# Patient Record
Sex: MALE | Race: BLACK OR AFRICAN AMERICAN | NOT HISPANIC OR LATINO
[De-identification: names, ages, dates, MRNs, and addresses within clinical notes are randomized per-mention and may not be internally consistent; named-entity substitution may affect disease eponyms.]

---

## 2018-01-12 ENCOUNTER — APPOINTMENT (OUTPATIENT)
Dept: VASCULAR SURGERY | Facility: CLINIC | Age: 83
End: 2018-01-12

## 2018-06-27 ENCOUNTER — EMERGENCY (EMERGENCY)
Facility: HOSPITAL | Age: 83
LOS: 1 days | Discharge: HOME | End: 2018-06-27
Attending: EMERGENCY MEDICINE | Admitting: EMERGENCY MEDICINE
Payer: MEDICARE

## 2018-06-27 VITALS
RESPIRATION RATE: 16 BRPM | OXYGEN SATURATION: 100 % | DIASTOLIC BLOOD PRESSURE: 94 MMHG | SYSTOLIC BLOOD PRESSURE: 171 MMHG | HEART RATE: 61 BPM | TEMPERATURE: 99 F

## 2018-06-27 VITALS
SYSTOLIC BLOOD PRESSURE: 180 MMHG | DIASTOLIC BLOOD PRESSURE: 96 MMHG | OXYGEN SATURATION: 99 % | HEART RATE: 63 BPM | RESPIRATION RATE: 18 BRPM

## 2018-06-27 DIAGNOSIS — Z95.5 PRESENCE OF CORONARY ANGIOPLASTY IMPLANT AND GRAFT: Chronic | ICD-10-CM

## 2018-06-27 DIAGNOSIS — I65.21 OCCLUSION AND STENOSIS OF RIGHT CAROTID ARTERY: Chronic | ICD-10-CM

## 2018-06-27 DIAGNOSIS — Z95.1 PRESENCE OF AORTOCORONARY BYPASS GRAFT: Chronic | ICD-10-CM

## 2018-06-27 LAB — TROPONIN T, HIGH SENSITIVITY: 14 NG/L — SIGNIFICANT CHANGE UP (ref ?–14)

## 2018-06-27 PROCEDURE — 71046 X-RAY EXAM CHEST 2 VIEWS: CPT | Mod: 26

## 2018-06-27 PROCEDURE — 99285 EMERGENCY DEPT VISIT HI MDM: CPT | Mod: GC

## 2018-06-27 NOTE — ED ADULT NURSE NOTE - OBJECTIVE STATEMENT
Received pt. in room 17 alert and oriented x 4, complaining of chest pain. Patient stated " I have chest pain that started couple days ago". No c/o dizzyness, no nausea or vomiting. Placed on cardiac monitor , sinus rhythn labs sent will continue to monitor.

## 2018-06-27 NOTE — ED PROVIDER NOTE - PMH
BPH (benign prostatic hyperplasia)    CAD (coronary artery disease)    Essential hypertension    HLD (hyperlipidemia)

## 2018-06-27 NOTE — ED PROVIDER NOTE - PROGRESS NOTE DETAILS
Pt refused to stay for further management, has decided to sign out AMA Stanislaw: The pt is clinically sober, A&Ox3, free from distracting injury.  Throughout our interactions in the ED today, the pt has demonstrated concrete thinking/reasoning, has maintained an orderly/reasonable conversation, appears to have intact insight/judgment/reason and therefore in our opinion has capacity to make decisions.  Given the pt’s presentation, we communicated our concern for ischemic heart disease.    The pt verbalized an understanding of our worries. We’ve told the patient that the ED evaluation is incomplete & many troublesome conditions haven’t  been r/o.  We have discussed the need for further ED w/u so we can get more information about the chest pain.  We have discussed the range of possible dx, potential testing & tx options.  We’ve made  numerous efforts to prevent the pt from leaving AMA.  Our discussions included the potential outcomes of leaving AMA, including worsening of their condition, becoming permanently disabled/in pain/critically ill, or death.  Despite these efforts, we were unable to convince the pt to stay.  The pt is refusing any  further care and is leaving against medical advice. We have attempted to offer tx/rx/guidance for any dangerous conditions which are most likely and/or dangerous.  We have answered all questions and have implored the pt to return ASAP to complete the w/u. Pt agreed to follow up with his cardiologist as an outpatient ASAP.

## 2018-06-27 NOTE — ED PROVIDER NOTE - MEDICAL DECISION MAKING DETAILS
83M presenting with chest pain. patient currently asymptomatic. arm tingling occurs intermittently since old neck injury. low suspicion for ACS or pna. plan for cbc, cmp, tropx2, ekg. will reassess.

## 2018-06-27 NOTE — ED PROVIDER NOTE - PHYSICAL EXAMINATION
General: well appearing male, no acute distress   HEENT: normocephalic, atraumatic   Respiratory: normal work of breathing, lungs clear to auscultation bilaterally   Cardiac: regular rate and rhythm   Abdomen: soft, non-tender   MSK: no pain or swelling in lower extremities   Skin: no rash or lacerations   Neuro: A&Ox3

## 2018-06-27 NOTE — ED ADULT TRIAGE NOTE - CHIEF COMPLAINT QUOTE
CP with numbness and tingling to left arm and hand for a few days, saw PMD and scheduled an ECHO     *****

## 2018-06-27 NOTE — ED PROVIDER NOTE - OBJECTIVE STATEMENT
83M, PMH of HTN, CAD presenting with chest pain. Patient reports pain started yesterday, is intermittent, worse with exertion, associated with tingling down left arm. No current chest pain, no shortness of breath, dizziness, abdominal pain, nausea, vomiting. Patient reports increased activity yesterday as he was preparing for his anniversary dinner today. Patient has stress test scheduled for this Friday. 83M, PMH of HTN, CAD presenting with chest pain. Patient reports pain started yesterday, is intermittent, worse with exertion, associated with tingling down left arm. No current chest pain, no shortness of breath, dizziness, abdominal pain, nausea, vomiting. Patient reports increased activity yesterday as he was preparing for his anniversary dinner today. Patient has stress test scheduled for this Friday.    Cardiologist: Sivakumar Brooks ([facility])

## 2018-06-27 NOTE — ED PROVIDER NOTE - ATTENDING CONTRIBUTION TO CARE
Stanislaw: 84 yo male with a h/o HTN, CAD c/o 2 days of chest pain radiating down his left arm that began yesterday. the pain is intermittent and worse with exertion. No associated SOB, abdominal pain, nausea, vomiting or diaphoresis. No associated new/worsening LE pain or edema. Pt has chronic left knee pain when walking. No cough, fevers or chills. Exam: GENERAL: well appearing, NAD, HEENT: MMM,  CARDIO: +S1/S2, no murmurs, rubs or gallops, LUNGS: CTA B/L, no wheezing, rales or rhonchi, ABD: soft, nontender, BSx4 quadrants, no guarding or rigidity. EXT: No LE edema or calf TTP, 2+ distal pulses x 4 extremities. NEURO: AxOx3, SKIN: no rashes or lesions, well perfused A/P- 84 yo male with a h/o CAD and chest pain. Pt's cardiologist recommended an outpatient stress test this Friday but pt had worse symptoms so he came to the ED. will obtain cbc, cmp, troponin, CXR, and discuss with pt cardiologist.

## 2018-09-21 ENCOUNTER — INPATIENT (INPATIENT)
Facility: HOSPITAL | Age: 83
LOS: 2 days | Discharge: HOME | End: 2018-09-24
Attending: INTERNAL MEDICINE | Admitting: INTERNAL MEDICINE
Payer: MEDICARE

## 2018-09-21 VITALS
RESPIRATION RATE: 18 BRPM | HEART RATE: 66 BPM | TEMPERATURE: 98 F | DIASTOLIC BLOOD PRESSURE: 107 MMHG | SYSTOLIC BLOOD PRESSURE: 152 MMHG | OXYGEN SATURATION: 96 %

## 2018-09-21 DIAGNOSIS — T73.3XXA EXHAUSTION DUE TO EXCESSIVE EXERTION, INITIAL ENCOUNTER: ICD-10-CM

## 2018-09-21 DIAGNOSIS — Z95.1 PRESENCE OF AORTOCORONARY BYPASS GRAFT: Chronic | ICD-10-CM

## 2018-09-21 DIAGNOSIS — I65.21 OCCLUSION AND STENOSIS OF RIGHT CAROTID ARTERY: Chronic | ICD-10-CM

## 2018-09-21 DIAGNOSIS — Z95.5 PRESENCE OF CORONARY ANGIOPLASTY IMPLANT AND GRAFT: Chronic | ICD-10-CM

## 2018-09-21 LAB
ALBUMIN SERPL ELPH-MCNC: 3.7 G/DL — SIGNIFICANT CHANGE UP (ref 3.3–5)
ALP SERPL-CCNC: 70 U/L — SIGNIFICANT CHANGE UP (ref 40–120)
ALT FLD-CCNC: 13 U/L — SIGNIFICANT CHANGE UP (ref 4–41)
APPEARANCE UR: CLEAR — SIGNIFICANT CHANGE UP
APTT BLD: 33.2 SEC — SIGNIFICANT CHANGE UP (ref 27.5–37.4)
AST SERPL-CCNC: 19 U/L — SIGNIFICANT CHANGE UP (ref 4–40)
BASOPHILS # BLD AUTO: 0.03 K/UL — SIGNIFICANT CHANGE UP (ref 0–0.2)
BASOPHILS NFR BLD AUTO: 0.4 % — SIGNIFICANT CHANGE UP (ref 0–2)
BILIRUB SERPL-MCNC: 0.3 MG/DL — SIGNIFICANT CHANGE UP (ref 0.2–1.2)
BILIRUB UR-MCNC: NEGATIVE — SIGNIFICANT CHANGE UP
BLOOD UR QL VISUAL: NEGATIVE — SIGNIFICANT CHANGE UP
BUN SERPL-MCNC: 16 MG/DL — SIGNIFICANT CHANGE UP (ref 7–23)
CALCIUM SERPL-MCNC: 9.5 MG/DL — SIGNIFICANT CHANGE UP (ref 8.4–10.5)
CHLORIDE SERPL-SCNC: 102 MMOL/L — SIGNIFICANT CHANGE UP (ref 98–107)
CO2 SERPL-SCNC: 22 MMOL/L — SIGNIFICANT CHANGE UP (ref 22–31)
COLOR SPEC: COLORLESS — SIGNIFICANT CHANGE UP
CREAT SERPL-MCNC: 1.01 MG/DL — SIGNIFICANT CHANGE UP (ref 0.5–1.3)
EOSINOPHIL # BLD AUTO: 0.14 K/UL — SIGNIFICANT CHANGE UP (ref 0–0.5)
EOSINOPHIL NFR BLD AUTO: 1.9 % — SIGNIFICANT CHANGE UP (ref 0–6)
GLUCOSE SERPL-MCNC: 78 MG/DL — SIGNIFICANT CHANGE UP (ref 70–99)
GLUCOSE UR-MCNC: NEGATIVE — SIGNIFICANT CHANGE UP
HCT VFR BLD CALC: 40.2 % — SIGNIFICANT CHANGE UP (ref 39–50)
HGB BLD-MCNC: 12 G/DL — LOW (ref 13–17)
IMM GRANULOCYTES # BLD AUTO: 0.02 # — SIGNIFICANT CHANGE UP
IMM GRANULOCYTES NFR BLD AUTO: 0.3 % — SIGNIFICANT CHANGE UP (ref 0–1.5)
INR BLD: 1.04 — SIGNIFICANT CHANGE UP (ref 0.88–1.17)
KETONES UR-MCNC: NEGATIVE — SIGNIFICANT CHANGE UP
LEUKOCYTE ESTERASE UR-ACNC: NEGATIVE — SIGNIFICANT CHANGE UP
LYMPHOCYTES # BLD AUTO: 1.7 K/UL — SIGNIFICANT CHANGE UP (ref 1–3.3)
LYMPHOCYTES # BLD AUTO: 22.5 % — SIGNIFICANT CHANGE UP (ref 13–44)
MCHC RBC-ENTMCNC: 26.8 PG — LOW (ref 27–34)
MCHC RBC-ENTMCNC: 29.9 % — LOW (ref 32–36)
MCV RBC AUTO: 89.7 FL — SIGNIFICANT CHANGE UP (ref 80–100)
MONOCYTES # BLD AUTO: 1.09 K/UL — HIGH (ref 0–0.9)
MONOCYTES NFR BLD AUTO: 14.4 % — HIGH (ref 2–14)
NEUTROPHILS # BLD AUTO: 4.58 K/UL — SIGNIFICANT CHANGE UP (ref 1.8–7.4)
NEUTROPHILS NFR BLD AUTO: 60.5 % — SIGNIFICANT CHANGE UP (ref 43–77)
NITRITE UR-MCNC: NEGATIVE — SIGNIFICANT CHANGE UP
NRBC # FLD: 0 — SIGNIFICANT CHANGE UP
PH UR: 6.5 — SIGNIFICANT CHANGE UP (ref 5–8)
PLATELET # BLD AUTO: 285 K/UL — SIGNIFICANT CHANGE UP (ref 150–400)
PMV BLD: 10 FL — SIGNIFICANT CHANGE UP (ref 7–13)
POTASSIUM SERPL-MCNC: 4.7 MMOL/L — SIGNIFICANT CHANGE UP (ref 3.5–5.3)
POTASSIUM SERPL-SCNC: 4.7 MMOL/L — SIGNIFICANT CHANGE UP (ref 3.5–5.3)
PROT SERPL-MCNC: 8.2 G/DL — SIGNIFICANT CHANGE UP (ref 6–8.3)
PROT UR-MCNC: NEGATIVE — SIGNIFICANT CHANGE UP
PROTHROM AB SERPL-ACNC: 11.6 SEC — SIGNIFICANT CHANGE UP (ref 9.8–13.1)
RBC # BLD: 4.48 M/UL — SIGNIFICANT CHANGE UP (ref 4.2–5.8)
RBC # FLD: 13.1 % — SIGNIFICANT CHANGE UP (ref 10.3–14.5)
SODIUM SERPL-SCNC: 138 MMOL/L — SIGNIFICANT CHANGE UP (ref 135–145)
SP GR SPEC: 1.01 — SIGNIFICANT CHANGE UP (ref 1–1.04)
TROPONIN T, HIGH SENSITIVITY: 15 NG/L — SIGNIFICANT CHANGE UP (ref ?–14)
UROBILINOGEN FLD QL: NORMAL — SIGNIFICANT CHANGE UP
WBC # BLD: 7.56 K/UL — SIGNIFICANT CHANGE UP (ref 3.8–10.5)
WBC # FLD AUTO: 7.56 K/UL — SIGNIFICANT CHANGE UP (ref 3.8–10.5)

## 2018-09-21 PROCEDURE — 99291 CRITICAL CARE FIRST HOUR: CPT

## 2018-09-21 PROCEDURE — 71045 X-RAY EXAM CHEST 1 VIEW: CPT | Mod: 26

## 2018-09-21 RX ORDER — DEXAMETHASONE 0.5 MG/5ML
10 ELIXIR ORAL ONCE
Qty: 0 | Refills: 0 | Status: COMPLETED | OUTPATIENT
Start: 2018-09-21 | End: 2018-09-21

## 2018-09-21 RX ORDER — METOPROLOL TARTRATE 50 MG
1 TABLET ORAL
Qty: 0 | Refills: 0 | COMMUNITY

## 2018-09-21 RX ORDER — DIPHENHYDRAMINE HCL 50 MG
50 CAPSULE ORAL EVERY 6 HOURS
Qty: 0 | Refills: 0 | Status: DISCONTINUED | OUTPATIENT
Start: 2018-09-21 | End: 2018-09-21

## 2018-09-21 RX ORDER — DEXAMETHASONE 0.5 MG/5ML
10 ELIXIR ORAL EVERY 6 HOURS
Qty: 0 | Refills: 0 | Status: DISCONTINUED | OUTPATIENT
Start: 2018-09-22 | End: 2018-09-23

## 2018-09-21 RX ORDER — DIPHENHYDRAMINE HCL 50 MG
50 CAPSULE ORAL EVERY 8 HOURS
Qty: 0 | Refills: 0 | Status: DISCONTINUED | OUTPATIENT
Start: 2018-09-21 | End: 2018-09-23

## 2018-09-21 RX ORDER — FAMOTIDINE 10 MG/ML
20 INJECTION INTRAVENOUS
Qty: 0 | Refills: 0 | Status: DISCONTINUED | OUTPATIENT
Start: 2018-09-21 | End: 2018-09-23

## 2018-09-21 RX ORDER — HEPARIN SODIUM 5000 [USP'U]/ML
5000 INJECTION INTRAVENOUS; SUBCUTANEOUS EVERY 8 HOURS
Qty: 0 | Refills: 0 | Status: DISCONTINUED | OUTPATIENT
Start: 2018-09-21 | End: 2018-09-24

## 2018-09-21 RX ORDER — DIPHENHYDRAMINE HCL 50 MG
50 CAPSULE ORAL ONCE
Qty: 0 | Refills: 0 | Status: COMPLETED | OUTPATIENT
Start: 2018-09-21 | End: 2018-09-21

## 2018-09-21 RX ORDER — FAMOTIDINE 10 MG/ML
20 INJECTION INTRAVENOUS ONCE
Qty: 0 | Refills: 0 | Status: COMPLETED | OUTPATIENT
Start: 2018-09-21 | End: 2018-09-21

## 2018-09-21 RX ORDER — INFLUENZA VIRUS VACCINE 15; 15; 15; 15 UG/.5ML; UG/.5ML; UG/.5ML; UG/.5ML
0.5 SUSPENSION INTRAMUSCULAR ONCE
Qty: 0 | Refills: 0 | Status: COMPLETED | OUTPATIENT
Start: 2018-09-21 | End: 2018-09-21

## 2018-09-21 RX ADMIN — Medication 102 MILLIGRAM(S): at 23:49

## 2018-09-21 RX ADMIN — Medication 125 MILLIGRAM(S): at 19:30

## 2018-09-21 RX ADMIN — Medication 50 MILLIGRAM(S): at 18:05

## 2018-09-21 RX ADMIN — Medication 102 MILLIGRAM(S): at 18:14

## 2018-09-21 RX ADMIN — HEPARIN SODIUM 5000 UNIT(S): 5000 INJECTION INTRAVENOUS; SUBCUTANEOUS at 22:36

## 2018-09-21 RX ADMIN — Medication 10 MILLIGRAM(S): at 18:44

## 2018-09-21 RX ADMIN — FAMOTIDINE 20 MILLIGRAM(S): 10 INJECTION INTRAVENOUS at 18:00

## 2018-09-21 NOTE — PATIENT PROFILE ADULT. - VISION (WITH CORRECTIVE LENSES IF THE PATIENT USUALLY WEARS THEM):
with glassess/Normal vision: sees adequately in most situations; can see medication labels, newsprint

## 2018-09-21 NOTE — ED PROVIDER NOTE - PHYSICAL EXAMINATION
Ivania Machado M.D.:   patient awake alert seen lying on stretcher in no respiratory distress .   LUNGS CTAB no wheeze no crackle.   CARD RRR no m/r/g.    Abdomen soft NT ND suprapubic fullness no rebound no guarding no CVA tenderness.   EXT WWP no edema no calf tenderness CV 2+DP/PT bilaterally.   neuro A&Ox3 gait normal.    skin warm and dry no rash  HEENT: moist mucous membranes, PERRL, EOMI significant upper lip swelling without any oral involvement.

## 2018-09-21 NOTE — ED ADULT NURSE NOTE - OBJECTIVE STATEMENT
Received pt into spot #3 accompanied by son. Pt A/o x3 calm cooperative, denies any pain or SOB at present. Pt c/o intermittent mid sternal stabbing chest pain lasting seconds at a time since this morning along with upper lip swelling and increased urinary frequency. Denies fever chills or dysuria. Denies any chest pain now. Denies any SOB, dizziness or nausea. Placed pt on tele monitor SR HR 60s. Angioedema noted to upper lip. Pt takes Ramipril and have been taking for years. Speech does not sound hoarse. Pt eval by resident and Dr. Holland. Decadron, pepcid and Benadryl given as ordered. ENT came to eval pt. Will continue to closely monitor.

## 2018-09-21 NOTE — ED PROVIDER NOTE - MEDICAL DECISION MAKING DETAILS
Skyler BEACH: 82 yo M with hyperlipidemia, HTN, CAD here for angioedema of upper lip. Seen by ENT and no lower airway involvement, thought to be type 1 angioedema. Patient observed in ED, rapid progression of upper lip swelling to lower lip and right face. No voice change or SOB. Patient evaluated by MICU and accepted for admission.

## 2018-09-21 NOTE — ED PROVIDER NOTE - PSH
S/P CABG (coronary artery bypass graft)    S/P drug eluting coronary stent placement    Stenosis of right carotid artery  s/p endartectomy

## 2018-09-21 NOTE — H&P ADULT - NSHPLABSRESULTS_GEN_ALL_CORE
Labs and imaging personally reviewed                            12.0   7.56  )-----------( 285      ( 21 Sep 2018 17:40 )             40.2     -    138  |  102  |  16  ----------------------------<  78  4.7   |  22  |  1.01    Ca    9.5      21 Sep 2018 17:40    TPro  8.2  /  Alb  3.7  /  TBili  0.3  /  DBili  x   /  AST  19  /  ALT  13  /  AlkPhos  70  -          LIVER FUNCTIONS - ( 21 Sep 2018 17:40 )  Alb: 3.7 g/dL / Pro: 8.2 g/dL / ALK PHOS: 70 u/L / ALT: 13 u/L / AST: 19 u/L / GGT: x           Urinalysis Basic - ( 21 Sep 2018 17:40 )    Color: COLORLESS / Appearance: CLEAR / S.007 / pH: 6.5  Gluc: NEGATIVE / Ketone: NEGATIVE  / Bili: NEGATIVE / Urobili: NORMAL   Blood: NEGATIVE / Protein: NEGATIVE / Nitrite: NEGATIVE   Leuk Esterase: NEGATIVE / RBC: x / WBC x   Sq Epi: x / Non Sq Epi: x / Bacteria: x      PT/INR - ( 21 Sep 2018 17:40 )   PT: 11.6 SEC;   INR: 1.04          PTT - ( 21 Sep 2018 17:40 )  PTT:33.2 SEC    EKG: NSR, bifascicular block, chronic    CXR: Clear lungs

## 2018-09-21 NOTE — H&P ADULT - PMH
BPH (benign prostatic hyperplasia)    CAD (coronary artery disease)    Essential hypertension    HLD (hyperlipidemia)
no

## 2018-09-21 NOTE — PROGRESS NOTE ADULT - SUBJECTIVE AND OBJECTIVE BOX
ENT FOLLOW UP NOTE    Pt seen and examined. Swelling mildly increased in ED but has stabilized, no further progression.   Now in MICU for close monitoring  Denies throat closing sensation, no trouble swallowing/voice changes    AVSS  NAD, asleep   Breathing comfortably on RA, no stridor/stertor  Upper lip swelling slightly worse, extension now to right corner of mouth and also mildly to the right lower lip as well  Patient speaking full sentences  Tongue without swelling, oral cavity and OP clear without swelling     A/P: type I angioedema, mild progression but now stabilized  -no change to previous recommendations  -discussed with MICU and order now changed to IV benadryl q8hr instead   -continue to monitor for improvement

## 2018-09-21 NOTE — ED ADULT NURSE NOTE - HOW OFTEN DO YOU HAVE A DRINK CONTAINING ALCOHOL?
Left message regarding below and to inform patient this form will be in dispensary for  today.   Never

## 2018-09-21 NOTE — ED PROVIDER NOTE - ATTENDING CONTRIBUTION TO CARE
Patient is a 84 yo M with history of CAD, HTN, hyperlipidemia, BPH here for upper lip swelling since 12 pm today. Patient is on Ramipril. No prior episodes. No tongue swelling, stridor. Denies sob, headache. Patient also c/o chest pain, sharp intermittent in his right and left chest. Denies any chest pain now. No abdominal pain. Reports discomfort when urinating. No burning.     VS noted  Gen. no acute distress, Non toxic   HEENT: EOMI, mmm,   Lungs: CTAB/L no C/ W /R   CVS: RRR   Abd; Soft non tender, non distended   Ext: no edema  Skin: no rash  Neuro AAOx3 non focal clear speech  a/p: angioedema of upper lip -  no voice change, sob, stridor. Will treat with steroids. ENT consulted. Will also work up patient's chest pain and urinary complaints.   - Skyler BEACH Patient is a 82 yo M with history of CAD, HTN, hyperlipidemia, BPH here for upper lip swelling since 12 pm today. Patient is on Ramipril. No prior episodes. No tongue swelling, stridor. Denies sob, headache. Patient also c/o chest pain, sharp intermittent in his right and left chest. Denies any chest pain now. No abdominal pain. Reports discomfort when urinating. No burning. Patient took 2 ramiprils today, at 10 AM and 12 PM (Because he thought it would help his swelling).     VS noted  Gen. no acute distress, Non toxic   HEENT: EOMI, mmm,   Lungs: CTAB/L no C/ W /R   CVS: RRR   Abd; Soft non tender, non distended   Ext: no edema  Skin: no rash  Neuro AAOx3 non focal clear speech  a/p: angioedema of upper lip -  no voice change, sob, stridor. Will treat with steroids. ENT consulted. Will also work up patient's chest pain and urinary complaints.   - Skyler BEACH

## 2018-09-21 NOTE — ED ADULT NURSE NOTE - ED STAT RN HANDOFF DETAILS
Report received from RN Juan Pablo Morales. Pt. received with angioedema noted. No respiratory distress at this time. Pt. is admitted to ICU, report given. Pt. will be transported with ED tech and RN at bedside. Report received from RN Juan Pablo Morales. Pt. received with angioedema noted, carter in place. No respiratory distress at this time. Pt. is admitted to ICU, report given. Pt. will be transported with ED tech and RN at bedside.

## 2018-09-21 NOTE — H&P ADULT - HISTORY OF PRESENT ILLNESS
83 M w/ BPH, CAD s/p PCI and CABG, HTN p/w lip swelling since yesterday. Pt reports that it initially started on his right upper lip but progressed to his entire upper lip and now progressing to his lower lip. Denies any new medications, detergents/lotions/cosmetic products, or new food exposure. Endorsed some chest pain earlier but reports now resolved. Denies any SOB, cough, or dysphagia. No fevers/chills, abd pain, nausea, vomiting, constipation, or diarrhea. Endorses urinary frequency but denies any dysuria or hematuria. Pt reports being on ramipril for CAD and took 2 doses today because he "did not feel well". He was seen by ENT in the ED w/ bedside scope revealing no airway compromise. MICU consulted due to progressively worsening angioedema and patient admitted to MICU for airway monitoring.    At baseline, pt functional, independent in ADLs, and a/ox3    Vital Signs Last 24 Hrs  T(C): 36.6 (21 Sep 2018 16:26), Max: 36.6 (21 Sep 2018 16:26)  T(F): 97.8 (21 Sep 2018 16:26), Max: 97.8 (21 Sep 2018 16:26)  HR: 74 (21 Sep 2018 19:36) (63 - 74)  BP: 166/122 (21 Sep 2018 19:36) (152/107 - 186/110)  RR: 16 (21 Sep 2018 19:36) (16 - 18)  SpO2: 100% (21 Sep 2018 19:36) (96% - 100%)

## 2018-09-21 NOTE — CONSULT NOTE ADULT - ASSESSMENT
A/P: type I angioedema 2/2 ACE inhibitor use  -ACE inhibitors now marked as allergy, patient instructed to follow up with PCP to change medications  -chest pain workup per ED  -IV decadron 10mg q6hr   -IV benadryl 50mg q8hr  -IV pepcid 20mg q12hrs  -monitor for symptomatic improvement, keep on continuous pulse ox  -keep NPO until lip swelling improves   -if patient develops respiratory distress or difficulty controlling secretions, page ENT and Anesthesia STAT  -will need to keep vs admit until lip swelling resolves  -no need for repeat FOE unless having acute respiratory symptoms - SOB, stridor, sensation of throat closing  -discussed with ED and d/w attending A/P: 82yo M with type I angioedema aka anterior isolated LIP edema only, most likely culprit is 2/2 ACE inhibitor use. FOE reveals no airway edema.   -ACE inhibitors now marked as allergy, patient instructed to follow up with PCP to change medications  -chest pain workup per ED  -IV decadron 10mg q6hr   -IV benadryl 50mg q8hr  -IV pepcid 20mg q12hrs  -monitor for symptomatic improvement, keep on continuous pulse ox  -keep NPO until lip swelling improves   -if patient develops respiratory distress or difficulty controlling secretions, page ENT and Anesthesia STAT  -will need to keep vs admit to medicine until lip swelling resolves  -strict glucose control   -no need for repeat FOE unless having acute respiratory symptoms - SOB, stridor, sensation of throat closing  -discussed with ED and d/w attending

## 2018-09-21 NOTE — H&P ADULT - NSHPPHYSICALEXAM_GEN_ALL_CORE
Vital Signs Last 24 Hrs  T(C): 36.6 (21 Sep 2018 16:26), Max: 36.6 (21 Sep 2018 16:26)  T(F): 97.8 (21 Sep 2018 16:26), Max: 97.8 (21 Sep 2018 16:26)  HR: 74 (21 Sep 2018 19:36) (63 - 74)  BP: 166/122 (21 Sep 2018 19:36) (152/107 - 186/110)  RR: 16 (21 Sep 2018 19:36) (16 - 18)  SpO2: 100% (21 Sep 2018 19:36) (96% - 100%)    GENERAL APPEARANCE: NAD, drowsy  HEENT:  NC/AT, clear conjunctiva, +angioedema of upper lip w/ involvement of R lower lip.   NECK: Neck supple, non-tender without lymphadenopathy, masses. No stridor  CARDIAC: Normal S1 and S2. No S3, S4 or murmurs. Rhythm is regular.  LUNGS: Clear to auscultation and percussion without rales, rhonchi, wheezing or diminished breath sounds.  ABDOMEN: Soft, nondistended, nontender. No guarding or rebound.   MUSKULOSKELETAL: No joint erythema or tenderness.   EXTREMITIES: No edema.  NEUROLOGICAL: No focal neurologic deficit.   SKIN: Skin clean, dry, intact  PSYCHIATRIC: AOx2.

## 2018-09-21 NOTE — ED ADULT TRIAGE NOTE - CHIEF COMPLAINT QUOTE
states" I have chest pain started since this morning and swelling to my lips since noon today". h/o stents, bypass, CAD, HTN

## 2018-09-21 NOTE — H&P ADULT - NSHPREVIEWOFSYSTEMS_GEN_ALL_CORE
CONSTITUTIONAL:  No fever, chills, weakness or fatigue.  HEENT:  No rhinorrhea, +lip swelling  SKIN:  No rash or itching.  CARDIOVASCULAR:  No chest pain, chest pressure or chest discomfort. No palpitations or edema.  RESPIRATORY:  No shortness of breath, cough or sputum.  GASTROINTESTINAL:  No nausea, vomiting or diarrhea. No abdominal pain.   GENITOURINARY:  Denies hematuria, dysuria.   NEUROLOGICAL:  No headache, dizziness, syncope.   MUSCULOSKELETAL:  No muscle, back pain, joint pain or stiffness.  HEMATOLOGIC:  No bleeding or bruising.

## 2018-09-21 NOTE — H&P ADULT - ATTENDING COMMENTS
84 yo man with angioedema involving face and upper lip.  Sparing the tongue and airway.  Received steroids and H1 H2 blockers, scoped by ENT, some progression of swelling while in ED so admitted to MICU for close monitoring for airway compromise/progression of angioedema which is likely 2/2 ACE inhibitor  - d/c ACE inhibitor  - continue decadron, famotidine and benedryl  - monitor for airway patency, O2 sat, progression of angioedema  - low threshold to intubate if there is any swelling of tongue/airway  - no indication for FFP at this time; progression seems to have slowed/stopped  guarded

## 2018-09-21 NOTE — ED PROVIDER NOTE - OBJECTIVE STATEMENT
Ivania Machado M.D: 83M hx BPH CAD, HTN HLD p/w lip swelling since 12pm. started on right side of lip and has since progressed to involve entire upper lip. nevr happened before. no difficulty speaking, swallowing, breathing. also notes intermittent left and right sided cp without associated symptoms. no known allergies. is on ramipril.

## 2018-09-21 NOTE — ED PROCEDURE NOTE - PROCEDURE ADDITIONAL DETAILS
Focused ED ultrasound of the bladder for volume measurement [id number]    Indication: suspected urinary retention  Findings:  Bladder volume: 397 ml  Impression: Urinary retention    Procedure note and images placed in patient's chart

## 2018-09-21 NOTE — H&P ADULT - ASSESSMENT
83 M w/ BPH, CAD s/p PCI and CABG, HTN a/w angioedema suspected 2/2 to ACEi. Pt admitted to MICU for airway monitoring.    #Neuro  -Mildly confused, possibly 2/2 to benadryl/steroids in elderly pt. Continue to monitor.     #Resp  -No respiratory distress, airway protected at this time, continue to monitor    #CVS  -Hemodynamically stable, continue to monitor  -Chest pain resolved. EKG w/ no changes, troponin negative  -Hold ramipril for now, consider ARB in the future    #GI  -NPO for now in the event patient needs to be intubated    #Renal  -Schmidt placed due to urinary retention in ER  -TOV when patient completed tx for angioedema as benadryl is anticholinergic    #Heme  -No issues, continue to monitor    #ID  -Afebrile, no leukocytosis. Monitor off abx    #Endo  -No issues, continue to monitor    #Ethics  -Full code

## 2018-09-21 NOTE — ED PROVIDER NOTE - PROGRESS NOTE DETAILS
Skyler BEACH: ENT at bedside. Skyler BEACH: Patient scoped by ENT, no airway involvement. This is type 1 angioedema per ENT. Recommendation is for decadron, benadryl, pepcid. No ICU admission indicated for type 1 angioedema. Ivania Machado M.D: angioedema continuing to progress now on lower lip. still no signs of repisratory distress. MICU called given progression. solumedrol ordered per ENT recs. will place emma as pt in retention. Skyler BEACH: Patient evaluated by Dr. Fritz of MICU and accepted for observation. Skyler BEACH: Patient evaluated by Dr. Fritz of MICU and accepted for MICU admssion and observation secondary to rapid progression of symptoms. No respiratory compromise in ED but given rapid progression, close monitoring warranted.

## 2018-09-21 NOTE — ED ADULT NURSE NOTE - NSIMPLEMENTINTERV_GEN_ALL_ED
Implemented All Fall Risk Interventions:  [place] to call system. Call bell, personal items and telephone within reach. Instruct patient to call for assistance. Room bathroom lighting operational. Non-slip footwear when patient is off stretcher. Physically safe environment: no spills, clutter or unnecessary equipment. Stretcher in lowest position, wheels locked, appropriate side rails in place. Provide visual cue, wrist band, yellow gown, etc. Monitor gait and stability. Monitor for mental status changes and reorient to person, place, and time. Review medications for side effects contributing to fall risk. Reinforce activity limits and safety measures with patient and family.

## 2018-09-21 NOTE — CONSULT NOTE ADULT - SUBJECTIVE AND OBJECTIVE BOX
Pt seen and examined in ED earlier at ~5:45PM.     HPI:  84yo M -Amercian with PMHx HTN on ramipril for many years presents with first episode of acute onset upper lip swelling which has progressed since noon today. States never has happened before personally, no family history. No respiratory issues - denies SOB, drooling, difficulty swallowing saliva, no sensation of throat closing. Besides upper lip, denies swelling elsewhere. No tongue, face, neck swelling. Able to take PO fine but now NPO. Denies voice change.   No hypotension, rash, dizziness, GI upset, or anaphylaxis symptoms.   No medications given in ambulance, in ED ordered for angioedema cocktail (decadron, pepcid, benadryl) but not yet given, awaiting arrival of meds.          Birth History:  PAST MEDICAL & SURGICAL HISTORY:  BPH (benign prostatic hyperplasia)  HLD (hyperlipidemia)  Essential hypertension  CAD (coronary artery disease)  S/P drug eluting coronary stent placement  Stenosis of right carotid artery: s/p endartectomy  S/P CABG (coronary artery bypass graft)    FAMILY HISTORY:      MEDICATIONS  (STANDING):  methylPREDNISolone sodium succinate Injectable 125 milliGRAM(s) IV Push Once    MEDICATIONS  (PRN):    Allergies  ramipril (Angioedema)    REVIEW OF SYSTEMS:  neg except per HPI                         12.0   7.56  )-----------( 285      ( 21 Sep 2018 17:40 )             40.2           Vital Signs Last 24 Hrs  T(C): 36.6 (21 Sep 2018 16:26), Max: 36.6 (21 Sep 2018 16:26)  T(F): 97.8 (21 Sep 2018 16:26), Max: 97.8 (21 Sep 2018 16:26)  HR: 63 (21 Sep 2018 18:15) (63 - 66)  BP: 186/110 (21 Sep 2018 18:15) (152/107 - 186/110)  BP(mean): --  RR: 16 (21 Sep 2018 18:15) (16 - 18)  SpO2: 99% (21 Sep 2018 18:15) (96% - 99%)      PHYSICAL EXAM:  Constitutional Normal: well nourished, well developed, no acute distress  Breathing comfortably on RA, no stridor or stertor  EOMI, face symmetric but with significantly swollen upper lip.   External Nose:  Normal, no structural deformities  Anterior Nasal Cavity:	Normal mucosa, no turbinate hypertrophy, straight septum				  Oral Cavity:  lips large, no trismus, no drooling. Good dentition, tongue midline and not enlarged, floor of mouth soft, uvula without edema, soft palate without edema.   Neck: No palpable lymphadenopathy, no neck swelling, trachea flat and neck is skinny   Neurologic: awake and alert      Fiberoptic Laryngoscopy  Adenoids mild but non-hypertrophied  nasopharynx and soft palate clear   epiglottis sharp, vocal cords mobile bilaterally with no lesions  arytenoids and AE folds are sharp   NO EVIDENCE OF AIRWAY EDEMA

## 2018-09-22 LAB
BLD GP AB SCN SERPL QL: NEGATIVE — SIGNIFICANT CHANGE UP
BUN SERPL-MCNC: 18 MG/DL — SIGNIFICANT CHANGE UP (ref 7–23)
C3 SERPL-MCNC: 141.2 MG/DL — SIGNIFICANT CHANGE UP (ref 90–180)
C4 SERPL-MCNC: 45.4 MG/DL — HIGH (ref 10–40)
CALCIUM SERPL-MCNC: 9.8 MG/DL — SIGNIFICANT CHANGE UP (ref 8.4–10.5)
CHLORIDE SERPL-SCNC: 101 MMOL/L — SIGNIFICANT CHANGE UP (ref 98–107)
CO2 SERPL-SCNC: 26 MMOL/L — SIGNIFICANT CHANGE UP (ref 22–31)
CREAT SERPL-MCNC: 1.03 MG/DL — SIGNIFICANT CHANGE UP (ref 0.5–1.3)
GLUCOSE SERPL-MCNC: 115 MG/DL — HIGH (ref 70–99)
HCT VFR BLD CALC: 47.1 % — SIGNIFICANT CHANGE UP (ref 39–50)
HGB BLD-MCNC: 14 G/DL — SIGNIFICANT CHANGE UP (ref 13–17)
MAGNESIUM SERPL-MCNC: 2.1 MG/DL — SIGNIFICANT CHANGE UP (ref 1.6–2.6)
MCHC RBC-ENTMCNC: 26.1 PG — LOW (ref 27–34)
MCHC RBC-ENTMCNC: 29.7 % — LOW (ref 32–36)
MCV RBC AUTO: 87.9 FL — SIGNIFICANT CHANGE UP (ref 80–100)
NRBC # FLD: 0 — SIGNIFICANT CHANGE UP
PHOSPHATE SERPL-MCNC: 3.8 MG/DL — SIGNIFICANT CHANGE UP (ref 2.5–4.5)
PLATELET # BLD AUTO: 346 K/UL — SIGNIFICANT CHANGE UP (ref 150–400)
PMV BLD: 10 FL — SIGNIFICANT CHANGE UP (ref 7–13)
POTASSIUM SERPL-MCNC: 4.2 MMOL/L — SIGNIFICANT CHANGE UP (ref 3.5–5.3)
POTASSIUM SERPL-SCNC: 4.2 MMOL/L — SIGNIFICANT CHANGE UP (ref 3.5–5.3)
RBC # BLD: 5.36 M/UL — SIGNIFICANT CHANGE UP (ref 4.2–5.8)
RBC # FLD: 13.2 % — SIGNIFICANT CHANGE UP (ref 10.3–14.5)
RH IG SCN BLD-IMP: POSITIVE — SIGNIFICANT CHANGE UP
SODIUM SERPL-SCNC: 140 MMOL/L — SIGNIFICANT CHANGE UP (ref 135–145)
WBC # BLD: 7.33 K/UL — SIGNIFICANT CHANGE UP (ref 3.8–10.5)
WBC # FLD AUTO: 7.33 K/UL — SIGNIFICANT CHANGE UP (ref 3.8–10.5)

## 2018-09-22 PROCEDURE — 99291 CRITICAL CARE FIRST HOUR: CPT

## 2018-09-22 RX ORDER — SODIUM CHLORIDE 9 MG/ML
1000 INJECTION, SOLUTION INTRAVENOUS
Qty: 0 | Refills: 0 | Status: DISCONTINUED | OUTPATIENT
Start: 2018-09-22 | End: 2018-09-23

## 2018-09-22 RX ADMIN — Medication 50 MILLIGRAM(S): at 10:30

## 2018-09-22 RX ADMIN — Medication 50 MILLIGRAM(S): at 01:37

## 2018-09-22 RX ADMIN — HEPARIN SODIUM 5000 UNIT(S): 5000 INJECTION INTRAVENOUS; SUBCUTANEOUS at 13:20

## 2018-09-22 RX ADMIN — SODIUM CHLORIDE 75 MILLILITER(S): 9 INJECTION, SOLUTION INTRAVENOUS at 20:25

## 2018-09-22 RX ADMIN — FAMOTIDINE 20 MILLIGRAM(S): 10 INJECTION INTRAVENOUS at 17:45

## 2018-09-22 RX ADMIN — HEPARIN SODIUM 5000 UNIT(S): 5000 INJECTION INTRAVENOUS; SUBCUTANEOUS at 22:40

## 2018-09-22 RX ADMIN — HEPARIN SODIUM 5000 UNIT(S): 5000 INJECTION INTRAVENOUS; SUBCUTANEOUS at 06:17

## 2018-09-22 RX ADMIN — Medication 102 MILLIGRAM(S): at 17:45

## 2018-09-22 RX ADMIN — Medication 102 MILLIGRAM(S): at 13:19

## 2018-09-22 RX ADMIN — FAMOTIDINE 20 MILLIGRAM(S): 10 INJECTION INTRAVENOUS at 06:16

## 2018-09-22 RX ADMIN — Medication 102 MILLIGRAM(S): at 06:52

## 2018-09-22 RX ADMIN — Medication 50 MILLIGRAM(S): at 17:45

## 2018-09-22 NOTE — PROGRESS NOTE ADULT - ATTENDING COMMENTS
83 M w/ BPH, CAD s/p PCI and CABG, HTN a/w angioedema suspected 2/2 to ACEi. Pt admitted to MICU for airway monitoring. Upper lip swelling worse this morning compared to last night while on corticosteroids, pepcid, benadryl. FFP ordered for refractory angioedema.

## 2018-09-22 NOTE — PROGRESS NOTE ADULT - ASSESSMENT
83 M w/ BPH, CAD s/p PCI and CABG, HTN a/w angioedema suspected 2/2 to ACEi. Pt admitted to MICU for airway monitoring.    #Neuro  -AxO to self, place and time this AM. moving all extremities. low concern    #Resp  -No respiratory distress, airway protected at this time, continue to monitor  -if patient develops respiratory distress or difficulty controlling secretions, page ENT and Anesthesia STAT, can intubate orally given no tongue, oral cavity, or OP involvement  -ENT found no swelling within oropharynx or trachea, all swelling is anterior  --monitor for symptomatic improvement, keep on continuous pulse ox  --no need for repeat FOE as per ENT    #CVS  -Hemodynamically stable, continue to monitor  -Chest pain resolved. EKG w/ no changes, troponin negative  -Hold ramipril for now, consider ARB in the future    #GI  -NPO until swelling improves as per ENT  -Will give D5 maintenance in context of not eating    #Renal  -Schmidt placed due to urinary retention in ER  -TOV when patient completed tx for angioedema as benadryl is anticholinergic    #Heme  -No issues, continue to monitor  -T&S and then one unit of FFP as this is possibly bradykinin mediated  -will check C1 esterase inhibitor     #ID  -Afebrile, no leukocytosis. Monitor off abx    #Endo  -No issues, continue to monitor    #DVT prophylaxis  -SubQ heparin    #Ethics  -Full code

## 2018-09-22 NOTE — PROGRESS NOTE ADULT - SUBJECTIVE AND OBJECTIVE BOX
Patient seen and examined at bedside.    Feels like the swelling has improved. Continues to deny respiratory symptoms, throat closing sensation, trouble swallowing or voice changes.    AVSS  NAD, awake and alert this AM   Breathing comfortably on RA, no stridor/stertor  Remaining upper and lower lip swelling however slightly improved from prior  OC/OP: tongue, FOM, soft palate, uvula wnl, posterior OP clear    A/P  83M admitted for management of type I angioedema, initially with mild progression but now improved.  -IV decadron 10mg q6hr until symptoms have resolved  -IV benadryl 50mg q8hr until symptoms have resolved  -IV pepcid 20mg q12hrs until 1 week after resolution of symptoms  -may advance diet as tolerated  -monitor for improvement, keep on continuous pulse ox  -if patient develops respiratory distress or difficulty controlling secretions, page ENT and anesthesia STAT, can intubate orally given no tongue, oral cavity, or OP involvement  -MICU care   -no need for repeat FOE unless having acute respiratory symptoms - SOB, stridor, sensation of throat closing

## 2018-09-22 NOTE — PROGRESS NOTE ADULT - SUBJECTIVE AND OBJECTIVE BOX
Pt seen and examined this AM in MICU.   Woke up easily, continues to deny respiratory symptoms, throat closing sensation, trouble swallowing/voice changes  He feels swelling has improved     AVSS  NAD, awake and alert this AM   Breathing comfortably on RA, no stridor/stertor  Upper lip swelling slightly improved, however swelling now involves the lower lip and some over the bilateral lower cheeks/chin area.   Patient speaking full sentences with no drooling, no issues controlling secretion  Tongue without swelling, oral cavity and OP clear without swelling, uvula midline and no involvement.     A/P: type I angioedema, mild progression but now stabilized  Continue angioedema cocktail per protocol:   -IV decadron 10mg q6hr   -IV benadryl 50mg q8hr  -IV pepcid 20mg q12hrs  -consider giving FFP as this is likely bradykinin mediated   -monitor for symptomatic improvement, keep on continuous pulse ox  -keep NPO until swelling improves   -if patient develops respiratory distress or difficulty controlling secretions, page ENT and Anesthesia STAT, can intubate orally given no tongue, oral cavity, or OP involvement  -MICU care   -strict glucose control   -no need for repeat FOE unless having acute respiratory symptoms - SOB, stridor, sensation of throat closing  -discussed with chief resident this AM

## 2018-09-22 NOTE — PROGRESS NOTE ADULT - SUBJECTIVE AND OBJECTIVE BOX
Ethan Riley, PGY1  MICU Team  [phone]    SUBJECTIVE: Patient seen and examined at bedside.     Interval Events:    OBJECTIVE:    VITAL SIGNS:  ICU Vital Signs Last 24 Hrs  T(C): 35.7 (22 Sep 2018 08:00), Max: 36.6 (21 Sep 2018 16:26)  T(F): 96.3 (22 Sep 2018 08:00), Max: 97.8 (21 Sep 2018 16:26)  HR: 73 (22 Sep 2018 08:00) (63 - 74)  BP: 123/91 (22 Sep 2018 08:00) (113/86 - 186/110)  BP(mean): 97 (22 Sep 2018 08:00) (89 - 133)  ABP: --  ABP(mean): --  RR: 14 (22 Sep 2018 08:00) (14 - 20)  SpO2: 96% (22 Sep 2018 08:00) (96% - 100%)         @ 07:  -   @ 07:00  --------------------------------------------------------  IN: 100 mL / OUT: 1500 mL / NET: -1400 mL     @ 07:  -   @ 12:01  --------------------------------------------------------  IN: 0 mL / OUT: 225 mL / NET: -225 mL      CAPILLARY BLOOD GLUCOSE          PHYSICAL EXAM:    General:   HEENT:   Neck:  Respiratory:   Cardiovascular:   Abdomen:   Extremities:   Skin:   Neurological:    MEDICATIONS:  MEDICATIONS  (STANDING):  dexamethasone  IVPB 10 milliGRAM(s) IV Intermittent every 6 hours  dextrose 5% + sodium chloride 0.45%. 1000 milliLiter(s) (75 mL/Hr) IV Continuous <Continuous>  diphenhydrAMINE   Injectable 50 milliGRAM(s) IV Push every 8 hours  famotidine Injectable 20 milliGRAM(s) IV Push two times a day  heparin  Injectable 5000 Unit(s) SubCutaneous every 8 hours  influenza   Vaccine 0.5 milliLiter(s) IntraMuscular once    MEDICATIONS  (PRN):      ALLERGIES:  Allergies    ramipril (Angioedema)    Intolerances        LABS:                        14.0   7.33  )-----------( 346      ( 22 Sep 2018 07:27 )             47.1     CBC Full  -  ( 22 Sep 2018 07:27 )  WBC Count : 7.33 K/uL  Hemoglobin : 14.0 g/dL  Hematocrit : 47.1 %  Platelet Count - Automated : 346 K/uL  Mean Cell Volume : 87.9 fL  Mean Cell Hemoglobin : 26.1 pg  Mean Cell Hemoglobin Concentration : 29.7 %  Auto Neutrophil # : x  Auto Lymphocyte # : x  Auto Monocyte # : x  Auto Eosinophil # : x  Auto Basophil # : x  Auto Neutrophil % : x  Auto Lymphocyte % : x  Auto Monocyte % : x  Auto Eosinophil % : x  Auto Basophil % : x        140  |  101  |  18  ----------------------------<  115<H>  4.2   |  26  |  1.03    Ca    9.8      22 Sep 2018 07:27  Phos  3.8       Mg     2.1         TPro  8.2  /  Alb  3.7  /  TBili  0.3  /  DBili  x   /  AST  19  /  ALT  13  /  AlkPhos  70      Creatinine Trend: 1.03<--, 1.01<--  LIVER FUNCTIONS - ( 21 Sep 2018 17:40 )  Alb: 3.7 g/dL / Pro: 8.2 g/dL / ALK PHOS: 70 u/L / ALT: 13 u/L / AST: 19 u/L / GGT: x           PT/INR - ( 21 Sep 2018 17:40 )   PT: 11.6 SEC;   INR: 1.04          PTT - ( 21 Sep 2018 17:40 )  PTT:33.2 SEC        Urinalysis Basic - ( 21 Sep 2018 17:40 )    Color: COLORLESS / Appearance: CLEAR / S.007 / pH: 6.5  Gluc: NEGATIVE / Ketone: NEGATIVE  / Bili: NEGATIVE / Urobili: NORMAL   Blood: NEGATIVE / Protein: NEGATIVE / Nitrite: NEGATIVE   Leuk Esterase: NEGATIVE / RBC: x / WBC x   Sq Epi: x / Non Sq Epi: x / Bacteria: x        EKG:   MICROBIOLOGY:    IMAGING:    RADIOLOGY & ADDITIONAL TESTS: Reviewed. Ethan Riley, PGY1  MICU Team  [phone]    SUBJECTIVE: Patient seen and examined at bedside. pt states his lip is more swollen, continues to have no SOB/difficulties swallowing.     Interval Events: Seen by ENT and given further recommendations     OBJECTIVE:    VITAL SIGNS:  ICU Vital Signs Last 24 Hrs  T(C): 35.7 (22 Sep 2018 08:00), Max: 36.6 (21 Sep 2018 16:26)  T(F): 96.3 (22 Sep 2018 08:00), Max: 97.8 (21 Sep 2018 16:26)  HR: 73 (22 Sep 2018 08:00) (63 - 74)  BP: 123/91 (22 Sep 2018 08:00) (113/86 - 186/110)  BP(mean): 97 (22 Sep 2018 08:00) (89 - 133)  ABP: --  ABP(mean): --  RR: 14 (22 Sep 2018 08:00) (14 - 20)  SpO2: 96% (22 Sep 2018 08:00) (96% - 100%)         @ 07:  -   @ 07:00  --------------------------------------------------------  IN: 100 mL / OUT: 1500 mL / NET: -1400 mL     @ 07:01  -   @ 12:01  --------------------------------------------------------  IN: 0 mL / OUT: 225 mL / NET: -225 mL      CAPILLARY BLOOD GLUCOSE          Physical Exam: Vital Signs Last 24 Hrs   GENERAL APPEARANCE: NAD, drowsy  HEENT:  NC/AT, clear conjunctiva, angioedema of upper and lower lips, no swelling apprieciated within the oropharnyx  NECK: Neck supple, non-tender without lymphadenopathy, masses. No stridor  CARDIAC: Normal S1 and S2. No S3, S4 or murmurs. Rhythm is regular.  LUNGS: Clear to auscultation and percussion without rales, rhonchi, wheezing or diminished breath sounds. no upper airway sounds.  ABDOMEN: Soft, nondistended, nontender. No guarding or rebound.   MUSKULOSKELETAL: No joint erythema or tenderness.   EXTREMITIES: No edema.  NEUROLOGICAL: No focal neurologic deficit. AO to self, place and time.  SKIN: Skin clean, dry, intact	    MEDICATIONS:  MEDICATIONS  (STANDING):  dexamethasone  IVPB 10 milliGRAM(s) IV Intermittent every 6 hours  dextrose 5% + sodium chloride 0.45%. 1000 milliLiter(s) (75 mL/Hr) IV Continuous <Continuous>  diphenhydrAMINE   Injectable 50 milliGRAM(s) IV Push every 8 hours  famotidine Injectable 20 milliGRAM(s) IV Push two times a day  heparin  Injectable 5000 Unit(s) SubCutaneous every 8 hours  influenza   Vaccine 0.5 milliLiter(s) IntraMuscular once    MEDICATIONS  (PRN):      ALLERGIES:  Allergies    ramipril (Angioedema)    Intolerances        LABS:                        14.0   7.33  )-----------( 346      ( 22 Sep 2018 07:27 )             47.1     CBC Full  -  ( 22 Sep 2018 07:27 )  WBC Count : 7.33 K/uL  Hemoglobin : 14.0 g/dL  Hematocrit : 47.1 %  Platelet Count - Automated : 346 K/uL  Mean Cell Volume : 87.9 fL  Mean Cell Hemoglobin : 26.1 pg  Mean Cell Hemoglobin Concentration : 29.7 %  Auto Neutrophil # : x  Auto Lymphocyte # : x  Auto Monocyte # : x  Auto Eosinophil # : x  Auto Basophil # : x  Auto Neutrophil % : x  Auto Lymphocyte % : x  Auto Monocyte % : x  Auto Eosinophil % : x  Auto Basophil % : x        140  |  101  |  18  ----------------------------<  115<H>  4.2   |  26  |  1.03    Ca    9.8      22 Sep 2018 07:27  Phos  3.8       Mg     2.1         TPro  8.2  /  Alb  3.7  /  TBili  0.3  /  DBili  x   /  AST  19  /  ALT  13  /  AlkPhos  70      Creatinine Trend: 1.03<--, 1.01<--  LIVER FUNCTIONS - ( 21 Sep 2018 17:40 )  Alb: 3.7 g/dL / Pro: 8.2 g/dL / ALK PHOS: 70 u/L / ALT: 13 u/L / AST: 19 u/L / GGT: x           PT/INR - ( 21 Sep 2018 17:40 )   PT: 11.6 SEC;   INR: 1.04          PTT - ( 21 Sep 2018 17:40 )  PTT:33.2 SEC        Urinalysis Basic - ( 21 Sep 2018 17:40 )    Color: COLORLESS / Appearance: CLEAR / S.007 / pH: 6.5  Gluc: NEGATIVE / Ketone: NEGATIVE  / Bili: NEGATIVE / Urobili: NORMAL   Blood: NEGATIVE / Protein: NEGATIVE / Nitrite: NEGATIVE   Leuk Esterase: NEGATIVE / RBC: x / WBC x   Sq Epi: x / Non Sq Epi: x / Bacteria: x        EKG:   MICROBIOLOGY:    IMAGING:    RADIOLOGY & ADDITIONAL TESTS: Reviewed.

## 2018-09-23 DIAGNOSIS — R63.8 OTHER SYMPTOMS AND SIGNS CONCERNING FOOD AND FLUID INTAKE: ICD-10-CM

## 2018-09-23 DIAGNOSIS — I10 ESSENTIAL (PRIMARY) HYPERTENSION: ICD-10-CM

## 2018-09-23 DIAGNOSIS — I25.10 ATHEROSCLEROTIC HEART DISEASE OF NATIVE CORONARY ARTERY WITHOUT ANGINA PECTORIS: ICD-10-CM

## 2018-09-23 DIAGNOSIS — N40.0 BENIGN PROSTATIC HYPERPLASIA WITHOUT LOWER URINARY TRACT SYMPTOMS: ICD-10-CM

## 2018-09-23 DIAGNOSIS — E78.5 HYPERLIPIDEMIA, UNSPECIFIED: ICD-10-CM

## 2018-09-23 DIAGNOSIS — Z29.9 ENCOUNTER FOR PROPHYLACTIC MEASURES, UNSPECIFIED: ICD-10-CM

## 2018-09-23 DIAGNOSIS — T78.3XXA ANGIONEUROTIC EDEMA, INITIAL ENCOUNTER: ICD-10-CM

## 2018-09-23 LAB
ALBUMIN SERPL ELPH-MCNC: 4.1 G/DL — SIGNIFICANT CHANGE UP (ref 3.3–5)
ALP SERPL-CCNC: 78 U/L — SIGNIFICANT CHANGE UP (ref 40–120)
ALT FLD-CCNC: 10 U/L — SIGNIFICANT CHANGE UP (ref 4–41)
APPEARANCE UR: CLEAR — SIGNIFICANT CHANGE UP
AST SERPL-CCNC: 21 U/L — SIGNIFICANT CHANGE UP (ref 4–40)
BACTERIA # UR AUTO: NEGATIVE — SIGNIFICANT CHANGE UP
BASOPHILS # BLD AUTO: 0.03 K/UL — SIGNIFICANT CHANGE UP (ref 0–0.2)
BASOPHILS NFR BLD AUTO: 0.1 % — SIGNIFICANT CHANGE UP (ref 0–2)
BILIRUB SERPL-MCNC: 0.3 MG/DL — SIGNIFICANT CHANGE UP (ref 0.2–1.2)
BILIRUB UR-MCNC: NEGATIVE — SIGNIFICANT CHANGE UP
BLOOD UR QL VISUAL: HIGH
BUN SERPL-MCNC: 21 MG/DL — SIGNIFICANT CHANGE UP (ref 7–23)
CALCIUM SERPL-MCNC: 9.7 MG/DL — SIGNIFICANT CHANGE UP (ref 8.4–10.5)
CHLORIDE SERPL-SCNC: 98 MMOL/L — SIGNIFICANT CHANGE UP (ref 98–107)
CO2 SERPL-SCNC: 21 MMOL/L — LOW (ref 22–31)
COLOR SPEC: COLORLESS — SIGNIFICANT CHANGE UP
CREAT SERPL-MCNC: 1.03 MG/DL — SIGNIFICANT CHANGE UP (ref 0.5–1.3)
EOSINOPHIL # BLD AUTO: 0 K/UL — SIGNIFICANT CHANGE UP (ref 0–0.5)
EOSINOPHIL NFR BLD AUTO: 0 % — SIGNIFICANT CHANGE UP (ref 0–6)
GLUCOSE SERPL-MCNC: 133 MG/DL — HIGH (ref 70–99)
GLUCOSE UR-MCNC: NEGATIVE — SIGNIFICANT CHANGE UP
HCT VFR BLD CALC: 44.1 % — SIGNIFICANT CHANGE UP (ref 39–50)
HGB BLD-MCNC: 13.4 G/DL — SIGNIFICANT CHANGE UP (ref 13–17)
HYALINE CASTS # UR AUTO: NEGATIVE — SIGNIFICANT CHANGE UP
IMM GRANULOCYTES # BLD AUTO: 0.17 # — SIGNIFICANT CHANGE UP
IMM GRANULOCYTES NFR BLD AUTO: 0.8 % — SIGNIFICANT CHANGE UP (ref 0–1.5)
KETONES UR-MCNC: NEGATIVE — SIGNIFICANT CHANGE UP
LEUKOCYTE ESTERASE UR-ACNC: SIGNIFICANT CHANGE UP
LYMPHOCYTES # BLD AUTO: 1.28 K/UL — SIGNIFICANT CHANGE UP (ref 1–3.3)
LYMPHOCYTES # BLD AUTO: 6.1 % — LOW (ref 13–44)
MAGNESIUM SERPL-MCNC: 2.2 MG/DL — SIGNIFICANT CHANGE UP (ref 1.6–2.6)
MCHC RBC-ENTMCNC: 26.2 PG — LOW (ref 27–34)
MCHC RBC-ENTMCNC: 30.4 % — LOW (ref 32–36)
MCV RBC AUTO: 86.1 FL — SIGNIFICANT CHANGE UP (ref 80–100)
MONOCYTES # BLD AUTO: 0.71 K/UL — SIGNIFICANT CHANGE UP (ref 0–0.9)
MONOCYTES NFR BLD AUTO: 3.4 % — SIGNIFICANT CHANGE UP (ref 2–14)
NEUTROPHILS # BLD AUTO: 18.83 K/UL — HIGH (ref 1.8–7.4)
NEUTROPHILS NFR BLD AUTO: 89.6 % — HIGH (ref 43–77)
NITRITE UR-MCNC: NEGATIVE — SIGNIFICANT CHANGE UP
NRBC # FLD: 0 — SIGNIFICANT CHANGE UP
PH UR: 7 — SIGNIFICANT CHANGE UP (ref 5–8)
PHOSPHATE SERPL-MCNC: 3.1 MG/DL — SIGNIFICANT CHANGE UP (ref 2.5–4.5)
PLATELET # BLD AUTO: 359 K/UL — SIGNIFICANT CHANGE UP (ref 150–400)
PMV BLD: 10.1 FL — SIGNIFICANT CHANGE UP (ref 7–13)
POTASSIUM SERPL-MCNC: 4.4 MMOL/L — SIGNIFICANT CHANGE UP (ref 3.5–5.3)
POTASSIUM SERPL-SCNC: 4.4 MMOL/L — SIGNIFICANT CHANGE UP (ref 3.5–5.3)
PROT SERPL-MCNC: 8.5 G/DL — HIGH (ref 6–8.3)
PROT UR-MCNC: 10 — SIGNIFICANT CHANGE UP
RBC # BLD: 5.12 M/UL — SIGNIFICANT CHANGE UP (ref 4.2–5.8)
RBC # FLD: 13.2 % — SIGNIFICANT CHANGE UP (ref 10.3–14.5)
RBC CASTS # UR COMP ASSIST: HIGH (ref 0–?)
SODIUM SERPL-SCNC: 139 MMOL/L — SIGNIFICANT CHANGE UP (ref 135–145)
SP GR SPEC: 1.01 — SIGNIFICANT CHANGE UP (ref 1–1.04)
SQUAMOUS # UR AUTO: SIGNIFICANT CHANGE UP
UROBILINOGEN FLD QL: NORMAL — SIGNIFICANT CHANGE UP
WBC # BLD: 21.02 K/UL — HIGH (ref 3.8–10.5)
WBC # FLD AUTO: 21.02 K/UL — HIGH (ref 3.8–10.5)
WBC UR QL: SIGNIFICANT CHANGE UP (ref 0–?)

## 2018-09-23 PROCEDURE — 99232 SBSQ HOSP IP/OBS MODERATE 35: CPT | Mod: GC

## 2018-09-23 RX ORDER — HYDRALAZINE HCL 50 MG
25 TABLET ORAL ONCE
Qty: 0 | Refills: 0 | Status: COMPLETED | OUTPATIENT
Start: 2018-09-23 | End: 2018-09-23

## 2018-09-23 RX ORDER — METOPROLOL TARTRATE 50 MG
100 TABLET ORAL DAILY
Qty: 0 | Refills: 0 | Status: DISCONTINUED | OUTPATIENT
Start: 2018-09-23 | End: 2018-09-24

## 2018-09-23 RX ORDER — CLOPIDOGREL BISULFATE 75 MG/1
75 TABLET, FILM COATED ORAL DAILY
Qty: 0 | Refills: 0 | Status: DISCONTINUED | OUTPATIENT
Start: 2018-09-23 | End: 2018-09-24

## 2018-09-23 RX ORDER — FINASTERIDE 5 MG/1
5 TABLET, FILM COATED ORAL DAILY
Qty: 0 | Refills: 0 | Status: DISCONTINUED | OUTPATIENT
Start: 2018-09-23 | End: 2018-09-24

## 2018-09-23 RX ORDER — FAMOTIDINE 10 MG/ML
20 INJECTION INTRAVENOUS DAILY
Qty: 0 | Refills: 0 | Status: DISCONTINUED | OUTPATIENT
Start: 2018-09-23 | End: 2018-09-24

## 2018-09-23 RX ORDER — LORATADINE 10 MG/1
10 TABLET ORAL DAILY
Qty: 0 | Refills: 0 | Status: DISCONTINUED | OUTPATIENT
Start: 2018-09-24 | End: 2018-09-24

## 2018-09-23 RX ORDER — DIPHENHYDRAMINE HCL 50 MG
50 CAPSULE ORAL EVERY 8 HOURS
Qty: 0 | Refills: 0 | Status: DISCONTINUED | OUTPATIENT
Start: 2018-09-23 | End: 2018-09-23

## 2018-09-23 RX ORDER — ATORVASTATIN CALCIUM 80 MG/1
80 TABLET, FILM COATED ORAL AT BEDTIME
Qty: 0 | Refills: 0 | Status: DISCONTINUED | OUTPATIENT
Start: 2018-09-23 | End: 2018-09-24

## 2018-09-23 RX ADMIN — Medication 50 MILLIGRAM(S): at 01:42

## 2018-09-23 RX ADMIN — SODIUM CHLORIDE 75 MILLILITER(S): 9 INJECTION, SOLUTION INTRAVENOUS at 08:41

## 2018-09-23 RX ADMIN — FAMOTIDINE 20 MILLIGRAM(S): 10 INJECTION INTRAVENOUS at 11:31

## 2018-09-23 RX ADMIN — Medication 102 MILLIGRAM(S): at 00:18

## 2018-09-23 RX ADMIN — Medication 25 MILLIGRAM(S): at 21:31

## 2018-09-23 RX ADMIN — HEPARIN SODIUM 5000 UNIT(S): 5000 INJECTION INTRAVENOUS; SUBCUTANEOUS at 14:08

## 2018-09-23 RX ADMIN — Medication 102 MILLIGRAM(S): at 06:22

## 2018-09-23 RX ADMIN — Medication 100 MILLIGRAM(S): at 12:56

## 2018-09-23 RX ADMIN — ATORVASTATIN CALCIUM 80 MILLIGRAM(S): 80 TABLET, FILM COATED ORAL at 21:31

## 2018-09-23 RX ADMIN — CLOPIDOGREL BISULFATE 75 MILLIGRAM(S): 75 TABLET, FILM COATED ORAL at 11:31

## 2018-09-23 RX ADMIN — HEPARIN SODIUM 5000 UNIT(S): 5000 INJECTION INTRAVENOUS; SUBCUTANEOUS at 06:23

## 2018-09-23 RX ADMIN — FAMOTIDINE 20 MILLIGRAM(S): 10 INJECTION INTRAVENOUS at 06:23

## 2018-09-23 RX ADMIN — FINASTERIDE 5 MILLIGRAM(S): 5 TABLET, FILM COATED ORAL at 12:56

## 2018-09-23 RX ADMIN — HEPARIN SODIUM 5000 UNIT(S): 5000 INJECTION INTRAVENOUS; SUBCUTANEOUS at 21:31

## 2018-09-23 NOTE — PROGRESS NOTE ADULT - SUBJECTIVE AND OBJECTIVE BOX
Patient seen and examined at bedside.    Feels like the swelling has significantly improved. Continues to deny respiratory symptoms, throat closing sensation, trouble swallowing or voice changes. Transitioned to [facility] soft diet, has not had breakfast yet.    AVSS  NAD, awake and alert this AM   Breathing comfortably on RA, no stridor/stertor  Trace remaining upper lip swelling, largely resolved lower lip swelling  OC/OP: tongue, FOM, soft palate, uvula wnl, posterior OP clear    A/P  83M admitted for management of type I angioedema, initially with mild progression but now significantly improved.  -continue redosing IV decadron 10mg q8h while in house until complete resolution of edema and may transition to medrol dose pack upon discharge   -d/c benadryl  -transition to PO pepcid for one more week  -ok for discharge per ORL standpoint once medically cleared  -call/page with questions otherwise ORL signing off

## 2018-09-23 NOTE — PROGRESS NOTE ADULT - SUBJECTIVE AND OBJECTIVE BOX
HPI:  83 M w/ BPH, CAD s/p PCI and CABG, HTN p/w lip swelling since yesterday. Pt reports that it initially started on his right upper lip but progressed to his entire upper lip and now progressing to his lower lip. Denies any new medications, detergents/lotions/cosmetic products, or new food exposure. Endorsed some chest pain earlier but reports now resolved. Denies any SOB, cough, or dysphagia. No fevers/chills, abd pain, nausea, vomiting, constipation, or diarrhea. Endorses urinary frequency but denies any dysuria or hematuria. Pt reports being on ramipril for CAD and took 2 doses today because he "did not feel well". He was seen by ENT in the ED w/ bedside scope revealing no airway compromise. MICU consulted due to progressively worsening angioedema and patient admitted to MICU for airway monitoring.  At baseline, pt functional, independent in ADLs, and a/ox3    MICU Course:  The patient was admitted to the MICU to manage angioedema and airway protection. He was started on dexamethasone, benadryl, and was given a unit of FFP. As the patient's symptoms resolved he was transitioned to PO medication. He is on Prednisone 30mg qd on a taper of 10 mg every 2 days. His carter was removed today, follow up with trial of void.     Subjective:    Patient is a 83y old  Male who presents with a chief complaint of Angioedema (23 Sep 2018 10:46) - no new issues since coming out of the MICU; airway in tact, pt is AOx3    Overnight events:    MEDICATIONS  (STANDING):  atorvastatin 80 milliGRAM(s) Oral at bedtime  clopidogrel Tablet 75 milliGRAM(s) Oral daily  famotidine    Tablet 20 milliGRAM(s) Oral daily  finasteride 5 milliGRAM(s) Oral daily  heparin  Injectable 5000 Unit(s) SubCutaneous every 8 hours  metoprolol succinate  milliGRAM(s) Oral daily    MEDICATIONS  (PRN):    Objective:    Vitals: Vital Signs Last 24 Hrs  T(C): 36.4 (09-23-18 @ 16:00), Max: 36.4 (09-23-18 @ 12:00)  T(F): 97.5 (09-23-18 @ 16:00), Max: 97.5 (09-23-18 @ 12:00)  HR: 92 (09-23-18 @ 16:00) (59 - 92)  BP: 127/97 (09-23-18 @ 16:00) (109/96 - 163/129)  BP(mean): 105 (09-23-18 @ 16:00) (68 - 137)  RR: 19 (09-23-18 @ 16:00) (10 - 24)  SpO2: 98% (09-23-18 @ 16:00) (92% - 100%)                I&O's Summary  23 Sep 2018 07:01  -  23 Sep 2018 17:56  --------------------------------------------------------  IN: 225 mL / OUT: 650 mL / NET: -425 mL    PHYSICAL EXAM:  GENERAL: NAD, well-groomed, well-developed  HEAD:  Atraumatic, Normocephalic  EYES: EOMI, PERRLA, conjunctiva and sclera clear  ENMT: No tonsillar erythema, exudates, or enlargement; Moist mucous membranes, Good dentition, No lesions  NECK: Supple, No JVD, Normal thyroid  CHEST/LUNG: Clear to percussion bilaterally; No rales, rhonchi, wheezing, or rubs  HEART: Regular rate and rhythm; No murmurs, rubs, or gallops  ABDOMEN: Soft, Nontender, Nondistended; Bowel sounds present  EXTREMITIES:  2+ Peripheral Pulses, No clubbing, cyanosis, or edema  LYMPH: No lymphadenopathy noted  SKIN: No rashes or lesions  NERVOUS SYSTEM:  Alert & Oriented X3, Good concentration; Motor Strength 5/5 B/L upper and lower extremities; DTRs 2+ intact and symmetric                                             LABS:  09-23    139  |  98  |  21  ----------------------------<  133<H>  4.4   |  21<L>  |  1.03    Ca    9.7      23 Sep 2018 04:50  Phos  3.1     09-23  Mg     2.2     09-23    TPro  8.5<H>  /  Alb  4.1  /  TBili  0.3  /  DBili  x   /  AST  21  /  ALT  10  /  AlkPhos  78  09-23                13.4   21.02 )-----------( 359      ( 23 Sep 2018 04:50 )             44.1              CAPILLARY BLOOD GLUCOSE HPI:  83 M w/ BPH, CAD s/p PCI and CABG, HTN p/w lip swelling since yesterday. Pt reports that it initially started on his right upper lip but progressed to his entire upper lip and now progressing to his lower lip. Denies any new medications, detergents/lotions/cosmetic products, or new food exposure. Endorsed some chest pain earlier but reports now resolved. Denies any SOB, cough, or dysphagia. No fevers/chills, abd pain, nausea, vomiting, constipation, or diarrhea. Endorses urinary frequency but denies any dysuria or hematuria. Pt reports being on ramipril for CAD and took 2 doses today because he "did not feel well". He was seen by ENT in the ED w/ bedside scope revealing no airway compromise. MICU consulted due to progressively worsening angioedema and patient admitted to MICU for airway monitoring.  At baseline, pt functional, independent in ADLs, and a/ox3    MICU Course:  The patient was admitted to the MICU to manage angioedema and airway protection. He was started on dexamethasone, benadryl, and was given a unit of FFP. As the patient's symptoms resolved he was transitioned to PO medication. He is on Prednisone 30mg qd on a taper of 10 mg every 2 days. His carter was removed today, follow up with trial of void.     Subjective:    Patient is a 83y old  Male who presents with a chief complaint of Angioedema (23 Sep 2018 10:46) - no new issues since coming out of the MICU; airway in tact, pt is AOx3    On transfer to the floors patient is agitated and demanding he go back to the MICU. He is irate that someone is in his room (it is a two patient room). When this is explained pt then falsely states another patient was in the bed when he arrived. He then states he has heart problems, tremor, and is "sick" so he needs to be back in the MICU until he is discharged tomorrow. Pt is yelling at staff and uncooperative. Pt refuses to provide ROS other than stating he had chest pain, but denies CP on further questioning.    MEDICATIONS  (STANDING):  atorvastatin 80 milliGRAM(s) Oral at bedtime  clopidogrel Tablet 75 milliGRAM(s) Oral daily  famotidine    Tablet 20 milliGRAM(s) Oral daily  finasteride 5 milliGRAM(s) Oral daily  heparin  Injectable 5000 Unit(s) SubCutaneous every 8 hours  metoprolol succinate  milliGRAM(s) Oral daily    MEDICATIONS  (PRN):    Objective:    Vitals: Vital Signs Last 24 Hrs  T(C): 36.4 (09-23-18 @ 16:00), Max: 36.4 (09-23-18 @ 12:00)  T(F): 97.5 (09-23-18 @ 16:00), Max: 97.5 (09-23-18 @ 12:00)  HR: 92 (09-23-18 @ 16:00) (59 - 92)  BP: 127/97 (09-23-18 @ 16:00) (109/96 - 163/129)  BP(mean): 105 (09-23-18 @ 16:00) (68 - 137)  RR: 19 (09-23-18 @ 16:00) (10 - 24)  SpO2: 98% (09-23-18 @ 16:00) (92% - 100%)                I&O's Summary  23 Sep 2018 07:01  -  23 Sep 2018 17:56  --------------------------------------------------------  IN: 225 mL / OUT: 650 mL / NET: -425 mL    PHYSICAL EXAM:  patient refuses exam    GENERAL: NAD, sitting in wheelchair  NEURO: resting tremor, oriented to self, location, reason for hospitalization. refuses to answer further questions  PSYCH: agitated, pressured speech                                             LABS:  09-23    139  |  98  |  21  ----------------------------<  133<H>  4.4   |  21<L>  |  1.03    Ca    9.7      23 Sep 2018 04:50  Phos  3.1     09-23  Mg     2.2     09-23    TPro  8.5<H>  /  Alb  4.1  /  TBili  0.3  /  DBili  x   /  AST  21  /  ALT  10  /  AlkPhos  78  09-23                13.4   21.02 )-----------( 359      ( 23 Sep 2018 04:50 )             44.1              CAPILLARY BLOOD GLUCOSE

## 2018-09-23 NOTE — PROVIDER CONTACT NOTE (OTHER) - ASSESSMENT
/109, HR 72. Patient was agitated and angry that he was moved from the MICU. No acute distress noted.

## 2018-09-23 NOTE — PROGRESS NOTE ADULT - PROBLEM SELECTOR PLAN 1
- avoid ACEi in the future  - monitor airway; monitor O2 sats  - c/w steroid taper - will continue to trend CBC for WBC given leukocytosis in setting of steroids  - c/w H2 blocker prophylaxis in setting of steroid taper; will DC GI prophylaxis once steroid taper has completed   - f/u ENT recs - avoid ACEi in the future  - monitor airway; monitor O2 sats  - c/w steroid taper - will continue to trend CBC for WBC given leukocytosis in setting of steroids  - c/w H2 blocker prophylaxis in setting of steroid taper; will DC GI prophylaxis once steroid taper has completed   - ENT recs: 1 wk PO pepcid, decadron 10mg Q8H until complete resolution of edema then medrol dose pack on dispo

## 2018-09-23 NOTE — PROGRESS NOTE ADULT - ATTENDING COMMENTS
Pt doing better, resolved angioedema after FFP. Mildly intermittent confusion today likely secondary to benadryl which will be stopped. Pt eating and can restart PO meds and transfer to floor.

## 2018-09-23 NOTE — PROGRESS NOTE ADULT - ASSESSMENT
83 M w/ BPH, CAD s/p PCI and CABG, HTN p/w lip swelling w/ concerning for angioedema 2/2 Ramipril, admitted to MICU for airway protection where airway remained in tact, now on steroid taper

## 2018-09-23 NOTE — PROGRESS NOTE ADULT - SUBJECTIVE AND OBJECTIVE BOX
CHIEF COMPLAINT:Patient is a 83y old  Male who presents with a chief complaint of Angioedema (23 Sep 2018 10:26)        Interval Events:    REVIEW OF SYSTEMS:  Constitutional: [ ] negative [ ] fevers [ ] chills [ ] weight loss [ ] weight gain  HEENT: [ ] negative [ ] dry eyes [ ] eye irritation [ ] postnasal drip [ ] nasal congestion  CV: [ ] negative  [ ] chest pain [ ] orthopnea [ ] palpitations [ ] murmur  Resp: [ ] negative [ ] cough [ ] shortness of breath [ ] dyspnea [ ] wheezing [ ] sputum [ ] hemoptysis  GI: [ ] negative [ ] nausea [ ] vomiting [ ] diarrhea [ ] constipation [ ] abd pain [ ] dysphagia   : [ ] negative [ ] dysuria [ ] nocturia [ ] hematuria [ ] increased urinary frequency  Musculoskeletal: [ ] negative [ ] back pain [ ] myalgias [ ] arthralgias [ ] fracture  Skin: [ ] negative [ ] rash [ ] itch  Neurological: [ ] negative [ ] headache [ ] dizziness [ ] syncope [ ] weakness [ ] numbness  Psychiatric: [ ] negative [ ] anxiety [ ] depression  Endocrine: [ ] negative [ ] diabetes [ ] thyroid problem  Hematologic/Lymphatic: [ ] negative [ ] anemia [ ] bleeding problem  Allergic/Immunologic: [ ] negative [ ] itchy eyes [ ] nasal discharge [ ] hives [ ] angioedema  [ ] All other systems negative  [ ] Unable to assess ROS because ________    OBJECTIVE:  ICU Vital Signs Last 24 Hrs  T(C): 35.8 (23 Sep 2018 08:00), Max: 36.2 (22 Sep 2018 12:00)  T(F): 96.5 (23 Sep 2018 08:00), Max: 97.1 (22 Sep 2018 12:00)  HR: 65 (23 Sep 2018 10:00) (59 - 79)  BP: 133/86 (23 Sep 2018 10:00) (109/96 - 163/129)  BP(mean): 94 (23 Sep 2018 10:00) (68 - 137)  ABP: --  ABP(mean): --  RR: 16 (23 Sep 2018 10:00) (10 - 23)  SpO2: 100% (23 Sep 2018 10:00) (92% - 100%)         @ 07:01  -   @ 07:00  --------------------------------------------------------  IN: 1566 mL / OUT: 1215 mL / NET: 351 mL     @ 07:01   @ 10:46  --------------------------------------------------------  IN: 75 mL / OUT: 175 mL / NET: -100 mL      CAPILLARY BLOOD GLUCOSE          PHYSICAL EXAM:  General:   HEENT:   Lymph Nodes:  Neck:   Respiratory:   Cardiovascular:   Abdomen:   Extremities:   Skin:   Neurological:  Psychiatry:    LINES:    HOSPITAL MEDICATIONS:  Standing Meds:  atorvastatin 80 milliGRAM(s) Oral at bedtime  clopidogrel Tablet 75 milliGRAM(s) Oral daily  famotidine    Tablet 20 milliGRAM(s) Oral daily  finasteride 5 milliGRAM(s) Oral daily  heparin  Injectable 5000 Unit(s) SubCutaneous every 8 hours  influenza   Vaccine 0.5 milliLiter(s) IntraMuscular once  metoprolol succinate  milliGRAM(s) Oral daily      PRN Meds:      LABS:                        13.4   . )-----------( 359      ( 23 Sep 2018 04:50 )             44.1     Hgb Trend: 13.4<--, 14.0<--, 12.0<--      139  |  98  |  21  ----------------------------<  133<H>  4.4   |  21<L>  |  1.03    Ca    9.7      23 Sep 2018 04:50  Phos  3.1       Mg     2.2         TPro  8.5<H>  /  Alb  4.1  /  TBili  0.3  /  DBili  x   /  AST  21  /  ALT  10  /  AlkPhos  78      Creatinine Trend: 1.03<--, 1.03<--, 1.01<--  PT/INR - ( 21 Sep 2018 17:40 )   PT: 11.6 SEC;   INR: 1.04          PTT - ( 21 Sep 2018 17:40 )  PTT:33.2 SEC  Urinalysis Basic - ( 21 Sep 2018 17:40 )    Color: COLORLESS / Appearance: CLEAR / S.007 / pH: 6.5  Gluc: NEGATIVE / Ketone: NEGATIVE  / Bili: NEGATIVE / Urobili: NORMAL   Blood: NEGATIVE / Protein: NEGATIVE / Nitrite: NEGATIVE   Leuk Esterase: NEGATIVE / RBC: x / WBC x   Sq Epi: x / Non Sq Epi: x / Bacteria: x            MICROBIOLOGY:     RADIOLOGY:  [ ] Reviewed and interpreted by me    EKG: CHIEF COMPLAINT:Patient is a 83y old  Male who presents with a chief complaint of Angioedema (23 Sep 2018 10:26)      Subjective:   Interval Events:    REVIEW OF SYSTEMS:  Constitutional: [x ] negative [ ] fevers [ ] chills [ ] weight loss [ ] weight gain  HEENT: [x ] negative [ ] dry eyes [ ] eye irritation [ ] postnasal drip [ ] nasal congestion  CV: [x ] negative  [ ] chest pain [ ] orthopnea [ ] palpitations [ ] murmur  Resp: [x ] negative [ ] cough [ ] shortness of breath [ ] dyspnea [ ] wheezing [ ] sputum [ ] hemoptysis  GI: [x] negative [ ] nausea [ ] vomiting [ ] diarrhea [ ] constipation [ ] abd pain [ ] dysphagia   : x[ ] negative [ ] dysuria [ ] nocturia [ ] hematuria [ ] increased urinary frequency  Musculoskeletal: [ ] negative [x ] back pain [ ] myalgias [ ] arthralgias [ ] fracture  Skin: [x ] negative [ ] rash [ ] itch  Neurological: [x ] negative [ ] headache [ ] dizziness [ ] syncope [ ] weakness [ ] numbness  Psychiatric: [x ] negative [ ] anxiety [ ] depression  Endocrine: [ x] negative [ ] diabetes [ ] thyroid problem  Hematologic/Lymphatic: [ x] negative [ ] anemia [ ] bleeding problem  Allergic/Immunologic: x[ ] negative [ ] itchy eyes [ ] nasal discharge [ ] hives [ ] angioedema  [ x] All other systems negative  [ ] Unable to assess ROS because ________    OBJECTIVE:  ICU Vital Signs Last 24 Hrs  T(C): 35.8 (23 Sep 2018 08:00), Max: 36.2 (22 Sep 2018 12:00)  T(F): 96.5 (23 Sep 2018 08:00), Max: 97.1 (22 Sep 2018 12:00)  HR: 65 (23 Sep 2018 10:00) (59 - 79)  BP: 133/86 (23 Sep 2018 10:00) (109/96 - 163/129)  BP(mean): 94 (23 Sep 2018 10:00) (68 - 137)  ABP: --  ABP(mean): --  RR: 16 (23 Sep 2018 10:00) (10 - 23)  SpO2: 100% (23 Sep 2018 10:00) (92% - 100%)         @ 07:01  -  09-23 @ 07:00  --------------------------------------------------------  IN: 1566 mL / OUT: 1215 mL / NET: 351 mL     @ 07: @ 10:46  --------------------------------------------------------  IN: 75 mL / OUT: 175 mL / NET: -100 mL    PHYSICAL EXAM:  GENERAL: NAD, well-developed  HEAD:  Atraumatic, Normocephalic  EYES: EOMI, PERRLA, conjunctiva and sclera clear  NECK: Supple, No JVD  CHEST/LUNG: Clear to auscultation bilaterally; No rhonchis, rales, or wheezing  HEART: Regular rate and rhythm; S1, S2; No murmurs, rubs, or gallops  ABDOMEN: Soft, Nontender, Nondistended; Normoactive bowel sounds  EXTREMITIES:  2+ Peripheral Pulses, No clubbing, cyanosis, or edema  PSYCH: A&Ox3  NEUROLOGY: non-focal  SKIN: No rashes or lesions      HOSPITAL MEDICATIONS:  Standing Meds:  atorvastatin 80 milliGRAM(s) Oral at bedtime  clopidogrel Tablet 75 milliGRAM(s) Oral daily  famotidine    Tablet 20 milliGRAM(s) Oral daily  finasteride 5 milliGRAM(s) Oral daily  heparin  Injectable 5000 Unit(s) SubCutaneous every 8 hours  influenza   Vaccine 0.5 milliLiter(s) IntraMuscular once  metoprolol succinate  milliGRAM(s) Oral daily    LABS:                        13.4   21.02 )-----------( 359      ( 23 Sep 2018 04:50 )             44.1     Hgb Trend: 13.4<--, 14.0<--, 12.0<--      139  |  98  |  21  ----------------------------<  133<H>  4.4   |  21<L>  |  1.03    Ca    9.7      23 Sep 2018 04:50  Phos  3.1       Mg     2.2         TPro  8.5<H>  /  Alb  4.1  /  TBili  0.3  /  DBili  x   /  AST  21  /  ALT  10  /  AlkPhos  78      Creatinine Trend: 1.03<--, 1.03<--, 1.01<--  PT/INR - ( 21 Sep 2018 17:40 )   PT: 11.6 SEC;   INR: 1.04          PTT - ( 21 Sep 2018 17:40 )  PTT:33.2 SEC  Urinalysis Basic - ( 21 Sep 2018 17:40 )    Color: COLORLESS / Appearance: CLEAR / S.007 / pH: 6.5  Gluc: NEGATIVE / Ketone: NEGATIVE  / Bili: NEGATIVE / Urobili: NORMAL   Blood: NEGATIVE / Protein: NEGATIVE / Nitrite: NEGATIVE   Leuk Esterase: NEGATIVE / RBC: x / WBC x   Sq Epi: x / Non Sq Epi: x / Bacteria: x

## 2018-09-23 NOTE — CHART NOTE - NSCHARTNOTEFT_GEN_A_CORE
Patient seen and examined at bedside.    Complaining of burning in the urine with the carter.  It started a few hours ago.  Will send a UA for UTI.    His BP was 189/103.  Has a slight headache but no visual changes.  Gave hydralazine 25mg PO and will monitor.

## 2018-09-23 NOTE — PROGRESS NOTE ADULT - ASSESSMENT
83 M w/ BPH, CAD s/p PCI and CABG, HTN a/w angioedema suspected 2/2 to ACEi. Pt admitted to MICU for airway monitoring.    #Neuro  -AxO to self, place and time this AM. moving all extremities. low concern    #Resp  -No respiratory distress, airway protected at this time, continue to monitor  -if patient develops respiratory distress or difficulty controlling secretions, page ENT and Anesthesia STAT, can intubate orally given no tongue, oral cavity, or OP involvement  -ENT found no swelling within oropharynx or trachea, all swelling is anterior  --monitor for symptomatic improvement, keep on continuous pulse ox  --no need for repeat FOE as per ENT    #CVS  -Hemodynamically stable, continue to monitor  -Chest pain resolved. EKG w/ no changes, troponin negative  -Hold ramipril for now, consider ARB in the future    #GI  -NPO until swelling improves as per ENT  -Will give D5 maintenance in context of not eating    #Renal  -Schmidt placed due to urinary retention in ER  -TOV when patient completed tx for angioedema as benadryl is anticholinergic    #Heme  -No issues, continue to monitor  -T&S and then one unit of FFP as this is possibly bradykinin mediated  -will check C1 esterase inhibitor     #ID  -Afebrile, no leukocytosis. Monitor off abx    #Endo  -No issues, continue to monitor    #DVT prophylaxis  -SubQ heparin    #Ethics  -Full code 83 M w/ BPH, CAD s/p PCI and CABG, HTN a/w angioedema suspected 2/2 to ACEi. Pt admitted to MICU for airway monitoring.    #Neuro  -AxO to self, place and time this AM. moving all extremities. low concern    #Resp  -No respiratory distress, airway protected at this time, continue to monitor  -if patient develops respiratory distress or difficulty controlling secretions, page ENT and Anesthesia STAT, can intubate orally given no tongue, oral cavity, or OP involvement  -ENT found no swelling within oropharynx or trachea, all swelling is anterior  --monitor for symptomatic improvement, keep on continuous pulse ox  --no need for repeat FOE as per ENT  - switch dexamethasone to prednisone 30 mg qd and decrease by 10 mg every two days.   - d/c benadryl  - s/p 1 unit of ffp.    #CVS  -Hemodynamically stable, continue to monitor  -Chest pain resolved. EKG w/ no changes, troponin negative  -Hold ramipril for now, consider ARB in the future    #GI  -NPO until swelling improves as per ENT  -Will give D5 maintenance in context of not eating    #Renal  -restart BPH meds and D/C carter/trial of void    #Heme  -No issues, continue to monitor  -T&S and then one unit of FFP as this is possibly bradykinin mediated  -will check C1 esterase inhibitor     #ID  -Afebrile, no leukocytosis. Monitor off abx    #Endo  -No issues, continue to monitor    #DVT prophylaxis  -SubQ heparin    #Ethics  -Full code

## 2018-09-23 NOTE — CHART NOTE - NSCHARTNOTEFT_GEN_A_CORE
MICU Transfer Note    Transfer from: MICU    Transfer to: ( x ) Medicine    (  ) Telemetry     (   ) RCU        (    ) Palliative         (   ) Stroke Unit          (   ) __________________    Accepting Physician:  Signout given to:     MICU COURSE: 83 M w/ BPH, CAD s/p PCI and CABG, HTN p/w lip swelling since yesterday. Pt reports that it initially started on his right upper lip but progressed to his entire upper lip and now progressing to his lower lip. Denies any new medications, detergents/lotions/cosmetic products, or new food exposure. Endorsed some chest pain earlier but reports now resolved. Denies any SOB, cough, or dysphagia. No fevers/chills, abd pain, nausea, vomiting, constipation, or diarrhea. Endorses urinary frequency but denies any dysuria or hematuria. Pt reports being on ramipril for CAD and took 2 doses today because he "did not feel well". He was seen by ENT in the ED w/ bedside scope revealing no airway compromise. At baseline, pt functional, independent in ADLs, and a/ox3    The patient was admitted to the MICU to manage angioedema and airway protection. He was started on dexamethasone, benadryl, and was given a unit of FFP. As the patient's symptoms resolved he was transitioned to PO medication. He is on Prednisone 30mg qd on a taper of 10 mg every 2 days. His carter was removed today, follow up with trial of void.       ASSESSMENT & PLAN:     84 yo man with angioedema involving face and upper lip.  Sparing the tongue and airway.  Received steroids and H1 H2 blockers, scoped by ENT, some progression of swelling while in ED so admitted to MICU for close monitoring for airway compromise/progression of angioedema which is likely 2/2 ACE inhibitor          FOR FOLLOW UP:  1. angioedema secondary to ramapril use  - currently on prednisone 30 mg, deescalate by 10 mg every 2 days  - on famotidine  - manage hypertension with other antihypertensives  2. BPH  - restarted on flomax today  - f/u trial of void. MICU Transfer Note    Transfer from: MICU    Transfer to: ( x ) Medicine    (  ) Telemetry     (   ) RCU        (    ) Palliative         (   ) Stroke Unit          (   ) __________________    Accepting Physician: Dr. Lundberg  Signout given to:     MICU COURSE: 83 M w/ BPH, CAD s/p PCI and CABG, HTN p/w lip swelling since yesterday. Pt reports that it initially started on his right upper lip but progressed to his entire upper lip and now progressing to his lower lip. Denies any new medications, detergents/lotions/cosmetic products, or new food exposure. Endorsed some chest pain earlier but reports now resolved. Denies any SOB, cough, or dysphagia. No fevers/chills, abd pain, nausea, vomiting, constipation, or diarrhea. Endorses urinary frequency but denies any dysuria or hematuria. Pt reports being on ramipril for CAD and took 2 doses today because he "did not feel well". He was seen by ENT in the ED w/ bedside scope revealing no airway compromise. At baseline, pt functional, independent in ADLs, and a/ox3    The patient was admitted to the MICU to manage angioedema and airway protection. He was started on dexamethasone, benadryl, and was given a unit of FFP. As the patient's symptoms resolved he was transitioned to PO medication. He is on Prednisone 30mg qd on a taper of 10 mg every 2 days. His carter was removed today, follow up with trial of void.       ASSESSMENT & PLAN:     84 yo man with angioedema involving face and upper lip.  Sparing the tongue and airway.  Received steroids and H1 H2 blockers, scoped by ENT, some progression of swelling while in ED so admitted to MICU for close monitoring for airway compromise/progression of angioedema which is likely 2/2 ACE inhibitor          FOR FOLLOW UP:  1. angioedema secondary to ramipril use  - currently on prednisone 30 mg, deescalate by 10 mg every 2 days  - on famotidine  - manage hypertension with other antihypertensives  2. BPH  - restarted on flomax today  - f/u trial of void. MICU Transfer Note    Transfer from: MICU    Transfer to: ( x ) Medicine    (  ) Telemetry     (   ) RCU        (    ) Palliative         (   ) Stroke Unit          (   ) __________________    Accepting Physician: Dr. Lundberg  Signout given to:     MICU COURSE: 83 M w/ BPH, CAD s/p PCI and CABG, HTN p/w lip swelling since yesterday. Pt reports that it initially started on his right upper lip but progressed to his entire upper lip and now progressing to his lower lip. Denies any new medications, detergents/lotions/cosmetic products, or new food exposure. Endorsed some chest pain earlier but reports now resolved. Denies any SOB, cough, or dysphagia. No fevers/chills, abd pain, nausea, vomiting, constipation, or diarrhea. Endorses urinary frequency but denies any dysuria or hematuria. Pt reports being on ramipril for CAD and took 2 doses today because he "did not feel well". He was seen by ENT in the ED w/ bedside scope revealing no airway compromise. At baseline, pt functional, independent in ADLs, and a/ox3    The patient was admitted to the MICU to manage angioedema and airway protection. He was started on dexamethasone, benadryl, and was given a unit of FFP. As the patient's symptoms resolved he was transitioned to PO medication. He is on Prednisone 30mg qd on a taper of 10 mg every 2 days. His carter was removed today, follow up with trial of void.       ASSESSMENT & PLAN:     82 yo man with angioedema involving face and upper lip.  Sparing the tongue and airway.  Received steroids and H1 H2 blockers, scoped by ENT, some progression of swelling while in ED so admitted to MICU for close monitoring for airway compromise/progression of angioedema which is likely 2/2 ACE inhibitor          FOR FOLLOW UP:  1. angioedema secondary to ramipril use  - currently on prednisone 30 mg, deescalate by 10 mg every 2 days  - on famotidine  - manage hypertension with other antihypertensives  2. BPH  - restarted on flomax today  - f/u trial of void.      #Neuro  -Mildly confused, possibly 2/2 to benadryl/steroids in elderly pt. Continue to monitor.     #Resp  -No respiratory distress, airway protected at this time, continue to monitor    #CVS  -Hemodynamically stable, continue to monitor  -Chest pain resolved. EKG w/ no changes, troponin negative  -Hold ramipril for now, consider ARB in the future    #GI  -NPO for now in the event patient needs to be intubated    #Renal  -Carter placed due to urinary retention in ER  -TOV when patient completed tx for angioedema as benadryl is anticholinergic    #Heme  -No issues, continue to monitor    #ID  -Afebrile, no leukocytosis. Monitor off abx    #Endo  -No issues, continue to monitor    #Ethics  -Full code

## 2018-09-24 ENCOUNTER — TRANSCRIPTION ENCOUNTER (OUTPATIENT)
Age: 83
End: 2018-09-24

## 2018-09-24 VITALS
OXYGEN SATURATION: 100 % | TEMPERATURE: 98 F | DIASTOLIC BLOOD PRESSURE: 66 MMHG | SYSTOLIC BLOOD PRESSURE: 122 MMHG | RESPIRATION RATE: 17 BRPM | HEART RATE: 72 BPM

## 2018-09-24 LAB
ALBUMIN SERPL ELPH-MCNC: 3.4 G/DL — SIGNIFICANT CHANGE UP (ref 3.3–5)
ALP SERPL-CCNC: 68 U/L — SIGNIFICANT CHANGE UP (ref 40–120)
ALT FLD-CCNC: 11 U/L — SIGNIFICANT CHANGE UP (ref 4–41)
AST SERPL-CCNC: 11 U/L — SIGNIFICANT CHANGE UP (ref 4–40)
BASOPHILS # BLD AUTO: 0.02 K/UL — SIGNIFICANT CHANGE UP (ref 0–0.2)
BASOPHILS NFR BLD AUTO: 0.1 % — SIGNIFICANT CHANGE UP (ref 0–2)
BILIRUB SERPL-MCNC: 0.2 MG/DL — SIGNIFICANT CHANGE UP (ref 0.2–1.2)
BUN SERPL-MCNC: 21 MG/DL — SIGNIFICANT CHANGE UP (ref 7–23)
C1INH FUNCTIONAL/C1INH TOTAL MFR SERPL: 37 MG/DL — SIGNIFICANT CHANGE UP (ref 21–39)
CALCIUM SERPL-MCNC: 9.4 MG/DL — SIGNIFICANT CHANGE UP (ref 8.4–10.5)
CHLORIDE SERPL-SCNC: 101 MMOL/L — SIGNIFICANT CHANGE UP (ref 98–107)
CO2 SERPL-SCNC: 27 MMOL/L — SIGNIFICANT CHANGE UP (ref 22–31)
CREAT SERPL-MCNC: 1.08 MG/DL — SIGNIFICANT CHANGE UP (ref 0.5–1.3)
EOSINOPHIL # BLD AUTO: 0.01 K/UL — SIGNIFICANT CHANGE UP (ref 0–0.5)
EOSINOPHIL NFR BLD AUTO: 0.1 % — SIGNIFICANT CHANGE UP (ref 0–6)
GLUCOSE SERPL-MCNC: 93 MG/DL — SIGNIFICANT CHANGE UP (ref 70–99)
HCT VFR BLD CALC: 39.8 % — SIGNIFICANT CHANGE UP (ref 39–50)
HGB BLD-MCNC: 12.1 G/DL — LOW (ref 13–17)
IMM GRANULOCYTES # BLD AUTO: 0.06 # — SIGNIFICANT CHANGE UP
IMM GRANULOCYTES NFR BLD AUTO: 0.3 % — SIGNIFICANT CHANGE UP (ref 0–1.5)
LYMPHOCYTES # BLD AUTO: 1.43 K/UL — SIGNIFICANT CHANGE UP (ref 1–3.3)
LYMPHOCYTES # BLD AUTO: 8.2 % — LOW (ref 13–44)
MAGNESIUM SERPL-MCNC: 2.2 MG/DL — SIGNIFICANT CHANGE UP (ref 1.6–2.6)
MCHC RBC-ENTMCNC: 26 PG — LOW (ref 27–34)
MCHC RBC-ENTMCNC: 30.4 % — LOW (ref 32–36)
MCV RBC AUTO: 85.4 FL — SIGNIFICANT CHANGE UP (ref 80–100)
MONOCYTES # BLD AUTO: 1.55 K/UL — HIGH (ref 0–0.9)
MONOCYTES NFR BLD AUTO: 8.9 % — SIGNIFICANT CHANGE UP (ref 2–14)
NEUTROPHILS # BLD AUTO: 14.29 K/UL — HIGH (ref 1.8–7.4)
NEUTROPHILS NFR BLD AUTO: 82.4 % — HIGH (ref 43–77)
NRBC # FLD: 0 — SIGNIFICANT CHANGE UP
PHOSPHATE SERPL-MCNC: 2.7 MG/DL — SIGNIFICANT CHANGE UP (ref 2.5–4.5)
PLATELET # BLD AUTO: 316 K/UL — SIGNIFICANT CHANGE UP (ref 150–400)
PMV BLD: 10 FL — SIGNIFICANT CHANGE UP (ref 7–13)
POTASSIUM SERPL-MCNC: 3.5 MMOL/L — SIGNIFICANT CHANGE UP (ref 3.5–5.3)
POTASSIUM SERPL-SCNC: 3.5 MMOL/L — SIGNIFICANT CHANGE UP (ref 3.5–5.3)
PROT SERPL-MCNC: 7.2 G/DL — SIGNIFICANT CHANGE UP (ref 6–8.3)
RBC # BLD: 4.66 M/UL — SIGNIFICANT CHANGE UP (ref 4.2–5.8)
RBC # FLD: 13.3 % — SIGNIFICANT CHANGE UP (ref 10.3–14.5)
SODIUM SERPL-SCNC: 140 MMOL/L — SIGNIFICANT CHANGE UP (ref 135–145)
WBC # BLD: 17.36 K/UL — HIGH (ref 3.8–10.5)
WBC # FLD AUTO: 17.36 K/UL — HIGH (ref 3.8–10.5)

## 2018-09-24 PROCEDURE — 99239 HOSP IP/OBS DSCHRG MGMT >30: CPT

## 2018-09-24 RX ORDER — FAMOTIDINE 10 MG/ML
1 INJECTION INTRAVENOUS
Qty: 0 | Refills: 0 | COMMUNITY
Start: 2018-09-24

## 2018-09-24 RX ORDER — LORATADINE 10 MG/1
1 TABLET ORAL
Qty: 0 | Refills: 0 | DISCHARGE
Start: 2018-09-24

## 2018-09-24 RX ADMIN — FINASTERIDE 5 MILLIGRAM(S): 5 TABLET, FILM COATED ORAL at 12:28

## 2018-09-24 RX ADMIN — LORATADINE 10 MILLIGRAM(S): 10 TABLET ORAL at 12:28

## 2018-09-24 RX ADMIN — Medication 100 MILLIGRAM(S): at 06:32

## 2018-09-24 RX ADMIN — CLOPIDOGREL BISULFATE 75 MILLIGRAM(S): 75 TABLET, FILM COATED ORAL at 12:28

## 2018-09-24 RX ADMIN — HEPARIN SODIUM 5000 UNIT(S): 5000 INJECTION INTRAVENOUS; SUBCUTANEOUS at 06:32

## 2018-09-24 RX ADMIN — HEPARIN SODIUM 5000 UNIT(S): 5000 INJECTION INTRAVENOUS; SUBCUTANEOUS at 12:28

## 2018-09-24 RX ADMIN — FAMOTIDINE 20 MILLIGRAM(S): 10 INJECTION INTRAVENOUS at 12:28

## 2018-09-24 RX ADMIN — Medication 30 MILLIGRAM(S): at 07:05

## 2018-09-24 NOTE — DISCHARGE NOTE ADULT - PATIENT PORTAL LINK FT
You can access the [facility] Patient Portal, offered by [facility], by registering with the following website: [email or web address]

## 2018-09-24 NOTE — PROGRESS NOTE ADULT - ATTENDING COMMENTS
Pt seen and examined with housestaff. Case d/w patient and housestaff. Pt has no further symptoms of angioedema, normal phonation, eating ok. ENT recs appreciated. Plan to continue medrol dose pack, pepcid. Will need to notify pt's outpatient providers of angioedema related to ACEI to prevent further use in the future. Continue metoprolol. Pt is medically stable for d/c with outpatient follow-up.  Script for rolling walker provided as recommended by PT. D/c planning time 36 mins. Pt seen and examined with housestaff. Case d/w patient and housestaff. Pt has no further symptoms of angioedema, normal phonation, eating ok. ENT recs appreciated. Plan to continue medrol dose pack, pepcid. Will need to notify pt's outpatient providers of angioedema related to ACEI to prevent further use in the future. Continue metoprolol. BPs improved. Serum glucoses WNL. Pt is medically stable for d/c with outpatient follow-up.  Script for rolling walker provided as recommended by PT. D/c planning time 36 mins.

## 2018-09-24 NOTE — DISCHARGE NOTE ADULT - CARE PROVIDER_API CALL
Sivakumar Brooks), Cardiovascular Disease; Internal Medicine  [address] [place] [address]  Phone: [phone]  Fax: [phone] Sivakumar Brooks), Cardiovascular Disease; Internal Medicine  [address] [place] [address]  Phone: [phone]  Fax: [phone]    Mejia Avalos), Otolaryngology  [address] [address]  Phone: [phone]  Fax: [phone]

## 2018-09-24 NOTE — PROGRESS NOTE ADULT - PROBLEM SELECTOR PLAN 1
- avoid ACEi in the future  - monitor airway; monitor O2 sats  - c/w steroid taper - will continue to trend CBC for WBC given leukocytosis in setting of steroids: 30mg 9/24, 20mg 9/25, 10mg 9/26  - c/w H2 blocker prophylaxis in setting of steroid taper; will DC GI prophylaxis once steroid taper has completed   - ENT recs: 1 wk PO pepcid, decadron 10mg Q8H until complete resolution of edema then medrol dose pack on dispo

## 2018-09-24 NOTE — DISCHARGE NOTE ADULT - CARE PLAN
Principal Discharge DX:	Angioedema  Goal:	Manage  Assessment and plan of treatment:	You were admitted to the MICU for airway protection after having lip swelling possibly due to Ramipril.  ENT was consulted, and they didn't find any airway collapse.  The MICU monitored you, and you were started on a course of steroids, which we will taper.  Please do not take ACE inhibitors in the future and discuss this with your primary care provider.  Secondary Diagnosis:	BPH (benign prostatic hyperplasia)  Goal:	Treat  Assessment and plan of treatment:	Please take your medication as prescribed and follow up with your primary care provider within 1-2 weeks upon discharge.  Secondary Diagnosis:	CAD (coronary artery disease)  Goal:	treat  Assessment and plan of treatment:	Please take your medication as prescribed and follow up with your primary care provider within 1-2 weeks upon discharge.  Secondary Diagnosis:	Essential hypertension  Goal:	treat  Assessment and plan of treatment:	Please take your medication as prescribed and follow up with your primary care provider within 1-2 weeks upon discharge.  Do not take ACE inhibitors such as Ramipril in the future.  Secondary Diagnosis:	HLD (hyperlipidemia)  Goal:	treat  Assessment and plan of treatment:	Please take your medication as prescribed and follow up with your primary care provider within 1-2 weeks upon discharge. Principal Discharge DX:	Angioedema  Goal:	Followup with primary care doctor  Assessment and plan of treatment:	You were admitted to the Medical Intensive Care Unit for airway protection after having lip swelling possibly due to Ramipril.  ENT was consulted, and they didn't find any airway collapse.  The MICU monitored you, and you were started on a course of steroids, which we will taper.  Please do not take ACE inhibitors in the future and discuss this with your primary care provider.  Secondary Diagnosis:	BPH (benign prostatic hyperplasia)  Goal:	Continue medications  Assessment and plan of treatment:	Please take your medication as prescribed and follow up with your primary care provider within 1-2 weeks upon discharge.  Secondary Diagnosis:	CAD (coronary artery disease)  Goal:	Continue Medications  Assessment and plan of treatment:	Please take your medication as prescribed and follow up with your primary care provider within 1-2 weeks upon discharge.  Secondary Diagnosis:	Essential hypertension  Goal:	Continue metoprolol. Do NOT take ramipril  Assessment and plan of treatment:	Please take your medication as prescribed and follow up with your primary care provider within 1-2 weeks upon discharge.  Do not take ACE inhibitors such as Ramipril in the future.  Secondary Diagnosis:	HLD (hyperlipidemia)  Goal:	Continue medications  Assessment and plan of treatment:	Please take your medication as prescribed and follow up with your primary care provider within 1-2 weeks upon discharge.

## 2018-09-24 NOTE — PROGRESS NOTE ADULT - SUBJECTIVE AND OBJECTIVE BOX
Authored by Dr Javi Moreno [phone]     Patient is a 83y old  Male who presents with a chief complaint of Angioedema (23 Sep 2018 17:56)    SUBJECTIVE / OVERNIGHT EVENTS: last night pt hypertensive to 189/103 and given hydralazine 25mg     MEDICATIONS  (STANDING):  atorvastatin 80 milliGRAM(s) Oral at bedtime  clopidogrel Tablet 75 milliGRAM(s) Oral daily  famotidine    Tablet 20 milliGRAM(s) Oral daily  finasteride 5 milliGRAM(s) Oral daily  heparin  Injectable 5000 Unit(s) SubCutaneous every 8 hours  loratadine 10 milliGRAM(s) Oral daily  metoprolol succinate  milliGRAM(s) Oral daily  predniSONE   Tablet 30 milliGRAM(s) Oral daily    MEDICATIONS  (PRN):      Vital Signs Last 24 Hrs  T(C): 36.6 (24 Sep 2018 06:25), Max: 36.8 (23 Sep 2018 20:03)  T(F): 97.9 (24 Sep 2018 06:25), Max: 98.2 (23 Sep 2018 20:03)  HR: 86 (24 Sep 2018 06:25) (65 - 92)  BP: 147/89 (24 Sep 2018 06:25) (127/97 - 183/109)  BP(mean): 105 (23 Sep 2018 16:00) (68 - 131)  RR: 18 (24 Sep 2018 06:25) (16 - 24)  SpO2: 99% (24 Sep 2018 06:25) (97% - 100%)  CAPILLARY BLOOD GLUCOSE        I&O's Summary    23 Sep 2018 07:01  -  24 Sep 2018 07:00  --------------------------------------------------------  IN: 225 mL / OUT: 650 mL / NET: -425 mL        PHYSICAL EXAM            LABS:                        12.1   17.36 )-----------( 316      ( 24 Sep 2018 06:20 )             39.8     09-    139  |  98  |  21  ----------------------------<  133<H>  4.4   |  21<L>  |  1.03    Ca    9.7      23 Sep 2018 04:50  Phos  3.1       Mg     2.2         TPro  8.5<H>  /  Alb  4.1  /  TBili  0.3  /  DBili  x   /  AST  21  /  ALT  10  /  AlkPhos  78            Urinalysis Basic - ( 23 Sep 2018 22:40 )    Color: COLORLESS / Appearance: CLEAR / S.008 / pH: 7.0  Gluc: NEGATIVE / Ketone: NEGATIVE  / Bili: NEGATIVE / Urobili: NORMAL   Blood: LARGE / Protein: 10 / Nitrite: NEGATIVE   Leuk Esterase: MODERATE / RBC: 26-50 / WBC 3-5   Sq Epi: OCC / Non Sq Epi: x / Bacteria: NEGATIVE          RADIOLOGY & ADDITIONAL TESTS:    Imaging Personally Reviewed: no new imaging  Consultant(s) Notes Reviewed:  ENT Authored by Dr Javi Moreno [phone]     Patient is a 83y old  Male who presents with a chief complaint of Angioedema (23 Sep 2018 17:56)    SUBJECTIVE / OVERNIGHT EVENTS: last night pt hypertensive to 189/103 and given hydralazine 25mg   This AM states he feels better. He denies any SOB or lip swelling. He only reports swelling in his gums. Pt had told night coverage he has burning with urination, but on questioning he reports burning in his Hands on urination.     MEDICATIONS  (STANDING):  atorvastatin 80 milliGRAM(s) Oral at bedtime  clopidogrel Tablet 75 milliGRAM(s) Oral daily  famotidine    Tablet 20 milliGRAM(s) Oral daily  finasteride 5 milliGRAM(s) Oral daily  heparin  Injectable 5000 Unit(s) SubCutaneous every 8 hours  loratadine 10 milliGRAM(s) Oral daily  metoprolol succinate  milliGRAM(s) Oral daily  predniSONE   Tablet 30 milliGRAM(s) Oral daily    MEDICATIONS  (PRN):      Vital Signs Last 24 Hrs  T(C): 36.6 (24 Sep 2018 06:25), Max: 36.8 (23 Sep 2018 20:03)  T(F): 97.9 (24 Sep 2018 06:25), Max: 98.2 (23 Sep 2018 20:03)  HR: 86 (24 Sep 2018 06:25) (65 - 92)  BP: 147/89 (24 Sep 2018 06:25) (127/97 - 183/109)  BP(mean): 105 (23 Sep 2018 16:00) (68 - 131)  RR: 18 (24 Sep 2018 06:25) (16 - 24)  SpO2: 99% (24 Sep 2018 06:25) (97% - 100%)  CAPILLARY BLOOD GLUCOSE        I&O's Summary    23 Sep 2018 07:01  -  24 Sep 2018 07:00  --------------------------------------------------------  IN: 225 mL / OUT: 650 mL / NET: -425 mL        PHYSICAL EXAM  T(C): 36.7 (18 @ 10:30), Max: 36.8 (18 @ 20:03)  HR: 70 (18 @ 10:30) (70 - 92)  BP: 120/80 (18 @ 10:30) (120/80 - 183/109)  RR: 16 (18 @ 10:30) (16 - 24)  SpO2: 100% (18 @ 10:30) (97% - 100%)  GENERAL: NAD, well-developed, resting comfortably in bed  HEAD:  Atraumatic, Normocephalic  EYES: EOMI, PERRLA, conjunctiva and sclera clear  NECK: Supple, No JVD  CHEST/LUNG: Clear to auscultation bilaterally; No rhonchis, rales, or wheezing  HEART: Regular rate and rhythm; S1, S2; No murmurs, rubs, or gallops  ABDOMEN: Soft, Nontender, Nondistended; Normoactive bowel sounds  EXTREMITIES:  2+ Peripheral Pulses, No clubbing, cyanosis, or edema. +tenderness of LLE (chronic)  PSYCH: A&Ox3  NEUROLOGY: non-focal  SKIN: No rashes or lesions            LABS:                        12.1   17.36 )-----------( 316      ( 24 Sep 2018 06:20 )             39.8     -    139  |  98  |  21  ----------------------------<  133<H>  4.4   |  21<L>  |  1.03    Ca    9.7      23 Sep 2018 04:50  Phos  3.1       Mg     2.2         TPro  8.5<H>  /  Alb  4.1  /  TBili  0.3  /  DBili  x   /  AST  21  /  ALT  10  /  AlkPhos  78            Urinalysis Basic - ( 23 Sep 2018 22:40 )    Color: COLORLESS / Appearance: CLEAR / S.008 / pH: 7.0  Gluc: NEGATIVE / Ketone: NEGATIVE  / Bili: NEGATIVE / Urobili: NORMAL   Blood: LARGE / Protein: 10 / Nitrite: NEGATIVE   Leuk Esterase: MODERATE / RBC: 26-50 / WBC 3-5   Sq Epi: OCC / Non Sq Epi: x / Bacteria: NEGATIVE          RADIOLOGY & ADDITIONAL TESTS:    Imaging Personally Reviewed: no new imaging  Consultant(s) Notes Reviewed:  ENT

## 2018-09-24 NOTE — DISCHARGE NOTE ADULT - MEDICATION SUMMARY - MEDICATIONS TO STOP TAKING
I will STOP taking the medications listed below when I get home from the hospital:    ramipril 2.5 mg oral capsule  -- 1 cap(s) by mouth once a day

## 2018-09-24 NOTE — DISCHARGE NOTE ADULT - HOME CARE AGENCY
[facility] [phone]. Nurse to visit on the day following discharge. Other appropriate services to be arranged thereafter.   Please contact the home care agency at the above phone number if you have not heard from them by approximately 12 noon on the day after your hospital discharge.

## 2018-09-24 NOTE — DISCHARGE NOTE ADULT - ADDITIONAL INSTRUCTIONS
Do not take any ACE inhibitors.  Please take your medication as prescribed.  Please follow up with ENT and your primary care provider within 1-2 weeks of discharge.

## 2018-09-24 NOTE — DISCHARGE NOTE ADULT - PLAN OF CARE
Manage You were admitted to the MICU for airway protection after having lip swelling possibly due to Ramipril.  ENT was consulted, and they didn't find any airway collapse.  The MICU monitored you, and you were started on a course of steroids, which we will taper.  Please do not take ACE inhibitors in the future and discuss this with your primary care provider. Treat Please take your medication as prescribed and follow up with your primary care provider within 1-2 weeks upon discharge. treat Please take your medication as prescribed and follow up with your primary care provider within 1-2 weeks upon discharge.  Do not take ACE inhibitors such as Ramipril in the future. Continue medications Followup with primary care doctor You were admitted to the Medical Intensive Care Unit for airway protection after having lip swelling possibly due to Ramipril.  ENT was consulted, and they didn't find any airway collapse.  The MICU monitored you, and you were started on a course of steroids, which we will taper.  Please do not take ACE inhibitors in the future and discuss this with your primary care provider. Continue Medications Continue metoprolol. Do NOT take ramipril

## 2018-09-24 NOTE — DISCHARGE NOTE ADULT - HOSPITAL COURSE
Pt admitted to the MICU for airway monitoring after taking Ramipril and found to have lip swelling.  ENT consulted; scoped pt and did not find any airway edema or collapse.  Pt monitored in the MICU and started on steroids.  Pt transferred to floors for additional monitoring.  PT stable for discharge home with outpatient ENT and PCP follow up. Pt admitted to the MICU for airway monitoring after taking Ramipril and found to have lip swelling.  ENT consulted; scoped pt and did not find any airway edema or collapse.  Pt monitored in the MICU and started on steroids.  Pt transferred to floors for additional monitoring.  Pt stable for discharge home with outpatient ENT and PCP follow up for further HTN managment.  Pt instructed to complete a Medrol dose pack and take Pepcid for one week while on steroids. Pt admitted to the MICU for airway monitoring after taking Ramipril and found to have lip swelling.  ENT consulted; scoped pt and did not find any airway edema or collapse.  Pt monitored in the MICU and started on steroids.  Pt transferred to floors for additional monitoring.  Pt stable for discharge home with outpatient ENT and PCP follow up for further HTN management.  Pt instructed to complete a Medrol dose pack and take Pepcid for one week while on steroids.     Pt instructed not to take further ramipril or any other ACEI.

## 2018-09-24 NOTE — DISCHARGE NOTE ADULT - MEDICATION SUMMARY - MEDICATIONS TO TAKE
I will START or STAY ON the medications listed below when I get home from the hospital:    Proscar 5 mg oral tablet  -- 1 tab(s) by mouth once a day  -- Indication: For BPH (benign prostatic hyperplasia)    MethylPREDNISolone Dose Pack 4 mg oral tablet  -- 1 packet(s) by mouth once a day MDD:24mg  -- It is very important that you take or use this exactly as directed.  Do not skip doses or discontinue unless directed by your doctor.  Obtain medical advice before taking any non-prescription drugs as some may affect the action of this medication.  Take with food or milk.    -- Indication: For Angioedema    loratadine 10 mg oral tablet  -- 1 tab(s) by mouth once a day  -- Indication: For Angioedema    Lipitor 80 mg oral tablet  -- 1 tab(s) by mouth once a day  -- Indication: For HLD (hyperlipidemia)    Plavix 75 mg oral tablet  -- 1 tab(s) by mouth once a day  -- Indication: For CAD (coronary artery disease)    Metoprolol Succinate  mg oral tablet, extended release  -- 1 tab(s) by mouth once a day  -- Indication: For HTN    famotidine 20 mg oral tablet  -- 1 tab(s) by mouth once a day  -- Indication: For Angioedema

## 2018-09-24 NOTE — DISCHARGE NOTE ADULT - CARE PROVIDERS DIRECT ADDRESSES
[email or web address]

## 2019-06-14 NOTE — ED PROVIDER NOTE - DATE/TIME 1
2nd attempt: Writer attempted to call patient, no response, message left for patient to call clinic.   27-Jun-2018 21:00

## 2019-10-25 ENCOUNTER — EMERGENCY (EMERGENCY)
Facility: HOSPITAL | Age: 84
LOS: 1 days | Discharge: HOME | End: 2019-10-25
Attending: EMERGENCY MEDICINE | Admitting: EMERGENCY MEDICINE
Payer: MEDICARE

## 2019-10-25 VITALS
RESPIRATION RATE: 16 BRPM | DIASTOLIC BLOOD PRESSURE: 76 MMHG | TEMPERATURE: 98 F | OXYGEN SATURATION: 100 % | SYSTOLIC BLOOD PRESSURE: 119 MMHG | HEART RATE: 66 BPM

## 2019-10-25 VITALS
HEART RATE: 67 BPM | DIASTOLIC BLOOD PRESSURE: 88 MMHG | RESPIRATION RATE: 16 BRPM | OXYGEN SATURATION: 100 % | SYSTOLIC BLOOD PRESSURE: 137 MMHG

## 2019-10-25 DIAGNOSIS — I65.21 OCCLUSION AND STENOSIS OF RIGHT CAROTID ARTERY: Chronic | ICD-10-CM

## 2019-10-25 DIAGNOSIS — Z95.5 PRESENCE OF CORONARY ANGIOPLASTY IMPLANT AND GRAFT: Chronic | ICD-10-CM

## 2019-10-25 DIAGNOSIS — Z95.1 PRESENCE OF AORTOCORONARY BYPASS GRAFT: Chronic | ICD-10-CM

## 2019-10-25 LAB
ALBUMIN SERPL ELPH-MCNC: 3.7 G/DL — SIGNIFICANT CHANGE UP (ref 3.3–5)
ALP SERPL-CCNC: 76 U/L — SIGNIFICANT CHANGE UP (ref 40–120)
ALT FLD-CCNC: 21 U/L — SIGNIFICANT CHANGE UP (ref 4–41)
ANION GAP SERPL CALC-SCNC: 13 MMO/L — SIGNIFICANT CHANGE UP (ref 7–14)
APPEARANCE UR: CLEAR — SIGNIFICANT CHANGE UP
AST SERPL-CCNC: 25 U/L — SIGNIFICANT CHANGE UP (ref 4–40)
BACTERIA # UR AUTO: NEGATIVE — SIGNIFICANT CHANGE UP
BASE EXCESS BLDV CALC-SCNC: 4.4 MMOL/L — SIGNIFICANT CHANGE UP
BASOPHILS # BLD AUTO: 0.02 K/UL — SIGNIFICANT CHANGE UP (ref 0–0.2)
BASOPHILS NFR BLD AUTO: 0.4 % — SIGNIFICANT CHANGE UP (ref 0–2)
BASOPHILS NFR SPEC: 0 % — SIGNIFICANT CHANGE UP (ref 0–2)
BILIRUB SERPL-MCNC: 0.2 MG/DL — SIGNIFICANT CHANGE UP (ref 0.2–1.2)
BILIRUB UR-MCNC: NEGATIVE — SIGNIFICANT CHANGE UP
BLASTS # FLD: 0 % — SIGNIFICANT CHANGE UP (ref 0–0)
BLOOD GAS VENOUS - CREATININE: 1.08 MG/DL — SIGNIFICANT CHANGE UP (ref 0.5–1.3)
BLOOD UR QL VISUAL: NEGATIVE — SIGNIFICANT CHANGE UP
BUN SERPL-MCNC: 26 MG/DL — HIGH (ref 7–23)
CALCIUM SERPL-MCNC: 9.7 MG/DL — SIGNIFICANT CHANGE UP (ref 8.4–10.5)
CHLORIDE BLDV-SCNC: 108 MMOL/L — SIGNIFICANT CHANGE UP (ref 96–108)
CHLORIDE SERPL-SCNC: 105 MMOL/L — SIGNIFICANT CHANGE UP (ref 98–107)
CO2 SERPL-SCNC: 23 MMOL/L — SIGNIFICANT CHANGE UP (ref 22–31)
COLOR SPEC: SIGNIFICANT CHANGE UP
CREAT SERPL-MCNC: 1.04 MG/DL — SIGNIFICANT CHANGE UP (ref 0.5–1.3)
ELLIPTOCYTES BLD QL SMEAR: SLIGHT — SIGNIFICANT CHANGE UP
EOSINOPHIL # BLD AUTO: 0.15 K/UL — SIGNIFICANT CHANGE UP (ref 0–0.5)
EOSINOPHIL NFR BLD AUTO: 2.7 % — SIGNIFICANT CHANGE UP (ref 0–6)
EOSINOPHIL NFR FLD: 2.6 % — SIGNIFICANT CHANGE UP (ref 0–6)
GAS PNL BLDV: 139 MMOL/L — SIGNIFICANT CHANGE UP (ref 136–146)
GIANT PLATELETS BLD QL SMEAR: PRESENT — SIGNIFICANT CHANGE UP
GLUCOSE BLDV-MCNC: 65 MG/DL — LOW (ref 70–99)
GLUCOSE SERPL-MCNC: 68 MG/DL — LOW (ref 70–99)
GLUCOSE UR-MCNC: 30 — SIGNIFICANT CHANGE UP
HCO3 BLDV-SCNC: 26 MMOL/L — SIGNIFICANT CHANGE UP (ref 20–27)
HCT VFR BLD CALC: 41.3 % — SIGNIFICANT CHANGE UP (ref 39–50)
HCT VFR BLDV CALC: 34.3 % — LOW (ref 39–51)
HGB BLD-MCNC: 11.4 G/DL — LOW (ref 13–17)
HGB BLDV-MCNC: 11.1 G/DL — LOW (ref 13–17)
HYALINE CASTS # UR AUTO: NEGATIVE — SIGNIFICANT CHANGE UP
IMM GRANULOCYTES NFR BLD AUTO: 0 % — SIGNIFICANT CHANGE UP (ref 0–1.5)
KETONES UR-MCNC: NEGATIVE — SIGNIFICANT CHANGE UP
LACTATE BLDV-MCNC: 1.3 MMOL/L — SIGNIFICANT CHANGE UP (ref 0.5–2)
LEUKOCYTE ESTERASE UR-ACNC: NEGATIVE — SIGNIFICANT CHANGE UP
LYMPHOCYTES # BLD AUTO: 1.55 K/UL — SIGNIFICANT CHANGE UP (ref 1–3.3)
LYMPHOCYTES # BLD AUTO: 28 % — SIGNIFICANT CHANGE UP (ref 13–44)
LYMPHOCYTES NFR SPEC AUTO: 23.9 % — SIGNIFICANT CHANGE UP (ref 13–44)
MCHC RBC-ENTMCNC: 24.5 PG — LOW (ref 27–34)
MCHC RBC-ENTMCNC: 27.6 % — LOW (ref 32–36)
MCV RBC AUTO: 88.6 FL — SIGNIFICANT CHANGE UP (ref 80–100)
METAMYELOCYTES # FLD: 0 % — SIGNIFICANT CHANGE UP (ref 0–1)
MONOCYTES # BLD AUTO: 1.18 K/UL — HIGH (ref 0–0.9)
MONOCYTES NFR BLD AUTO: 21.3 % — HIGH (ref 2–14)
MONOCYTES NFR BLD: 17.7 % — HIGH (ref 2–9)
MYELOCYTES NFR BLD: 0 % — SIGNIFICANT CHANGE UP (ref 0–0)
NEUTROPHIL AB SER-ACNC: 49.6 % — SIGNIFICANT CHANGE UP (ref 43–77)
NEUTROPHILS # BLD AUTO: 2.63 K/UL — SIGNIFICANT CHANGE UP (ref 1.8–7.4)
NEUTROPHILS NFR BLD AUTO: 47.6 % — SIGNIFICANT CHANGE UP (ref 43–77)
NEUTS BAND # BLD: 0 % — SIGNIFICANT CHANGE UP (ref 0–6)
NITRITE UR-MCNC: NEGATIVE — SIGNIFICANT CHANGE UP
NRBC # FLD: 0 K/UL — SIGNIFICANT CHANGE UP (ref 0–0)
OTHER - HEMATOLOGY %: 0 — SIGNIFICANT CHANGE UP
OVALOCYTES BLD QL SMEAR: SLIGHT — SIGNIFICANT CHANGE UP
PCO2 BLDV: 58 MMHG — HIGH (ref 41–51)
PH BLDV: 7.33 PH — SIGNIFICANT CHANGE UP (ref 7.32–7.43)
PH UR: 6 — SIGNIFICANT CHANGE UP (ref 5–8)
PLATELET # BLD AUTO: 237 K/UL — SIGNIFICANT CHANGE UP (ref 150–400)
PLATELET COUNT - ESTIMATE: NORMAL — SIGNIFICANT CHANGE UP
PMV BLD: 10.3 FL — SIGNIFICANT CHANGE UP (ref 7–13)
PO2 BLDV: < 24 MMHG — LOW (ref 35–40)
POIKILOCYTOSIS BLD QL AUTO: SLIGHT — SIGNIFICANT CHANGE UP
POTASSIUM BLDV-SCNC: 5.2 MMOL/L — HIGH (ref 3.4–4.5)
POTASSIUM SERPL-MCNC: 5.4 MMOL/L — HIGH (ref 3.5–5.3)
POTASSIUM SERPL-SCNC: 5.4 MMOL/L — HIGH (ref 3.5–5.3)
PROMYELOCYTES # FLD: 0 % — SIGNIFICANT CHANGE UP (ref 0–0)
PROT SERPL-MCNC: 7.7 G/DL — SIGNIFICANT CHANGE UP (ref 6–8.3)
PROT UR-MCNC: 20 — SIGNIFICANT CHANGE UP
RBC # BLD: 4.66 M/UL — SIGNIFICANT CHANGE UP (ref 4.2–5.8)
RBC # FLD: 14 % — SIGNIFICANT CHANGE UP (ref 10.3–14.5)
RBC CASTS # UR COMP ASSIST: SIGNIFICANT CHANGE UP (ref 0–?)
REVIEW TO FOLLOW: YES — SIGNIFICANT CHANGE UP
SAO2 % BLDV: 21.8 % — LOW (ref 60–85)
SODIUM SERPL-SCNC: 141 MMOL/L — SIGNIFICANT CHANGE UP (ref 135–145)
SP GR SPEC: 1.02 — SIGNIFICANT CHANGE UP (ref 1–1.04)
SQUAMOUS # UR AUTO: SIGNIFICANT CHANGE UP
UROBILINOGEN FLD QL: NORMAL — SIGNIFICANT CHANGE UP
VARIANT LYMPHS # BLD: 6.2 % — SIGNIFICANT CHANGE UP
WBC # BLD: 5.53 K/UL — SIGNIFICANT CHANGE UP (ref 3.8–10.5)
WBC # FLD AUTO: 5.53 K/UL — SIGNIFICANT CHANGE UP (ref 3.8–10.5)
WBC UR QL: SIGNIFICANT CHANGE UP (ref 0–?)

## 2019-10-25 PROCEDURE — 74177 CT ABD & PELVIS W/CONTRAST: CPT | Mod: 26

## 2019-10-25 PROCEDURE — 99284 EMERGENCY DEPT VISIT MOD MDM: CPT

## 2019-10-25 RX ORDER — DEXTROSE 50 % IN WATER 50 %
50 SYRINGE (ML) INTRAVENOUS ONCE
Refills: 0 | Status: COMPLETED | OUTPATIENT
Start: 2019-10-25 | End: 2019-10-25

## 2019-10-25 RX ADMIN — Medication 50 MILLILITER(S): at 14:20

## 2019-10-25 NOTE — ED PROVIDER NOTE - NSFOLLOWUPINSTRUCTIONS_ED_ALL_ED_FT
Follow up with your primary care provider within 1 week, show copies of your results  Follow up with a general surgeon for the right sided inguinal (groin) hernia, referral list provided  Take Tylenol 650mg every 6 hours as needed for pain  Return to the ER with any worsening or concerning symptoms, increased pain or swelling to area, skin changes to area, numbness/tingling, weakness or any other concerns.

## 2019-10-25 NOTE — ED PROVIDER NOTE - PATIENT PORTAL LINK FT
You can access the FollowMyHealth Patient Portal offered by [facility] by registering at the following website: [email or web address]. By joining [employer]’s FollowMyHealth portal, you will also be able to view your health information using other applications (apps) compatible with our system.

## 2019-10-25 NOTE — ED PROVIDER NOTE - CLINICAL SUMMARY MEDICAL DECISION MAKING FREE TEXT BOX
84yM w/pmhx CAD s/p CABG, HTN, HLD, BPH p/w right groin pain. Right inguinal hernia on exam. Will check cbc/cmp/vbg, ua and culture, CT abd/pelvis to r/o strangulated hernia

## 2019-10-25 NOTE — ED PROVIDER NOTE - OBJECTIVE STATEMENT
84yM w/pmhx CAD s/p CABG, HTN, HLD, BPH p/w right groin pain. Pt states he intermittently feels a burning/itching discomfort in the area over the past few months but the past few days it has worsened and become constant. He saw his PMD on 10/21 who referred him to a surgeon for a right inguinal hernia. Pt denies dysuria, urinary frequency, skin changes to area, chest pain, sob, abd pain, n/v/d, fever/chills or any other concerns.

## 2019-10-25 NOTE — ED PROVIDER NOTE - ATTENDING CONTRIBUTION TO CARE
Attending Statement: I have reviewed and agree with all pertinent clinical information, including history and physical exam and agree with treatment plan of the PA, except as noted.  85yo M hx of CAD, hx CABG, HTN, HLD, BPH from home co right groin pain x months. Described as a burning, itchy intermittent pain. Denies any associated nausea/vomit/dysuria no freq or urgency. no testicular pain. no back pain. Saw pmd on 10-21-19 refereed to urologist, went yesterday "was not seen" Family states "he cant wait anymore" no worsening of pain. no trauma.   Vital signs noted. laying down no distress. nontoxic appearing. mmm no resp distress. soft nondistended, tender RLQ no rebound or guarding no palpable hernia noted. no cvat. AO3  plan labs, ct abdomen/pelvis, urine, pain med and re assess

## 2019-10-25 NOTE — ED PROVIDER NOTE - PROGRESS NOTE DETAILS
pt with family pending ct abdomen. Grand daughter very verbally aggressive in ED. Aware pending ct to be done, unfortunately a wait for ct. Explained to pt and family. Requesting to speak with charge, RN informed. SARA Field: Had discussion with pt and grand-daughter and they agree to stay for the CT scan SARA Field: Discussed CT results with radiology as pt has complete occlusion of left common, internal and external iliac arteries and occlusion or right common femoral artery. Likely chronic changes as pt had a vacular arterial study performed in 2016 which showed, " There is bilateral lower extremity arterial vascular disease, more severe on the left, most likely affecting the aorto iliac segments..." Placed call to pts PMD Dr.Salima Leo, [phone], to ensure pt has follow up for next week, pending call back. Pt has equal pulses b/l, sensation intact and equal b/l, strength 5/5 in LE b/l. CT shows small right inguinal hernia with fluid, no bowel strangulation. Pt already has general surgery referral and follow up scheduled. Pt will f/u with his PMD and general surgery.

## 2019-10-25 NOTE — ED PROVIDER NOTE - PHYSICAL EXAMINATION
+bulge noted to right suprapubic region, hernia with mild TTP, able to reduced although swelling to area persists

## 2019-10-25 NOTE — ED ADULT NURSE NOTE - OBJECTIVE STATEMENT
Pt received in intake room 2. alert and oriented x3, ambulatory. Pt co groin pain and burning. Pt states he has an appt with urologist but doctor had to cancel appt. States appt was changed to next month and he can't wait until then. While attempting to draw labs and place IV, patient and his grand daughter were agitated and verbally aggressive to nurse.

## 2019-10-27 LAB
BACTERIA UR CULT: SIGNIFICANT CHANGE UP
SPECIMEN SOURCE: SIGNIFICANT CHANGE UP

## 2021-12-14 NOTE — DISCHARGE NOTE ADULT - NSCORESITESY/N_GEN_A_CORE_RD
[FreeTextEntry1] : 29 yo  with LMP 12/1 presents today for well woman exam. Co ingrown hairs from waxing \par \par \par POB: etopx1\par PGYN: reg, prev on seasonique, self dc'd, uses condoms, never pelvic infection, normal pap\par PMH: denies\par PSH: acl tear repair\par Med: denies\par All: PCN\par SH: denies STACI\par  No

## 2022-11-08 NOTE — ED PROVIDER NOTE - DATE/TIME 3
Glue used to close the laceration.  Do not apply any creams or ointments over the glue as this will degraded early.  In about 30 minutes you can apply the Steri-Strips for extra protection.  Watch for signs of infection like redness and drainage.  Be careful over the next few days as the wound heals as the glue is not as sturdy as stitches.  
21-Sep-2018 19:25

## 2023-01-02 ENCOUNTER — EMERGENCY (EMERGENCY)
Facility: HOSPITAL | Age: 88
LOS: 1 days | Discharge: HOME | End: 2023-01-02
Attending: EMERGENCY MEDICINE | Admitting: EMERGENCY MEDICINE
Payer: MEDICARE

## 2023-01-02 VITALS — HEART RATE: 79 BPM | RESPIRATION RATE: 18 BRPM | OXYGEN SATURATION: 97 % | TEMPERATURE: 98 F

## 2023-01-02 VITALS
SYSTOLIC BLOOD PRESSURE: 114 MMHG | OXYGEN SATURATION: 99 % | RESPIRATION RATE: 18 BRPM | TEMPERATURE: 97 F | DIASTOLIC BLOOD PRESSURE: 59 MMHG | HEART RATE: 96 BPM

## 2023-01-02 DIAGNOSIS — Z95.5 PRESENCE OF CORONARY ANGIOPLASTY IMPLANT AND GRAFT: Chronic | ICD-10-CM

## 2023-01-02 DIAGNOSIS — I65.21 OCCLUSION AND STENOSIS OF RIGHT CAROTID ARTERY: Chronic | ICD-10-CM

## 2023-01-02 DIAGNOSIS — Z95.1 PRESENCE OF AORTOCORONARY BYPASS GRAFT: Chronic | ICD-10-CM

## 2023-01-02 PROCEDURE — 10060 I&D ABSCESS SIMPLE/SINGLE: CPT

## 2023-01-02 PROCEDURE — 99284 EMERGENCY DEPT VISIT MOD MDM: CPT | Mod: 25

## 2023-01-02 RX ORDER — CEPHALEXIN 500 MG
500 CAPSULE ORAL ONCE
Refills: 0 | Status: COMPLETED | OUTPATIENT
Start: 2023-01-02 | End: 2023-01-02

## 2023-01-02 RX ORDER — CEPHALEXIN 500 MG
1 CAPSULE ORAL
Qty: 28 | Refills: 0
Start: 2023-01-02 | End: 2023-01-08

## 2023-01-02 RX ADMIN — Medication 500 MILLIGRAM(S): at 20:14

## 2023-01-02 NOTE — ED PROVIDER NOTE - ATTENDING CONTRIBUTION TO CARE
88yo M with CAD CABG HTN HLD presents for L neck swelling assessment. See DDX above. See lab considerations above. Discussed plan with patient and daughter at bedside who provided further collateral. Agreeable to I&D and dc home after drainage with close f/u with derm/pcp and possibly surgery for excision.

## 2023-01-02 NOTE — ED PROVIDER NOTE - NSFOLLOWUPCLINICS_GEN_ALL_ED_FT
[facility]  General Surgery  [address] [place] [address] - [address] [place] [address]  Phone: [phone]  Fax:

## 2023-01-02 NOTE — ED PROVIDER NOTE - OBJECTIVE STATEMENT
87yM w/pmhx CAD s/p CABG, HTN, HLD, BPH presents for eval of 2 neck masses that are enlarging. Has a swelling in L upper neck at angle of jaw for some time and a smaller swelling over L SCM which in last week has steadily enlarged, become more tender and red so came in. Also having a small hard nodule over R lat chest. Denies fever, sore throat, difficulty breathing or swallowing, weight loss, night sweats, CP, SOB, abd pain, NVDC. 87yM w/pmhx CAD s/p CABG, HTN, HLD, BPH presents for eval of 2 neck masses that are enlarging. Has a swelling in L upper neck at angle of jaw and a smaller swelling over L SCM for 'years' which in last week has steadily enlarged, become more tender and red so came in. Also having a small hard nodule over R lat chest. Denies fever, sore throat, difficulty breathing or swallowing, weight loss, night sweats, CP, SOB, abd pain, NVDC.

## 2023-01-02 NOTE — ED PROVIDER NOTE - NSFOLLOWUPINSTRUCTIONS_ED_ALL_ED_FT
Abscess    An abscess is an infected area that contains a collection of pus and debris. It can occur in almost any part of the body and occurs when the tissue gets infection. Symptoms include a painful mass that is red, warm, tender that might break open and HAVE drainage. If your health care provider gave you antibiotics make sure to take the full course and do not stop even if feeling better.     - Continue antibiotic until finished  - Area will continue to drain, keep area clean and dry. Replace gauze when dirtied  - Can wash area gently, but do not put fingers or other instruments in area.   - Follow up with your DERMATOLOGIST for further assessment including right chest wall lesion    Follow up with primary care provider  Consider a consult with General Surgeon for removal of neck cysts if continue to be a concern.     SEEK IMMEDIATE MEDICAL CARE IF YOU HAVE ANY OF THE FOLLOWING SYMPTOMS: chills, fever, muscle aches, or red streaking from the area.

## 2023-01-02 NOTE — ED PROVIDER NOTE - PATIENT PORTAL LINK FT
You can access the FollowMyHealth Patient Portal offered by [facility] by registering at the following website: [email or web address]. By joining [employer]’s FollowMyHealth portal, you will also be able to view your health information using other applications (apps) compatible with our system.

## 2023-01-02 NOTE — ED PROVIDER NOTE - PHYSICAL EXAMINATION
CONSTITUTIONAL: NAD  SKIN: Warm dry; small oval 1cm dark raised lesion on R lateral chest   HEAD: NCAT  EYES: NL inspection  ENT: MMM  NECK: Supple; non tender. 3cm round soft mobile nodule at upper L neck near angle of jaw - feels possibly cyst vs lipoma; 4cm round erythematous with area of induration and collection at base on neck near SCM. Not clearly consistent with lymph node distrubution, feels cyst/sebaceous but tender.  CARD: RRR  RESP: CTAB  ABD: S/NT no R/G  NEURO: Grossly unremarkable

## 2023-01-02 NOTE — ED ADULT NURSE NOTE - OBJECTIVE STATEMENT
Patient has 2 growths on his neck that have become larger and pus filled. Pt also states he feels one coming under his right arm.  PT A&OX4.  No distress noted.  Breathing non-labored.  PT noted with 2 abcess to left side neck and right pectoral area.  PT ambulatory, able to move all extremities.  Family present at bedside.  Will continue to monitor.

## 2023-01-02 NOTE — ED ADULT TRIAGE NOTE - CHIEF COMPLAINT QUOTE
Patient has 2 growths on his neck that have become larger and pus filled. Pt also states he feels one coming under his right arm.

## 2023-01-02 NOTE — ED PROVIDER NOTE - NSICDXPASTMEDICALHX_GEN_ALL_CORE_FT
PAST MEDICAL HISTORY:  BPH (benign prostatic hyperplasia)     CAD (coronary artery disease)     Essential hypertension     HLD (hyperlipidemia)

## 2023-01-02 NOTE — ED PROVIDER NOTE - DIFFERENTIAL DIAGNOSIS
Differential Diagnosis ddx includes but not limited to sebaceous cyst with overlying infection vs thyroglossal cyst with overlying infection vs lipoma vs less likely mass or lymph node given sxs and distribution.

## 2023-01-02 NOTE — ED PROVIDER NOTE - NSICDXPASTSURGICALHX_GEN_ALL_CORE_FT
PAST SURGICAL HISTORY:  S/P CABG (coronary artery bypass graft)     S/P drug eluting coronary stent placement     Stenosis of right carotid artery s/p endartectomy

## 2023-01-02 NOTE — ED PROVIDER NOTE - TEST CONSIDERED BUT NOT PERFORMED
considered CT and lab work but patient well appearing w/o other red-flag symptoms. Will plan for drainage and dc home with abbx. Tests Considered But Not Performed

## 2023-01-02 NOTE — ED PROVIDER NOTE - CLINICAL SUMMARY MEDICAL DECISION MAKING FREE TEXT BOX
Cezar PGY2: 88yo M with CAD CABG HTN HLD presents for L neck swelling assessment. See DDX above. See lab considerations above. Discussed plan with patient and daughter at bedside who provided further collateral. Agreeable to I&D and dc home after drainage with close f/u with derm/pcp and possibly surgery for excision.

## 2023-12-01 NOTE — ED ADULT NURSE NOTE - COMFORT/ACCEPTABLE PAIN LEVEL (0-10)
"Subjective:   Charlee Rowe 2

## 2024-02-01 NOTE — PATIENT PROFILE ADULT. - HEALTH/HEALTHCARE ANXIETIES, PROFILE
Assessment/Plan:     Chronic Problems:  Essential hypertension  Stable within parameters continue current meds occasions, encouraged home monitoring    Type 2 diabetes mellitus without complication, without long-term current use of insulin (HCC)  Controlled per last A1c hypoglycemia continue current medications  Lab Results   Component Value Date    HGBA1C 6.4 (H) 09/06/2023       Visit Diagnosis:  Diagnoses and all orders for this visit:    Essential hypertension    Type 2 diabetes mellitus without complication, without long-term current use of insulin (HCC)    Chest wall pain  Comments:  Discussed potentials with patient and family most likely musculoskeletal          Subjective:    Patient ID: Florina Pace is a 80 y.o. female.    Follow-up ER here with daughter  ER visit 29 January 2023  Complaining of left-sided chest pain   history of atrial fibrillation on Coumadin, hypertension, hyperlipidemia, and type 2 diabetes who presented to Er with a chief complaint of left sided chest wall pain. Symptoms began when she bent down to  a box of 6 dozen eggs and have been constant since onset  Discomfort still present reproducible with movement, left lateral beneath bra negative rash lesion noted  Patient feels discomfort related to Fosamax attributed to multiple other issues to onset of treatment with Fosamax  Has decided to discontinue Fosamax will discuss alternatives with Dr. Bose in March  Blood pressure remains stable denies any significant elevations since discharge from ER Continues with Tenoretic  Diabetes denies any hypoglycemia negative polyuria polydipsia  Monitoring blood sugars continues with glipizide 2.5 Actos 15  Continues with Coumadin  Denies any bleeding bruising issues to report          The following portions of the patient's history were reviewed and updated as appropriate: allergies, current medications, past family history, past medical history, past social history, past surgical  "history and problem list.    Review of Systems   Constitutional:  Negative for appetite change, chills, fever and unexpected weight change.   HENT:  Negative for congestion, dental problem, ear pain, hearing loss, postnasal drip, rhinorrhea, sinus pressure, sinus pain, sneezing, sore throat, tinnitus and voice change.    Eyes:  Negative for visual disturbance.   Respiratory:  Negative for apnea, cough, chest tightness and shortness of breath.    Cardiovascular:  Negative for chest pain, palpitations and leg swelling.   Gastrointestinal:  Negative for abdominal pain, blood in stool, constipation, diarrhea, nausea and vomiting.   Endocrine: Negative for cold intolerance, heat intolerance, polydipsia, polyphagia and polyuria.   Genitourinary:  Negative for decreased urine volume, difficulty urinating, dysuria, frequency and hematuria.   Musculoskeletal:  Negative for arthralgias, back pain, gait problem, joint swelling and myalgias.   Skin:  Negative for color change, rash and wound.   Allergic/Immunologic: Negative for environmental allergies and food allergies.   Neurological:  Negative for dizziness, syncope, weakness, light-headedness, numbness and headaches.   Hematological:  Negative for adenopathy. Does not bruise/bleed easily.   Psychiatric/Behavioral:  Negative for sleep disturbance and suicidal ideas. The patient is not nervous/anxious.          /62 (BP Location: Left arm, Patient Position: Sitting, Cuff Size: Standard)   Pulse 72   Temp (!) 97.4 °F (36.3 °C) (Tympanic)   Ht 5' 1\" (1.549 m)   Wt 70.4 kg (155 lb 3.2 oz)   SpO2 96%   BMI 29.32 kg/m²   Social History     Socioeconomic History    Marital status:      Spouse name: Not on file    Number of children: Not on file    Years of education: Not on file    Highest education level: Not on file   Occupational History    Not on file   Tobacco Use    Smoking status: Never    Smokeless tobacco: Never   Vaping Use    Vaping status: Never Used "   Substance and Sexual Activity    Alcohol use: Yes    Drug use: No    Sexual activity: Not on file   Other Topics Concern    Not on file   Social History Narrative    Always uses seat belt    Employed    Lives with spouse      Social Determinants of Health     Financial Resource Strain: Not on file   Food Insecurity: Not on file   Transportation Needs: No Transportation Needs (5/19/2022)    PRAPARE - Transportation     Lack of Transportation (Medical): No     Lack of Transportation (Non-Medical): No   Physical Activity: Not on file   Stress: Not on file   Social Connections: Not on file   Intimate Partner Violence: Not on file   Housing Stability: Unknown (5/19/2022)    Housing Stability Vital Sign     Unable to Pay for Housing in the Last Year: No     Number of Places Lived in the Last Year: Not on file     Unstable Housing in the Last Year: No     Past Medical History:   Diagnosis Date    Carpal tunnel syndrome     Heart murmur      Family History   Problem Relation Age of Onset    Heart attack Father         ARRHYTHMIAS    Coronary artery disease Family     Diabetes Family     Heart attack Family         ARRHYTHMIAS     Past Surgical History:   Procedure Laterality Date    CATARACT EXTRACTION      CHOLECYSTECTOMY      COLONOSCOPY  11/24/2007    EYE SURGERY      KNEE SURGERY      REPLACEMENT TOTAL KNEE Right     TOTAL ABDOMINAL HYSTERECTOMY W/ BILATERAL SALPINGOOPHORECTOMY         Current Outpatient Medications:     alendronate (Fosamax) 70 mg tablet, Take 1 tablet (70 mg total) by mouth every 7 days, Disp: 12 tablet, Rfl: 3    Ascorbic Acid (VITAMIN C) 100 MG tablet, Take 500 mg by mouth daily, Disp: , Rfl:     atenolol-chlorthalidone (TENORETIC) 50-25 mg per tablet, TAKE ONE TABLET BY MOUTH EVERY DAY, Disp: 90 tablet, Rfl: 2    B Complex Vitamins (B COMPLEX 100 PO), Take 1 tablet by mouth daily, Disp: , Rfl:     Blood Glucose Monitoring Suppl (FREESTYLE LITE) RANULFO by Does not apply route 2 (two) times a day Dx  E11.9, Disp: 1 each, Rfl: 0    cyclobenzaprine (FLEXERIL) 10 mg tablet, TAKE ONE TABLET BY MOUTH THREE TIMES A DAY AS NEEDED FOR MUSCLE SPASMS, Disp: 20 tablet, Rfl: 0    dorzolamide (TRUSOPT) 2 % ophthalmic solution, INSTILL 1 DROP INTO THE RIGHT EYE THREE TIMES A DAY, Disp: , Rfl:     FREESTYLE LITE test strip, TEST TWO TIMES A DAY, Disp: 100 each, Rfl: 3    glipiZIDE (GLUCOTROL XL) 2.5 mg 24 hr tablet, Take 1 tablet (2.5 mg total) by mouth daily, Disp: 90 tablet, Rfl: 1    Lancets (freestyle) lancets, USE TO TEST TWICE DAILY, Disp: 100 each, Rfl: 1    levothyroxine 50 mcg tablet, TAKE ONE TABLET BY MOUTH EVERY DAY, Disp: 90 tablet, Rfl: 1    Multiple Vitamin (MULTIVITAMIN) tablet, Take 1 tablet by mouth daily, Disp: , Rfl:     niacin 500 mg tablet, Take by mouth , Disp: , Rfl:     pioglitazone (ACTOS) 15 mg tablet, TAKE ONE TABLET BY MOUTH EVERY DAY, Disp: 90 tablet, Rfl: 1    rosuvastatin (CRESTOR) 10 MG tablet, TAKE ONE TABLET BY MOUTH EVERY DAY, Disp: 90 tablet, Rfl: 3    tolterodine (DETROL LA) 4 mg 24 hr capsule, Take 1 capsule (4 mg total) by mouth daily, Disp: 90 capsule, Rfl: 1    triamcinolone (KENALOG) 0.1 % ointment, Apply topically 2 (two) times a day, Disp: 30 g, Rfl: 0    warfarin (Jantoven) 5 mg tablet, TAKE ONE TO TWO TABLETS BY MOUTH EVERY DAY, Disp: 180 tablet, Rfl: 3    glucose blood (JASWANT CONTOUR TEST) test strip, Check blood sugar twice daily, DX:E11.9, Disp: 200 each, Rfl: 3    No Known Allergies       Lab Review   Admission on 01/29/2024, Discharged on 01/29/2024   Component Date Value    WBC 01/28/2024 11.63 (H)     RBC 01/28/2024 3.80 (L)     Hemoglobin 01/28/2024 11.3 (L)     Hematocrit 01/28/2024 35.1     MCV 01/28/2024 92     MCH 01/28/2024 29.7     MCHC 01/28/2024 32.2     RDW 01/28/2024 14.3     MPV 01/28/2024 9.3     Platelets 01/28/2024 383     nRBC 01/28/2024 0     Neutrophils Relative 01/28/2024 71     Immat GRANS % 01/28/2024 0     Lymphocytes Relative 01/28/2024 19      Monocytes Relative 01/28/2024 7     Eosinophils Relative 01/28/2024 2     Basophils Relative 01/28/2024 1     Neutrophils Absolute 01/28/2024 8.17 (H)     Immature Grans Absolute 01/28/2024 0.04     Lymphocytes Absolute 01/28/2024 2.26     Monocytes Absolute 01/28/2024 0.85     Eosinophils Absolute 01/28/2024 0.25     Basophils Absolute 01/28/2024 0.06     Sodium 01/28/2024 137     Potassium 01/28/2024 3.1 (L)     Chloride 01/28/2024 101     CO2 01/28/2024 28     ANION GAP 01/28/2024 8     BUN 01/28/2024 27 (H)     Creatinine 01/28/2024 0.81     Glucose 01/28/2024 122     Calcium 01/28/2024 11.1 (H)     AST 01/28/2024 22     ALT 01/28/2024 13     Alkaline Phosphatase 01/28/2024 41     Total Protein 01/28/2024 7.4     Albumin 01/28/2024 4.2     Total Bilirubin 01/28/2024 0.33     eGFR 01/28/2024 68     hs TnI 0hr 01/28/2024 8     Ventricular Rate 01/28/2024 68     Atrial Rate 01/28/2024 68     [place] Interval 01/28/2024 220     QRSD Interval 01/28/2024 96     QT Interval 01/28/2024 424     QTC Interval 01/28/2024 450     P Axis 01/28/2024 87     QRS [place] 01/28/2024 61     T Wave [place] 01/28/2024 64     Ventricular Rate 01/28/2024 63     Atrial Rate 01/28/2024 63     [place] Interval 01/28/2024 216     QRSD Interval 01/28/2024 100     QT Interval 01/28/2024 446     QTC Interval 01/28/2024 456     P Axis 01/28/2024 83     QRS [place] 01/28/2024 58     T Wave [place] 01/28/2024 65     hs TnI 2hr 01/29/2024 10     Delta 2hr hsTnI 01/29/2024 2     hs TnI 4hr 01/29/2024 10     Delta 4hr hsTnI 01/29/2024 2     Protime 01/28/2024 24.4 (H)     INR 01/28/2024 2.10 (H)     PTT 01/28/2024 61 (H)    Ancillary Orders on 12/01/2023   Component Date Value    Protime 12/27/2023 26.5 (H)     INR 12/27/2023 2.34 (H)    Ancillary Orders on 10/27/2023   Component Date Value    Protime 10/28/2023 24.8 (H)     INR 10/28/2023 2.14 (H)    Ancillary Orders on 09/29/2023   Component Date Value    Protime 11/30/2023 30.3 (H)     INR 11/30/2023 2.78 (H)     Appointment on 09/06/2023   Component Date Value    WBC 09/06/2023 8.53     RBC 09/06/2023 3.86     Hemoglobin 09/06/2023 11.3 (L)     Hematocrit 09/06/2023 35.1     MCV 09/06/2023 91     MCH 09/06/2023 29.3     MCHC 09/06/2023 32.2     RDW 09/06/2023 14.7     Platelets 09/06/2023 369     MPV 09/06/2023 9.7     Sodium 09/06/2023 136     Potassium 09/06/2023 3.7     Chloride 09/06/2023 100     CO2 09/06/2023 29     ANION GAP 09/06/2023 7     BUN 09/06/2023 29 (H)     Creatinine 09/06/2023 0.80     Glucose, Fasting 09/06/2023 105 (H)     Calcium 09/06/2023 11.9 (H)     AST 09/06/2023 21     ALT 09/06/2023 12     Alkaline Phosphatase 09/06/2023 40     Total Protein 09/06/2023 7.4     Albumin 09/06/2023 4.2     Total Bilirubin 09/06/2023 0.43     eGFR 09/06/2023 69     Cholesterol 09/06/2023 186     Triglycerides 09/06/2023 185 (H)     HDL, Direct 09/06/2023 41 (L)     LDL Calculated 09/06/2023 108 (H)     Non-HDL-Chol (CHOL-HDL) 09/06/2023 145     Hemoglobin A1C 09/06/2023 6.4 (H)     EAG 09/06/2023 137     TSH 3RD GENERATON 09/06/2023 3.551     Creatinine, Ur 09/06/2023 56.1     Albumin,U,Random 09/06/2023 111.3 (H)     Albumin Creat Ratio 09/06/2023 198 (H)    Ancillary Orders on 08/25/2023   Component Date Value    Protime 09/28/2023 24.2 (H)     INR 09/28/2023 2.08 (H)         Imaging: XR chest pa & lateral    Result Date: 1/29/2024  Narrative: CHEST INDICATION:   CP. COMPARISON: 10/16/2023 EXAM PERFORMED/VIEWS:  XR CHEST PA & LATERAL FINDINGS: Heart shadow is mildly enlarged but unchanged from prior exam. Redemonstrated is left-sided pacemaker with unchanged position of electrodes. The lungs are clear.  No pneumothorax or pleural effusion. There are degenerative changes in the shoulders.     Impression: No acute pulmonary disease. Unchanged mild cardiomegaly. Workstation performed: [id number]       Objective:     Physical Exam  Constitutional:       General: She is not in acute distress.     Appearance: She is  "well-developed. She is not ill-appearing or toxic-appearing.   HENT:      Head: Normocephalic and atraumatic.   Cardiovascular:      Rate and Rhythm: Normal rate and regular rhythm.      Heart sounds: Normal heart sounds.   Pulmonary:      Effort: Pulmonary effort is normal.      Breath sounds: Normal breath sounds.   Musculoskeletal:         General: Normal range of motion.      Cervical back: Normal range of motion and neck supple.   Skin:     General: Skin is warm and dry.      Findings: No lesion or rash.   Neurological:      Mental Status: She is alert and oriented to person, place, and time.      Deep Tendon Reflexes: Reflexes are normal and symmetric.   Psychiatric:         Behavior: Behavior normal.         Thought Content: Thought content normal.         Judgment: Judgment normal.           There are no Patient Instructions on file for this visit.    FLIP Nuñez    Portions of the record may have been created with voice recognition software.  Occasional wrong word or \"sound a like\" substitutions may have occurred due to the inherent limitations of voice recognition software.  Read the chart carefully and recognize, using context, where substitutions have occurred.  " none

## 2024-06-05 ENCOUNTER — APPOINTMENT (OUTPATIENT)
Dept: HOME HEALTH SERVICES | Facility: HOME HEALTH | Age: 89
End: 2024-06-05
Payer: MEDICARE

## 2024-06-05 VITALS — HEIGHT: 65 IN | WEIGHT: 130 LBS | BODY MASS INDEX: 21.66 KG/M2

## 2024-06-05 DIAGNOSIS — N18.31 CHRONIC KIDNEY DISEASE, STAGE 3A: ICD-10-CM

## 2024-06-05 DIAGNOSIS — R22.1 LOCALIZED SWELLING, MASS AND LUMP, NECK: ICD-10-CM

## 2024-06-05 DIAGNOSIS — Z71.89 OTHER SPECIFIED COUNSELING: ICD-10-CM

## 2024-06-05 DIAGNOSIS — I25.10 ATHEROSCLEROTIC HEART DISEASE OF NATIVE CORONARY ARTERY W/OUT ANGINA PECTORIS: ICD-10-CM

## 2024-06-05 DIAGNOSIS — N40.0 BENIGN PROSTATIC HYPERPLASIA WITHOUT LOWER URINARY TRACT SYMPMS: ICD-10-CM

## 2024-06-05 DIAGNOSIS — R39.81 FUNCTIONAL URINARY INCONTINENCE: ICD-10-CM

## 2024-06-05 DIAGNOSIS — I73.9 PERIPHERAL VASCULAR DISEASE, UNSPECIFIED: ICD-10-CM

## 2024-06-05 DIAGNOSIS — I10 ESSENTIAL (PRIMARY) HYPERTENSION: ICD-10-CM

## 2024-06-05 DIAGNOSIS — R32 UNSPECIFIED URINARY INCONTINENCE: ICD-10-CM

## 2024-06-05 PROCEDURE — 99344 HOME/RES VST NEW MOD MDM 60: CPT

## 2024-06-11 PROBLEM — N18.31 STAGE 3A CHRONIC KIDNEY DISEASE: Status: ACTIVE | Noted: 2024-06-11

## 2024-06-11 LAB
ALBUMIN SERPL ELPH-MCNC: 3.6 G/DL
ALP BLD-CCNC: 54 U/L
ALT SERPL-CCNC: 6 U/L
ANION GAP SERPL CALC-SCNC: 15 MMOL/L
AST SERPL-CCNC: 13 U/L
BASOPHILS # BLD AUTO: 0.03 K/UL
BASOPHILS NFR BLD AUTO: 0.6 %
BILIRUB SERPL-MCNC: 0.2 MG/DL
BUN SERPL-MCNC: 25 MG/DL
CALCIUM SERPL-MCNC: 9.4 MG/DL
CHLORIDE SERPL-SCNC: 105 MMOL/L
CHOLEST SERPL-MCNC: 146 MG/DL
CO2 SERPL-SCNC: 23 MMOL/L
CREAT SERPL-MCNC: 1.4 MG/DL
EGFR: 48 ML/MIN/1.73M2
EOSINOPHIL # BLD AUTO: 0.14 K/UL
EOSINOPHIL NFR BLD AUTO: 2.7 %
GLUCOSE SERPL-MCNC: 108 MG/DL
HCT VFR BLD CALC: 36.1 %
HDLC SERPL-MCNC: 44 MG/DL
HGB BLD-MCNC: 10.9 G/DL
IMM GRANULOCYTES NFR BLD AUTO: 0.2 %
LDLC SERPL CALC-MCNC: 90 MG/DL
LYMPHOCYTES # BLD AUTO: 1.7 K/UL
LYMPHOCYTES NFR BLD AUTO: 32.9 %
MAN DIFF?: NORMAL
MCHC RBC-ENTMCNC: 27.3 PG
MCHC RBC-ENTMCNC: 30.2 GM/DL
MCV RBC AUTO: 90.5 FL
MONOCYTES # BLD AUTO: 0.62 K/UL
MONOCYTES NFR BLD AUTO: 12 %
NEUTROPHILS # BLD AUTO: 2.66 K/UL
NEUTROPHILS NFR BLD AUTO: 51.6 %
NONHDLC SERPL-MCNC: 101 MG/DL
PLATELET # BLD AUTO: 211 K/UL
POTASSIUM SERPL-SCNC: 4 MMOL/L
PROT SERPL-MCNC: 6.2 G/DL
RBC # BLD: 3.99 M/UL
RBC # FLD: 13.5 %
SODIUM SERPL-SCNC: 143 MMOL/L
TRIGL SERPL-MCNC: 54 MG/DL
WBC # FLD AUTO: 5.16 K/UL

## 2024-06-17 ENCOUNTER — NON-APPOINTMENT (OUTPATIENT)
Age: 89
End: 2024-06-17

## 2024-06-17 ENCOUNTER — TRANSCRIPTION ENCOUNTER (OUTPATIENT)
Age: 89
End: 2024-06-17

## 2024-06-17 DIAGNOSIS — R39.9 UNSPECIFIED SYMPTOMS AND SIGNS INVOLVING THE GENITOURINARY SYSTEM: ICD-10-CM

## 2024-06-17 RX ORDER — SULFAMETHOXAZOLE AND TRIMETHOPRIM 800; 160 MG/1; MG/1
800-160 TABLET ORAL TWICE DAILY
Qty: 14 | Refills: 0 | Status: ACTIVE | COMMUNITY
Start: 2024-06-17 | End: 1900-01-01

## 2024-06-18 ENCOUNTER — LABORATORY RESULT (OUTPATIENT)
Age: 89
End: 2024-06-18

## 2024-06-18 ENCOUNTER — NON-APPOINTMENT (OUTPATIENT)
Age: 89
End: 2024-06-18

## 2024-06-20 PROBLEM — R39.81 URINARY INCONTINENCE DUE TO IMMOBILITY: Status: ACTIVE | Noted: 2024-06-20

## 2024-06-20 PROBLEM — R32 URINARY INCONTINENCE: Status: ACTIVE | Noted: 2024-06-13

## 2024-06-20 PROBLEM — R22.1 NECK MASS: Status: ACTIVE | Noted: 2024-06-05

## 2024-06-20 PROBLEM — Z71.89 ADVANCED CARE PLANNING/COUNSELING DISCUSSION: Status: ACTIVE | Noted: 2024-06-20

## 2024-06-20 PROBLEM — I10 HYPERTENSION: Status: ACTIVE | Noted: 2024-06-05

## 2024-06-20 PROBLEM — N40.0 BPH (BENIGN PROSTATIC HYPERPLASIA): Status: ACTIVE | Noted: 2024-06-20

## 2024-06-20 NOTE — PHYSICAL EXAM
[No Acute Distress] : no acute distress [Normal Voice/Communication] : normal voice communication [PERRL] : pupils equal, round and reactive to light [EOMI] : extra ocular movement intact [Normal TMs] : both tympanic membranes were normal [Supple] : the neck was supple [No Respiratory Distress] : no respiratory distress [Clear to Auscultation] : lungs were clear to auscultation bilaterally [No Accessory Muscle Use] : no accessory muscle use [Normal Rate] : heart rate was normal  [Regular Rhythm] : with a regular rhythm [Normal S1, S2] : normal S1 and S2 [No Murmurs] : no murmurs heard [Non Tender] : non-tender [Soft] : abdomen soft [Not Distended] : not distended [No Joint Swelling] : no joint swelling seen [No Motor Deficits] : the motor exam was normal [No Gross Sensory Deficits] : no gross sensory deficits [Normal Affect] : the affect was normal [de-identified] : laying in bed comfortably [de-identified] : L 2cm nonmobile nontender mass underneath ear. no skin changes or warmth [de-identified] : pedal pulses difficult to palpate due to PAD- no tenderness or skin changes [de-identified] : oriented to self

## 2024-06-20 NOTE — HISTORY OF PRESENT ILLNESS
[FreeTextEntry1] : severe dementia [FreeTextEntry2] : SANDEE WILSON is a 89 year man with pmhx of CAD s/p triple bypass and multiple stents, HTN, BPH, PAD, dementiawho is being seen for initial visit.     Accompanying patient today is daughter and son   Last hospitalization: no hospitalizations in the past year   Geriatric Assessment: -No weight loss >10 lbs in the past 6 months. -No changes in dentition or swallowing reported. 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  -dementia: forgets children -pulls ups. mostly incontinent. -Patient denies any symptoms of depression or anxiety. -Pt needs some to full assistance with ADLs and IADLs. -Gait: needs multiple person assist to walk -Patient's home environment is safe. -Goals of care discussed. MOLST completed. DNR/trial of intubation Code status. -HCP: daughter -HHA: 35 hours a week. needs more hours.  -DME: [facility]    Current concerns:  -needs pull ups. large gloves, chucks, wipes -boil on R ear   Chronic Medical Problems: -PAD: severe. per daughter no intervention rec'd due to comorbidities. on ASA and statin. -CAD: on statin and ASA -CKD stage 3a: chronic. stable.  -HTN: on amlodipine, carvedilol. -BPH: on finasteride and flomax.    Specialists: -Neuro: Dr. Scherer [phone] -Cards: Dr. Gonzalez [phone]

## 2024-06-21 DIAGNOSIS — G47.00 INSOMNIA, UNSPECIFIED: ICD-10-CM

## 2024-06-21 DIAGNOSIS — F32.A DEPRESSION, UNSPECIFIED: ICD-10-CM

## 2024-06-21 LAB
APPEARANCE: ABNORMAL
BILIRUBIN URINE: NEGATIVE
BLOOD URINE: ABNORMAL
COLOR: YELLOW
GLUCOSE QUALITATIVE U: NEGATIVE MG/DL
KETONES URINE: NEGATIVE MG/DL
LEUKOCYTE ESTERASE URINE: ABNORMAL
NITRITE URINE: NEGATIVE
PH URINE: 6.5
PROTEIN URINE: 30 MG/DL
SPECIFIC GRAVITY URINE: 1.02
UROBILINOGEN URINE: 0.2 MG/DL

## 2024-06-23 ENCOUNTER — NON-APPOINTMENT (OUTPATIENT)
Age: 89
End: 2024-06-23

## 2024-06-23 ENCOUNTER — INPATIENT (INPATIENT)
Facility: HOSPITAL | Age: 89
LOS: 3 days | Discharge: HOME | End: 2024-06-27
Attending: HOSPITALIST | Admitting: HOSPITALIST
Payer: MEDICARE

## 2024-06-23 VITALS
SYSTOLIC BLOOD PRESSURE: 153 MMHG | WEIGHT: 149.91 LBS | RESPIRATION RATE: 19 BRPM | TEMPERATURE: 98 F | DIASTOLIC BLOOD PRESSURE: 68 MMHG | HEART RATE: 72 BPM | OXYGEN SATURATION: 97 % | HEIGHT: 70 IN

## 2024-06-23 DIAGNOSIS — Z95.5 PRESENCE OF CORONARY ANGIOPLASTY IMPLANT AND GRAFT: Chronic | ICD-10-CM

## 2024-06-23 DIAGNOSIS — I65.21 OCCLUSION AND STENOSIS OF RIGHT CAROTID ARTERY: Chronic | ICD-10-CM

## 2024-06-23 DIAGNOSIS — Z95.1 PRESENCE OF AORTOCORONARY BYPASS GRAFT: Chronic | ICD-10-CM

## 2024-06-23 LAB
ALBUMIN SERPL ELPH-MCNC: 3.4 G/DL — SIGNIFICANT CHANGE UP (ref 3.3–5)
ALBUMIN SERPL ELPH-MCNC: 4.1 G/DL — SIGNIFICANT CHANGE UP (ref 3.3–5)
ALP SERPL-CCNC: 54 U/L — SIGNIFICANT CHANGE UP (ref 40–120)
ALP SERPL-CCNC: 91 U/L — SIGNIFICANT CHANGE UP (ref 40–120)
ALT FLD-CCNC: 14 U/L — SIGNIFICANT CHANGE UP (ref 12–78)
ALT FLD-CCNC: 18 U/L — SIGNIFICANT CHANGE UP (ref 12–78)
ANION GAP SERPL CALC-SCNC: 13 MMOL/L — SIGNIFICANT CHANGE UP (ref 5–17)
ANION GAP SERPL CALC-SCNC: 5 MMOL/L — SIGNIFICANT CHANGE UP (ref 5–17)
ANISOCYTOSIS BLD QL: SIGNIFICANT CHANGE UP
APPEARANCE UR: ABNORMAL
APTT BLD: 28.3 SEC — SIGNIFICANT CHANGE UP (ref 24.5–35.6)
APTT BLD: 35.2 SEC — SIGNIFICANT CHANGE UP (ref 24.5–35.6)
AST SERPL-CCNC: 14 U/L — LOW (ref 15–37)
AST SERPL-CCNC: 18 U/L — SIGNIFICANT CHANGE UP (ref 15–37)
BACTERIA # UR AUTO: ABNORMAL /HPF
BASE EXCESS BLDV CALC-SCNC: 1 MMOL/L — SIGNIFICANT CHANGE UP (ref -2–3)
BASOPHILS # BLD AUTO: 0 K/UL — SIGNIFICANT CHANGE UP (ref 0–0.2)
BASOPHILS # BLD AUTO: 0.03 K/UL — SIGNIFICANT CHANGE UP (ref 0–0.2)
BASOPHILS NFR BLD AUTO: 0 % — SIGNIFICANT CHANGE UP (ref 0–2)
BASOPHILS NFR BLD AUTO: 0.3 % — SIGNIFICANT CHANGE UP (ref 0–2)
BILIRUB SERPL-MCNC: 0.3 MG/DL — SIGNIFICANT CHANGE UP (ref 0.2–1.2)
BILIRUB SERPL-MCNC: 0.8 MG/DL — SIGNIFICANT CHANGE UP (ref 0.2–1.2)
BILIRUB UR-MCNC: NEGATIVE — SIGNIFICANT CHANGE UP
BLOOD GAS COMMENTS, VENOUS: SIGNIFICANT CHANGE UP
BUN SERPL-MCNC: 39 MG/DL — HIGH (ref 7–23)
BUN SERPL-MCNC: 48 MG/DL — HIGH (ref 7–23)
CALCIUM SERPL-MCNC: 10.1 MG/DL — SIGNIFICANT CHANGE UP (ref 8.5–10.1)
CALCIUM SERPL-MCNC: 8.8 MG/DL — SIGNIFICANT CHANGE UP (ref 8.5–10.1)
CHLORIDE BLDV-SCNC: 102 MMOL/L — SIGNIFICANT CHANGE UP (ref 98–107)
CHLORIDE SERPL-SCNC: 106 MMOL/L — SIGNIFICANT CHANGE UP (ref 96–108)
CHLORIDE SERPL-SCNC: 88 MMOL/L — LOW (ref 96–108)
CO2 BLDV-SCNC: 30 MMOL/L — HIGH (ref 22–26)
CO2 SERPL-SCNC: 25 MMOL/L — SIGNIFICANT CHANGE UP (ref 22–31)
CO2 SERPL-SCNC: 27 MMOL/L — SIGNIFICANT CHANGE UP (ref 22–31)
COLOR SPEC: YELLOW — SIGNIFICANT CHANGE UP
COMMENT - URINE: SIGNIFICANT CHANGE UP
CREAT SERPL-MCNC: 10.9 MG/DL — HIGH (ref 0.5–1.3)
CREAT SERPL-MCNC: 2.76 MG/DL — HIGH (ref 0.5–1.3)
D DIMER BLD IA.RAPID-MCNC: 196 NG/ML DDU — SIGNIFICANT CHANGE UP
D DIMER BLD IA.RAPID-MCNC: 611 NG/ML DDU — HIGH
DACRYOCYTES BLD QL SMEAR: SLIGHT — SIGNIFICANT CHANGE UP
DIFF PNL FLD: ABNORMAL
EGFR: 21 ML/MIN/1.73M2 — LOW
EGFR: 4 ML/MIN/1.73M2 — LOW
EOSINOPHIL # BLD AUTO: 0 K/UL — SIGNIFICANT CHANGE UP (ref 0–0.5)
EOSINOPHIL # BLD AUTO: 0.38 K/UL — SIGNIFICANT CHANGE UP (ref 0–0.5)
EOSINOPHIL NFR BLD AUTO: 0 % — SIGNIFICANT CHANGE UP (ref 0–6)
EOSINOPHIL NFR BLD AUTO: 4 % — SIGNIFICANT CHANGE UP (ref 0–6)
GAS PNL BLDV: 137 MMOL/L — SIGNIFICANT CHANGE UP (ref 136–145)
GAS PNL BLDV: SIGNIFICANT CHANGE UP
GAS PNL BLDV: SIGNIFICANT CHANGE UP
GLUCOSE BLDV-MCNC: 110 MG/DL — HIGH (ref 65–95)
GLUCOSE SERPL-MCNC: 103 MG/DL — HIGH (ref 70–99)
GLUCOSE SERPL-MCNC: 113 MG/DL — HIGH (ref 70–99)
GLUCOSE UR QL: NEGATIVE MG/DL — SIGNIFICANT CHANGE UP
HCO3 BLDV-SCNC: 28 MMOL/L — SIGNIFICANT CHANGE UP (ref 22–28)
HCT VFR BLD CALC: 35.7 % — LOW (ref 39–50)
HCT VFR BLD CALC: 38.1 % — LOW (ref 39–50)
HCT VFR BLDA CALC: 35 % — LOW (ref 37–47)
HGB BLD CALC-MCNC: 11.8 G/DL — LOW (ref 12.6–17.4)
HGB BLD-MCNC: 11.6 G/DL — LOW (ref 13–17)
HGB BLD-MCNC: 11.8 G/DL — LOW (ref 13–17)
HOROWITZ INDEX BLDV+IHG-RTO: SIGNIFICANT CHANGE UP
IMM GRANULOCYTES NFR BLD AUTO: 0.2 % — SIGNIFICANT CHANGE UP (ref 0–0.9)
INR BLD: 0.91 RATIO — SIGNIFICANT CHANGE UP (ref 0.85–1.18)
INR BLD: 0.91 RATIO — SIGNIFICANT CHANGE UP (ref 0.85–1.18)
KETONES UR-MCNC: NEGATIVE MG/DL — SIGNIFICANT CHANGE UP
LACTATE BLDV-MCNC: 2.1 MMOL/L — HIGH (ref 0.56–1.39)
LACTATE SERPL-SCNC: 1 MMOL/L — SIGNIFICANT CHANGE UP (ref 0.7–2)
LACTATE SERPL-SCNC: 1.6 MMOL/L — SIGNIFICANT CHANGE UP (ref 0.7–2)
LACTATE SERPL-SCNC: 2 MMOL/L — SIGNIFICANT CHANGE UP (ref 0.7–2)
LEUKOCYTE ESTERASE UR-ACNC: ABNORMAL
LYMPHOCYTES # BLD AUTO: 0.76 K/UL — LOW (ref 1–3.3)
LYMPHOCYTES # BLD AUTO: 0.94 K/UL — LOW (ref 1–3.3)
LYMPHOCYTES # BLD AUTO: 10.8 % — LOW (ref 13–44)
LYMPHOCYTES # BLD AUTO: 8 % — LOW (ref 13–44)
MACROCYTES BLD QL: SIGNIFICANT CHANGE UP
MAGNESIUM SERPL-MCNC: 2.1 MG/DL — SIGNIFICANT CHANGE UP (ref 1.6–2.6)
MAGNESIUM SERPL-MCNC: 2.4 MG/DL — SIGNIFICANT CHANGE UP (ref 1.6–2.6)
MANUAL SMEAR VERIFICATION: SIGNIFICANT CHANGE UP
MCHC RBC-ENTMCNC: 27.3 PG — SIGNIFICANT CHANGE UP (ref 27–34)
MCHC RBC-ENTMCNC: 31 G/DL — LOW (ref 32–36)
MCHC RBC-ENTMCNC: 32.5 G/DL — SIGNIFICANT CHANGE UP (ref 32–36)
MCHC RBC-ENTMCNC: 33.4 PG — SIGNIFICANT CHANGE UP (ref 27–34)
MCV RBC AUTO: 102.9 FL — HIGH (ref 80–100)
MCV RBC AUTO: 88 FL — SIGNIFICANT CHANGE UP (ref 80–100)
MONOCYTES # BLD AUTO: 0.19 K/UL — SIGNIFICANT CHANGE UP (ref 0–0.9)
MONOCYTES # BLD AUTO: 0.87 K/UL — SIGNIFICANT CHANGE UP (ref 0–0.9)
MONOCYTES NFR BLD AUTO: 10 % — SIGNIFICANT CHANGE UP (ref 2–14)
MONOCYTES NFR BLD AUTO: 2 % — SIGNIFICANT CHANGE UP (ref 2–14)
NEUTROPHILS # BLD AUTO: 6.88 K/UL — SIGNIFICANT CHANGE UP (ref 1.8–7.4)
NEUTROPHILS # BLD AUTO: 8.14 K/UL — HIGH (ref 1.8–7.4)
NEUTROPHILS NFR BLD AUTO: 78.7 % — HIGH (ref 43–77)
NEUTROPHILS NFR BLD AUTO: 86 % — HIGH (ref 43–77)
NITRITE UR-MCNC: NEGATIVE — SIGNIFICANT CHANGE UP
NRBC # BLD: 0 /100 WBCS — SIGNIFICANT CHANGE UP (ref 0–0)
NRBC # BLD: 0 /100 WBCS — SIGNIFICANT CHANGE UP (ref 0–0)
NRBC # BLD: SIGNIFICANT CHANGE UP /100 WBCS (ref 0–0)
NT-PROBNP SERPL-SCNC: 1115 PG/ML — HIGH (ref 0–450)
NT-PROBNP SERPL-SCNC: HIGH PG/ML (ref 0–450)
PCO2 BLDV: 56 MMHG — HIGH (ref 42–55)
PH BLDV: 7.31 — LOW (ref 7.32–7.43)
PH UR: 6 — SIGNIFICANT CHANGE UP (ref 5–8)
PHOSPHATE SERPL-MCNC: 4.6 MG/DL — HIGH (ref 2.5–4.5)
PLAT MORPH BLD: NORMAL — SIGNIFICANT CHANGE UP
PLATELET # BLD AUTO: 114 K/UL — LOW (ref 150–400)
PLATELET # BLD AUTO: 222 K/UL — SIGNIFICANT CHANGE UP (ref 150–400)
PLATELET COUNT - ESTIMATE: ABNORMAL
PO2 BLDV: 27 MMHG — SIGNIFICANT CHANGE UP (ref 25–45)
POIKILOCYTOSIS BLD QL AUTO: SIGNIFICANT CHANGE UP
POTASSIUM BLDV-SCNC: 4.5 MMOL/L — SIGNIFICANT CHANGE UP (ref 3.5–5.1)
POTASSIUM SERPL-MCNC: 4.4 MMOL/L — SIGNIFICANT CHANGE UP (ref 3.5–5.3)
POTASSIUM SERPL-MCNC: 4.4 MMOL/L — SIGNIFICANT CHANGE UP (ref 3.5–5.3)
POTASSIUM SERPL-SCNC: 4.4 MMOL/L — SIGNIFICANT CHANGE UP (ref 3.5–5.3)
POTASSIUM SERPL-SCNC: 4.4 MMOL/L — SIGNIFICANT CHANGE UP (ref 3.5–5.3)
PROT SERPL-MCNC: 7.4 GM/DL — SIGNIFICANT CHANGE UP (ref 6–8.3)
PROT SERPL-MCNC: 8 GM/DL — SIGNIFICANT CHANGE UP (ref 6–8.3)
PROT UR-MCNC: SIGNIFICANT CHANGE UP MG/DL
PROTHROM AB SERPL-ACNC: 11 SEC — SIGNIFICANT CHANGE UP (ref 9.5–13)
PROTHROM AB SERPL-ACNC: 11 SEC — SIGNIFICANT CHANGE UP (ref 9.5–13)
RBC # BLD: 3.47 M/UL — LOW (ref 4.2–5.8)
RBC # BLD: 4.33 M/UL — SIGNIFICANT CHANGE UP (ref 4.2–5.8)
RBC # FLD: 13.3 % — SIGNIFICANT CHANGE UP (ref 10.3–14.5)
RBC # FLD: 14.5 % — SIGNIFICANT CHANGE UP (ref 10.3–14.5)
RBC BLD AUTO: SIGNIFICANT CHANGE UP
RBC CASTS # UR COMP ASSIST: 40 /HPF — HIGH (ref 0–4)
SAO2 % BLDV: 17 % — LOW (ref 94–98)
SODIUM SERPL-SCNC: 126 MMOL/L — LOW (ref 135–145)
SODIUM SERPL-SCNC: 138 MMOL/L — SIGNIFICANT CHANGE UP (ref 135–145)
SP GR SPEC: 1.02 — SIGNIFICANT CHANGE UP (ref 1–1.03)
STOMATOCYTES BLD QL SMEAR: SLIGHT — SIGNIFICANT CHANGE UP
TROPONIN I, HIGH SENSITIVITY RESULT: 25.1 NG/L — SIGNIFICANT CHANGE UP
TROPONIN I, HIGH SENSITIVITY RESULT: 38.5 NG/L — SIGNIFICANT CHANGE UP
TSH SERPL-MCNC: 1.33 UU/ML — SIGNIFICANT CHANGE UP (ref 0.36–3.74)
TSH SERPL-MCNC: 11.6 UU/ML — HIGH (ref 0.36–3.74)
UROBILINOGEN FLD QL: 0.2 MG/DL — SIGNIFICANT CHANGE UP (ref 0.2–1)
WBC # BLD: 8.74 K/UL — SIGNIFICANT CHANGE UP (ref 3.8–10.5)
WBC # BLD: 9.47 K/UL — SIGNIFICANT CHANGE UP (ref 3.8–10.5)
WBC # FLD AUTO: 8.74 K/UL — SIGNIFICANT CHANGE UP (ref 3.8–10.5)
WBC # FLD AUTO: 9.47 K/UL — SIGNIFICANT CHANGE UP (ref 3.8–10.5)
WBC UR QL: >50 /HPF — SIGNIFICANT CHANGE UP (ref 0–5)

## 2024-06-23 PROCEDURE — 70450 CT HEAD/BRAIN W/O DYE: CPT | Mod: 26,MC

## 2024-06-23 PROCEDURE — 74176 CT ABD & PELVIS W/O CONTRAST: CPT | Mod: 26,MC

## 2024-06-23 PROCEDURE — 99223 1ST HOSP IP/OBS HIGH 75: CPT

## 2024-06-23 PROCEDURE — 99285 EMERGENCY DEPT VISIT HI MDM: CPT

## 2024-06-23 PROCEDURE — 93010 ELECTROCARDIOGRAM REPORT: CPT

## 2024-06-23 PROCEDURE — 71045 X-RAY EXAM CHEST 1 VIEW: CPT | Mod: 26

## 2024-06-23 RX ORDER — CEFTRIAXONE SODIUM 500 MG
1000 VIAL (EA) INJECTION EVERY 24 HOURS
Refills: 0 | Status: DISCONTINUED | OUTPATIENT
Start: 2024-06-24 | End: 2024-06-25

## 2024-06-23 RX ORDER — HEPARIN SODIUM 50 [USP'U]/ML
5000 INJECTION, SOLUTION INTRAVENOUS EVERY 12 HOURS
Refills: 0 | Status: DISCONTINUED | OUTPATIENT
Start: 2024-06-23 | End: 2024-06-27

## 2024-06-23 RX ORDER — SODIUM CHLORIDE 0.9 % (FLUSH) 0.9 %
1000 SYRINGE (ML) INJECTION ONCE
Refills: 0 | Status: COMPLETED | OUTPATIENT
Start: 2024-06-23 | End: 2024-06-23

## 2024-06-23 RX ORDER — CARVEDILOL PHOSPHATE 80 MG/1
6.25 CAPSULE, EXTENDED RELEASE ORAL EVERY 12 HOURS
Refills: 0 | Status: DISCONTINUED | OUTPATIENT
Start: 2024-06-23 | End: 2024-06-27

## 2024-06-23 RX ORDER — METOPROLOL TARTRATE 50 MG
100 TABLET ORAL DAILY
Refills: 0 | Status: DISCONTINUED | OUTPATIENT
Start: 2024-06-23 | End: 2024-06-23

## 2024-06-23 RX ORDER — ATORVASTATIN CALCIUM 20 MG/1
80 TABLET, FILM COATED ORAL AT BEDTIME
Refills: 0 | Status: DISCONTINUED | OUTPATIENT
Start: 2024-06-23 | End: 2024-06-23

## 2024-06-23 RX ORDER — OLANZAPINE 2.5 MG/1
5 TABLET, FILM COATED ORAL EVERY 8 HOURS
Refills: 0 | Status: DISCONTINUED | OUTPATIENT
Start: 2024-06-23 | End: 2024-06-27

## 2024-06-23 RX ORDER — TAMSULOSIN HYDROCHLORIDE 0.4 MG/1
0.8 CAPSULE ORAL AT BEDTIME
Refills: 0 | Status: DISCONTINUED | OUTPATIENT
Start: 2024-06-23 | End: 2024-06-27

## 2024-06-23 RX ORDER — OLANZAPINE 2.5 MG/1
5 TABLET, FILM COATED ORAL ONCE
Refills: 0 | Status: COMPLETED | OUTPATIENT
Start: 2024-06-23 | End: 2024-06-23

## 2024-06-23 RX ORDER — PANTOPRAZOLE SODIUM 40 MG/10ML
40 INJECTION, POWDER, FOR SOLUTION INTRAVENOUS
Refills: 0 | Status: DISCONTINUED | OUTPATIENT
Start: 2024-06-23 | End: 2024-06-27

## 2024-06-23 RX ORDER — SODIUM CHLORIDE 0.9 % (FLUSH) 0.9 %
1000 SYRINGE (ML) INJECTION
Refills: 0 | Status: DISCONTINUED | OUTPATIENT
Start: 2024-06-23 | End: 2024-06-27

## 2024-06-23 RX ORDER — MIRTAZAPINE 15 MG/1
7.5 TABLET, FILM COATED ORAL AT BEDTIME
Refills: 0 | Status: DISCONTINUED | OUTPATIENT
Start: 2024-06-23 | End: 2024-06-27

## 2024-06-23 RX ORDER — ASPIRIN 325 MG/1
81 TABLET, FILM COATED ORAL DAILY
Refills: 0 | Status: DISCONTINUED | OUTPATIENT
Start: 2024-06-23 | End: 2024-06-27

## 2024-06-23 RX ORDER — TRAZODONE HYDROCHLORIDE 50 MG/1
100 TABLET, FILM COATED ORAL AT BEDTIME
Refills: 0 | Status: DISCONTINUED | OUTPATIENT
Start: 2024-06-23 | End: 2024-06-27

## 2024-06-23 RX ORDER — CEFEPIME 1 G/1
1000 INJECTION, POWDER, FOR SOLUTION INTRAMUSCULAR; INTRAVENOUS ONCE
Refills: 0 | Status: COMPLETED | OUTPATIENT
Start: 2024-06-23 | End: 2024-06-23

## 2024-06-23 RX ADMIN — Medication 1000 MILLILITER(S): at 16:42

## 2024-06-23 RX ADMIN — TRAZODONE HYDROCHLORIDE 100 MILLIGRAM(S): 50 TABLET, FILM COATED ORAL at 21:46

## 2024-06-23 RX ADMIN — OLANZAPINE 5 MILLIGRAM(S): 2.5 TABLET, FILM COATED ORAL at 23:12

## 2024-06-23 RX ADMIN — TAMSULOSIN HYDROCHLORIDE 0.8 MILLIGRAM(S): 0.4 CAPSULE ORAL at 21:46

## 2024-06-23 RX ADMIN — Medication 100 MILLILITER(S): at 17:51

## 2024-06-23 RX ADMIN — MIRTAZAPINE 7.5 MILLIGRAM(S): 15 TABLET, FILM COATED ORAL at 21:46

## 2024-06-23 RX ADMIN — CEFEPIME 100 MILLIGRAM(S): 1 INJECTION, POWDER, FOR SOLUTION INTRAMUSCULAR; INTRAVENOUS at 15:07

## 2024-06-23 RX ADMIN — Medication 1000 MILLILITER(S): at 15:20

## 2024-06-24 ENCOUNTER — NON-APPOINTMENT (OUTPATIENT)
Age: 89
End: 2024-06-24

## 2024-06-24 LAB — BACTERIA UR CULT: ABNORMAL

## 2024-06-24 PROCEDURE — 99222 1ST HOSP IP/OBS MODERATE 55: CPT

## 2024-06-24 PROCEDURE — 99232 SBSQ HOSP IP/OBS MODERATE 35: CPT

## 2024-06-24 RX ORDER — MIRTAZAPINE 7.5 MG/1
7.5 TABLET, FILM COATED ORAL
Qty: 90 | Refills: 3 | Status: COMPLETED | COMMUNITY
Start: 2024-06-21 | End: 2024-06-24

## 2024-06-24 RX ORDER — PETROLATUM 42 G/100G
1 OINTMENT TOPICAL AT BEDTIME
Refills: 0 | Status: DISCONTINUED | OUTPATIENT
Start: 2024-06-24 | End: 2024-06-27

## 2024-06-24 RX ORDER — LORAZEPAM 0.5 MG
2 TABLET ORAL ONCE
Refills: 0 | Status: DISCONTINUED | OUTPATIENT
Start: 2024-06-24 | End: 2024-06-24

## 2024-06-24 RX ADMIN — Medication 100 MILLIGRAM(S): at 08:47

## 2024-06-24 RX ADMIN — Medication 2 MILLIGRAM(S): at 13:42

## 2024-06-24 RX ADMIN — TRAZODONE HYDROCHLORIDE 100 MILLIGRAM(S): 50 TABLET, FILM COATED ORAL at 22:14

## 2024-06-24 RX ADMIN — HEPARIN SODIUM 5000 UNIT(S): 50 INJECTION, SOLUTION INTRAVENOUS at 18:31

## 2024-06-24 RX ADMIN — PETROLATUM 1 APPLICATION(S): 42 OINTMENT TOPICAL at 23:36

## 2024-06-24 RX ADMIN — OLANZAPINE 5 MILLIGRAM(S): 2.5 TABLET, FILM COATED ORAL at 08:30

## 2024-06-24 RX ADMIN — TAMSULOSIN HYDROCHLORIDE 0.8 MILLIGRAM(S): 0.4 CAPSULE ORAL at 22:14

## 2024-06-24 RX ADMIN — MIRTAZAPINE 7.5 MILLIGRAM(S): 15 TABLET, FILM COATED ORAL at 22:14

## 2024-06-24 RX ADMIN — CARVEDILOL PHOSPHATE 6.25 MILLIGRAM(S): 80 CAPSULE, EXTENDED RELEASE ORAL at 08:47

## 2024-06-24 RX ADMIN — CARVEDILOL PHOSPHATE 6.25 MILLIGRAM(S): 80 CAPSULE, EXTENDED RELEASE ORAL at 18:30

## 2024-06-25 LAB
ALBUMIN SERPL ELPH-MCNC: 2.5 G/DL — LOW (ref 3.3–5)
ALP SERPL-CCNC: 42 U/L — SIGNIFICANT CHANGE UP (ref 40–120)
ALT FLD-CCNC: 8 U/L — LOW (ref 12–78)
ANION GAP SERPL CALC-SCNC: 3 MMOL/L — LOW (ref 5–17)
AST SERPL-CCNC: 18 U/L — SIGNIFICANT CHANGE UP (ref 15–37)
BILIRUB SERPL-MCNC: 0.2 MG/DL — SIGNIFICANT CHANGE UP (ref 0.2–1.2)
BUN SERPL-MCNC: 23 MG/DL — SIGNIFICANT CHANGE UP (ref 7–23)
CALCIUM SERPL-MCNC: 9.1 MG/DL — SIGNIFICANT CHANGE UP (ref 8.5–10.1)
CHLORIDE SERPL-SCNC: 111 MMOL/L — HIGH (ref 96–108)
CO2 SERPL-SCNC: 28 MMOL/L — SIGNIFICANT CHANGE UP (ref 22–31)
CREAT SERPL-MCNC: 1.52 MG/DL — HIGH (ref 0.5–1.3)
EGFR: 44 ML/MIN/1.73M2 — LOW
GLUCOSE SERPL-MCNC: 129 MG/DL — HIGH (ref 70–99)
HCT VFR BLD CALC: 33.8 % — LOW (ref 39–50)
HGB BLD-MCNC: 10.4 G/DL — LOW (ref 13–17)
MCHC RBC-ENTMCNC: 27.5 PG — SIGNIFICANT CHANGE UP (ref 27–34)
MCHC RBC-ENTMCNC: 30.8 G/DL — LOW (ref 32–36)
MCV RBC AUTO: 89.4 FL — SIGNIFICANT CHANGE UP (ref 80–100)
NRBC # BLD: 0 /100 WBCS — SIGNIFICANT CHANGE UP (ref 0–0)
PLATELET # BLD AUTO: 181 K/UL — SIGNIFICANT CHANGE UP (ref 150–400)
POTASSIUM SERPL-MCNC: 4.3 MMOL/L — SIGNIFICANT CHANGE UP (ref 3.5–5.3)
POTASSIUM SERPL-SCNC: 4.3 MMOL/L — SIGNIFICANT CHANGE UP (ref 3.5–5.3)
PROT SERPL-MCNC: 6.2 GM/DL — SIGNIFICANT CHANGE UP (ref 6–8.3)
RBC # BLD: 3.78 M/UL — LOW (ref 4.2–5.8)
RBC # FLD: 13.3 % — SIGNIFICANT CHANGE UP (ref 10.3–14.5)
SODIUM SERPL-SCNC: 142 MMOL/L — SIGNIFICANT CHANGE UP (ref 135–145)
WBC # BLD: 5.32 K/UL — SIGNIFICANT CHANGE UP (ref 3.8–10.5)
WBC # FLD AUTO: 5.32 K/UL — SIGNIFICANT CHANGE UP (ref 3.8–10.5)

## 2024-06-25 PROCEDURE — 99232 SBSQ HOSP IP/OBS MODERATE 35: CPT

## 2024-06-25 RX ORDER — CEFEPIME 1 G/1
1000 INJECTION, POWDER, FOR SOLUTION INTRAMUSCULAR; INTRAVENOUS EVERY 12 HOURS
Refills: 0 | Status: DISCONTINUED | OUTPATIENT
Start: 2024-06-25 | End: 2024-06-27

## 2024-06-25 RX ADMIN — PETROLATUM 1 APPLICATION(S): 42 OINTMENT TOPICAL at 20:57

## 2024-06-25 RX ADMIN — TRAZODONE HYDROCHLORIDE 100 MILLIGRAM(S): 50 TABLET, FILM COATED ORAL at 20:54

## 2024-06-25 RX ADMIN — Medication 5 MILLIGRAM(S): at 11:01

## 2024-06-25 RX ADMIN — TAMSULOSIN HYDROCHLORIDE 0.8 MILLIGRAM(S): 0.4 CAPSULE ORAL at 20:50

## 2024-06-25 RX ADMIN — CARVEDILOL PHOSPHATE 6.25 MILLIGRAM(S): 80 CAPSULE, EXTENDED RELEASE ORAL at 06:10

## 2024-06-25 RX ADMIN — Medication 100 MILLILITER(S): at 07:54

## 2024-06-25 RX ADMIN — PANTOPRAZOLE SODIUM 40 MILLIGRAM(S): 40 INJECTION, POWDER, FOR SOLUTION INTRAVENOUS at 06:10

## 2024-06-25 RX ADMIN — MIRTAZAPINE 7.5 MILLIGRAM(S): 15 TABLET, FILM COATED ORAL at 20:50

## 2024-06-25 RX ADMIN — Medication 100 MILLILITER(S): at 20:54

## 2024-06-25 RX ADMIN — HEPARIN SODIUM 5000 UNIT(S): 50 INJECTION, SOLUTION INTRAVENOUS at 18:02

## 2024-06-25 RX ADMIN — Medication 100 MILLIGRAM(S): at 07:54

## 2024-06-25 RX ADMIN — CARVEDILOL PHOSPHATE 6.25 MILLIGRAM(S): 80 CAPSULE, EXTENDED RELEASE ORAL at 18:02

## 2024-06-25 RX ADMIN — ASPIRIN 81 MILLIGRAM(S): 325 TABLET, FILM COATED ORAL at 11:01

## 2024-06-25 RX ADMIN — HEPARIN SODIUM 5000 UNIT(S): 50 INJECTION, SOLUTION INTRAVENOUS at 06:10

## 2024-06-26 LAB
-  AMIKACIN: SIGNIFICANT CHANGE UP
-  AZTREONAM: SIGNIFICANT CHANGE UP
-  CEFEPIME: SIGNIFICANT CHANGE UP
-  CEFTAZIDIME: SIGNIFICANT CHANGE UP
-  CIPROFLOXACIN: SIGNIFICANT CHANGE UP
-  IMIPENEM: SIGNIFICANT CHANGE UP
-  LEVOFLOXACIN: SIGNIFICANT CHANGE UP
-  MEROPENEM: SIGNIFICANT CHANGE UP
-  PIPERACILLIN/TAZOBACTAM: SIGNIFICANT CHANGE UP
ALBUMIN SERPL ELPH-MCNC: 2.6 G/DL — LOW (ref 3.3–5)
ALP SERPL-CCNC: 57 U/L — SIGNIFICANT CHANGE UP (ref 40–120)
ALT FLD-CCNC: 13 U/L — SIGNIFICANT CHANGE UP (ref 12–78)
ANION GAP SERPL CALC-SCNC: 4 MMOL/L — LOW (ref 5–17)
AST SERPL-CCNC: 22 U/L — SIGNIFICANT CHANGE UP (ref 15–37)
BILIRUB SERPL-MCNC: 0.2 MG/DL — SIGNIFICANT CHANGE UP (ref 0.2–1.2)
BUN SERPL-MCNC: 17 MG/DL — SIGNIFICANT CHANGE UP (ref 7–23)
CALCIUM SERPL-MCNC: 8.7 MG/DL — SIGNIFICANT CHANGE UP (ref 8.5–10.1)
CHLORIDE SERPL-SCNC: 111 MMOL/L — HIGH (ref 96–108)
CO2 SERPL-SCNC: 26 MMOL/L — SIGNIFICANT CHANGE UP (ref 22–31)
CREAT SERPL-MCNC: 1.14 MG/DL — SIGNIFICANT CHANGE UP (ref 0.5–1.3)
CULTURE RESULTS: ABNORMAL
EGFR: 61 ML/MIN/1.73M2 — SIGNIFICANT CHANGE UP
GLUCOSE SERPL-MCNC: 105 MG/DL — HIGH (ref 70–99)
HCT VFR BLD CALC: 33.9 % — LOW (ref 39–50)
HGB BLD-MCNC: 10.7 G/DL — LOW (ref 13–17)
MCHC RBC-ENTMCNC: 27.4 PG — SIGNIFICANT CHANGE UP (ref 27–34)
MCHC RBC-ENTMCNC: 31.6 G/DL — LOW (ref 32–36)
MCV RBC AUTO: 86.9 FL — SIGNIFICANT CHANGE UP (ref 80–100)
METHOD TYPE: SIGNIFICANT CHANGE UP
NRBC # BLD: 0 /100 WBCS — SIGNIFICANT CHANGE UP (ref 0–0)
ORGANISM # SPEC MICROSCOPIC CNT: ABNORMAL
ORGANISM # SPEC MICROSCOPIC CNT: SIGNIFICANT CHANGE UP
PLATELET # BLD AUTO: 185 K/UL — SIGNIFICANT CHANGE UP (ref 150–400)
POTASSIUM SERPL-MCNC: 4.2 MMOL/L — SIGNIFICANT CHANGE UP (ref 3.5–5.3)
POTASSIUM SERPL-SCNC: 4.2 MMOL/L — SIGNIFICANT CHANGE UP (ref 3.5–5.3)
PROT SERPL-MCNC: 6.4 GM/DL — SIGNIFICANT CHANGE UP (ref 6–8.3)
RBC # BLD: 3.9 M/UL — LOW (ref 4.2–5.8)
RBC # FLD: 13.2 % — SIGNIFICANT CHANGE UP (ref 10.3–14.5)
SODIUM SERPL-SCNC: 141 MMOL/L — SIGNIFICANT CHANGE UP (ref 135–145)
SPECIMEN SOURCE: SIGNIFICANT CHANGE UP
WBC # BLD: 6.22 K/UL — SIGNIFICANT CHANGE UP (ref 3.8–10.5)
WBC # FLD AUTO: 6.22 K/UL — SIGNIFICANT CHANGE UP (ref 3.8–10.5)

## 2024-06-26 PROCEDURE — 99232 SBSQ HOSP IP/OBS MODERATE 35: CPT

## 2024-06-26 PROCEDURE — 99222 1ST HOSP IP/OBS MODERATE 55: CPT

## 2024-06-26 RX ADMIN — PANTOPRAZOLE SODIUM 40 MILLIGRAM(S): 40 INJECTION, POWDER, FOR SOLUTION INTRAVENOUS at 06:08

## 2024-06-26 RX ADMIN — ASPIRIN 81 MILLIGRAM(S): 325 TABLET, FILM COATED ORAL at 12:03

## 2024-06-26 RX ADMIN — CARVEDILOL PHOSPHATE 6.25 MILLIGRAM(S): 80 CAPSULE, EXTENDED RELEASE ORAL at 06:07

## 2024-06-26 RX ADMIN — CEFEPIME 100 MILLIGRAM(S): 1 INJECTION, POWDER, FOR SOLUTION INTRAMUSCULAR; INTRAVENOUS at 18:05

## 2024-06-26 RX ADMIN — TAMSULOSIN HYDROCHLORIDE 0.8 MILLIGRAM(S): 0.4 CAPSULE ORAL at 22:27

## 2024-06-26 RX ADMIN — MIRTAZAPINE 7.5 MILLIGRAM(S): 15 TABLET, FILM COATED ORAL at 22:28

## 2024-06-26 RX ADMIN — CARVEDILOL PHOSPHATE 6.25 MILLIGRAM(S): 80 CAPSULE, EXTENDED RELEASE ORAL at 18:05

## 2024-06-26 RX ADMIN — Medication 5 MILLIGRAM(S): at 12:03

## 2024-06-26 RX ADMIN — HEPARIN SODIUM 5000 UNIT(S): 50 INJECTION, SOLUTION INTRAVENOUS at 18:05

## 2024-06-26 RX ADMIN — PETROLATUM 1 APPLICATION(S): 42 OINTMENT TOPICAL at 22:28

## 2024-06-26 RX ADMIN — HEPARIN SODIUM 5000 UNIT(S): 50 INJECTION, SOLUTION INTRAVENOUS at 06:27

## 2024-06-26 RX ADMIN — TRAZODONE HYDROCHLORIDE 100 MILLIGRAM(S): 50 TABLET, FILM COATED ORAL at 22:27

## 2024-06-26 RX ADMIN — CEFEPIME 100 MILLIGRAM(S): 1 INJECTION, POWDER, FOR SOLUTION INTRAMUSCULAR; INTRAVENOUS at 05:07

## 2024-06-27 ENCOUNTER — TRANSCRIPTION ENCOUNTER (OUTPATIENT)
Age: 89
End: 2024-06-27

## 2024-06-27 VITALS
HEART RATE: 79 BPM | OXYGEN SATURATION: 97 % | RESPIRATION RATE: 18 BRPM | SYSTOLIC BLOOD PRESSURE: 158 MMHG | DIASTOLIC BLOOD PRESSURE: 77 MMHG | TEMPERATURE: 97 F

## 2024-06-27 LAB
ANION GAP SERPL CALC-SCNC: 4 MMOL/L — LOW (ref 5–17)
BASOPHILS # BLD AUTO: 0.02 K/UL — SIGNIFICANT CHANGE UP (ref 0–0.2)
BASOPHILS NFR BLD AUTO: 0.4 % — SIGNIFICANT CHANGE UP (ref 0–2)
BUN SERPL-MCNC: 19 MG/DL — SIGNIFICANT CHANGE UP (ref 7–23)
CALCIUM SERPL-MCNC: 8.6 MG/DL — SIGNIFICANT CHANGE UP (ref 8.5–10.1)
CHLORIDE SERPL-SCNC: 111 MMOL/L — HIGH (ref 96–108)
CO2 SERPL-SCNC: 26 MMOL/L — SIGNIFICANT CHANGE UP (ref 22–31)
CREAT SERPL-MCNC: 1.1 MG/DL — SIGNIFICANT CHANGE UP (ref 0.5–1.3)
EGFR: 64 ML/MIN/1.73M2 — SIGNIFICANT CHANGE UP
EOSINOPHIL # BLD AUTO: 0.2 K/UL — SIGNIFICANT CHANGE UP (ref 0–0.5)
EOSINOPHIL NFR BLD AUTO: 3.8 % — SIGNIFICANT CHANGE UP (ref 0–6)
GLUCOSE SERPL-MCNC: 96 MG/DL — SIGNIFICANT CHANGE UP (ref 70–99)
HCT VFR BLD CALC: 33.8 % — LOW (ref 39–50)
HGB BLD-MCNC: 10.4 G/DL — LOW (ref 13–17)
IMM GRANULOCYTES NFR BLD AUTO: 0.2 % — SIGNIFICANT CHANGE UP (ref 0–0.9)
LYMPHOCYTES # BLD AUTO: 1.31 K/UL — SIGNIFICANT CHANGE UP (ref 1–3.3)
LYMPHOCYTES # BLD AUTO: 24.8 % — SIGNIFICANT CHANGE UP (ref 13–44)
MCHC RBC-ENTMCNC: 27.1 PG — SIGNIFICANT CHANGE UP (ref 27–34)
MCHC RBC-ENTMCNC: 30.8 G/DL — LOW (ref 32–36)
MCV RBC AUTO: 88 FL — SIGNIFICANT CHANGE UP (ref 80–100)
MONOCYTES # BLD AUTO: 1.09 K/UL — HIGH (ref 0–0.9)
MONOCYTES NFR BLD AUTO: 20.6 % — HIGH (ref 2–14)
NEUTROPHILS # BLD AUTO: 2.66 K/UL — SIGNIFICANT CHANGE UP (ref 1.8–7.4)
NEUTROPHILS NFR BLD AUTO: 50.2 % — SIGNIFICANT CHANGE UP (ref 43–77)
NRBC # BLD: 0 /100 WBCS — SIGNIFICANT CHANGE UP (ref 0–0)
PLATELET # BLD AUTO: 185 K/UL — SIGNIFICANT CHANGE UP (ref 150–400)
POTASSIUM SERPL-MCNC: 3.8 MMOL/L — SIGNIFICANT CHANGE UP (ref 3.5–5.3)
POTASSIUM SERPL-SCNC: 3.8 MMOL/L — SIGNIFICANT CHANGE UP (ref 3.5–5.3)
RBC # BLD: 3.84 M/UL — LOW (ref 4.2–5.8)
RBC # FLD: 13.2 % — SIGNIFICANT CHANGE UP (ref 10.3–14.5)
SODIUM SERPL-SCNC: 141 MMOL/L — SIGNIFICANT CHANGE UP (ref 135–145)
WBC # BLD: 5.29 K/UL — SIGNIFICANT CHANGE UP (ref 3.8–10.5)
WBC # FLD AUTO: 5.29 K/UL — SIGNIFICANT CHANGE UP (ref 3.8–10.5)

## 2024-06-27 PROCEDURE — 76775 US EXAM ABDO BACK WALL LIM: CPT | Mod: 26

## 2024-06-27 PROCEDURE — 99232 SBSQ HOSP IP/OBS MODERATE 35: CPT

## 2024-06-27 PROCEDURE — 93010 ELECTROCARDIOGRAM REPORT: CPT

## 2024-06-27 PROCEDURE — 99239 HOSP IP/OBS DSCHRG MGMT >30: CPT

## 2024-06-27 RX ORDER — CIPROFLOXACIN HCL 500 MG
1 TABLET ORAL
Qty: 14 | Refills: 0
Start: 2024-06-27 | End: 2024-07-03

## 2024-06-27 RX ORDER — PANTOPRAZOLE SODIUM 40 MG/10ML
1 INJECTION, POWDER, FOR SOLUTION INTRAVENOUS
Qty: 28 | Refills: 0
Start: 2024-06-27 | End: 2024-07-24

## 2024-06-27 RX ORDER — TAMSULOSIN HYDROCHLORIDE 0.4 MG/1
1 CAPSULE ORAL
Qty: 60 | Refills: 0 | DISCHARGE
Start: 2024-06-27 | End: 2024-07-26

## 2024-06-27 RX ORDER — TAMSULOSIN HYDROCHLORIDE 0.4 MG/1
2 CAPSULE ORAL
Qty: 60 | Refills: 0
Start: 2024-06-27 | End: 2024-07-26

## 2024-06-27 RX ADMIN — Medication 100 MILLILITER(S): at 11:03

## 2024-06-27 RX ADMIN — CEFEPIME 100 MILLIGRAM(S): 1 INJECTION, POWDER, FOR SOLUTION INTRAMUSCULAR; INTRAVENOUS at 05:56

## 2024-06-27 RX ADMIN — PANTOPRAZOLE SODIUM 40 MILLIGRAM(S): 40 INJECTION, POWDER, FOR SOLUTION INTRAVENOUS at 05:56

## 2024-06-27 RX ADMIN — HEPARIN SODIUM 5000 UNIT(S): 50 INJECTION, SOLUTION INTRAVENOUS at 05:56

## 2024-06-27 RX ADMIN — ASPIRIN 81 MILLIGRAM(S): 325 TABLET, FILM COATED ORAL at 11:03

## 2024-06-27 RX ADMIN — CARVEDILOL PHOSPHATE 6.25 MILLIGRAM(S): 80 CAPSULE, EXTENDED RELEASE ORAL at 17:11

## 2024-06-27 RX ADMIN — Medication 5 MILLIGRAM(S): at 11:03

## 2024-06-28 ENCOUNTER — APPOINTMENT (OUTPATIENT)
Dept: HOME HEALTH SERVICES | Facility: HOME HEALTH | Age: 89
End: 2024-06-28

## 2024-06-28 VITALS
SYSTOLIC BLOOD PRESSURE: 150 MMHG | DIASTOLIC BLOOD PRESSURE: 90 MMHG | OXYGEN SATURATION: 98 % | RESPIRATION RATE: 16 BRPM | TEMPERATURE: 98.6 F | HEART RATE: 77 BPM

## 2024-06-28 LAB
CULTURE RESULTS: SIGNIFICANT CHANGE UP
CULTURE RESULTS: SIGNIFICANT CHANGE UP
SPECIMEN SOURCE: SIGNIFICANT CHANGE UP
SPECIMEN SOURCE: SIGNIFICANT CHANGE UP

## 2024-07-01 ENCOUNTER — APPOINTMENT (OUTPATIENT)
Dept: HOME HEALTH SERVICES | Facility: HOME HEALTH | Age: 89
End: 2024-07-01
Payer: MEDICARE

## 2024-07-01 DIAGNOSIS — N18.31 CHRONIC KIDNEY DISEASE, STAGE 3A: ICD-10-CM

## 2024-07-01 DIAGNOSIS — N40.0 BENIGN PROSTATIC HYPERPLASIA WITHOUT LOWER URINARY TRACT SYMPMS: ICD-10-CM

## 2024-07-01 DIAGNOSIS — R22.1 LOCALIZED SWELLING, MASS AND LUMP, NECK: ICD-10-CM

## 2024-07-01 DIAGNOSIS — G89.29 OTHER CHRONIC PAIN: ICD-10-CM

## 2024-07-01 DIAGNOSIS — I73.9 PERIPHERAL VASCULAR DISEASE, UNSPECIFIED: ICD-10-CM

## 2024-07-01 DIAGNOSIS — I10 ESSENTIAL (PRIMARY) HYPERTENSION: ICD-10-CM

## 2024-07-01 PROCEDURE — 99349 HOME/RES VST EST MOD MDM 40: CPT

## 2024-07-01 RX ORDER — OXYCODONE 5 MG/1
5 TABLET ORAL
Qty: 60 | Refills: 0 | Status: ACTIVE | COMMUNITY
Start: 2024-07-01 | End: 1900-01-01

## 2024-07-05 DIAGNOSIS — G93.41 METABOLIC ENCEPHALOPATHY: ICD-10-CM

## 2024-07-05 DIAGNOSIS — N13.9 OBSTRUCTIVE AND REFLUX UROPATHY, UNSPECIFIED: ICD-10-CM

## 2024-07-05 DIAGNOSIS — F02.80 DEMENTIA IN OTHER DISEASES CLASSIFIED ELSEWHERE, UNSPECIFIED SEVERITY, WITHOUT BEHAVIORAL DISTURBANCE, PSYCHOTIC DISTURBANCE, MOOD DISTURBANCE, AND ANXIETY: ICD-10-CM

## 2024-07-05 DIAGNOSIS — N13.30 UNSPECIFIED HYDRONEPHROSIS: ICD-10-CM

## 2024-07-05 DIAGNOSIS — E78.5 HYPERLIPIDEMIA, UNSPECIFIED: ICD-10-CM

## 2024-07-05 DIAGNOSIS — G30.9 ALZHEIMER'S DISEASE, UNSPECIFIED: ICD-10-CM

## 2024-07-05 DIAGNOSIS — I25.10 ATHEROSCLEROTIC HEART DISEASE OF NATIVE CORONARY ARTERY WITHOUT ANGINA PECTORIS: ICD-10-CM

## 2024-07-05 DIAGNOSIS — B96.5 PSEUDOMONAS (AERUGINOSA) (MALLEI) (PSEUDOMALLEI) AS THE CAUSE OF DISEASES CLASSIFIED ELSEWHERE: ICD-10-CM

## 2024-07-05 DIAGNOSIS — E44.0 MODERATE PROTEIN-CALORIE MALNUTRITION: ICD-10-CM

## 2024-07-05 DIAGNOSIS — N39.0 URINARY TRACT INFECTION, SITE NOT SPECIFIED: ICD-10-CM

## 2024-07-05 DIAGNOSIS — L02.11 CUTANEOUS ABSCESS OF NECK: ICD-10-CM

## 2024-07-05 DIAGNOSIS — I10 ESSENTIAL (PRIMARY) HYPERTENSION: ICD-10-CM

## 2024-07-05 DIAGNOSIS — N40.0 BENIGN PROSTATIC HYPERPLASIA WITHOUT LOWER URINARY TRACT SYMPTOMS: ICD-10-CM

## 2024-07-05 DIAGNOSIS — G20.A1 PARKINSON'S DISEASE WITHOUT DYSKINESIA, WITHOUT MENTION OF FLUCTUATIONS: ICD-10-CM

## 2024-07-05 DIAGNOSIS — N32.0 BLADDER-NECK OBSTRUCTION: ICD-10-CM

## 2024-07-05 DIAGNOSIS — N17.9 ACUTE KIDNEY FAILURE, UNSPECIFIED: ICD-10-CM

## 2024-07-05 DIAGNOSIS — R41.82 ALTERED MENTAL STATUS, UNSPECIFIED: ICD-10-CM

## 2024-07-05 DIAGNOSIS — Z95.1 PRESENCE OF AORTOCORONARY BYPASS GRAFT: ICD-10-CM

## 2024-07-08 ENCOUNTER — NON-APPOINTMENT (OUTPATIENT)
Age: 89
End: 2024-07-08

## 2024-07-09 VITALS
RESPIRATION RATE: 16 BRPM | BODY MASS INDEX: 21.66 KG/M2 | HEIGHT: 65 IN | OXYGEN SATURATION: 99 % | WEIGHT: 130 LBS | SYSTOLIC BLOOD PRESSURE: 134 MMHG | HEART RATE: 80 BPM | DIASTOLIC BLOOD PRESSURE: 70 MMHG

## 2024-07-15 ENCOUNTER — APPOINTMENT (OUTPATIENT)
Dept: UROLOGY | Facility: CLINIC | Age: 89
End: 2024-07-15
Payer: MEDICARE

## 2024-07-15 VITALS — DIASTOLIC BLOOD PRESSURE: 68 MMHG | HEART RATE: 76 BPM | OXYGEN SATURATION: 97 % | SYSTOLIC BLOOD PRESSURE: 113 MMHG

## 2024-07-15 DIAGNOSIS — Z00.00 ENCOUNTER FOR GENERAL ADULT MEDICAL EXAMINATION W/OUT ABNORMAL FINDINGS: ICD-10-CM

## 2024-07-15 DIAGNOSIS — Z80.3 FAMILY HISTORY OF MALIGNANT NEOPLASM OF BREAST: ICD-10-CM

## 2024-07-15 PROCEDURE — 99203 OFFICE O/P NEW LOW 30 MIN: CPT

## 2024-07-16 LAB
ALBUMIN SERPL ELPH-MCNC: 4 G/DL
ALP BLD-CCNC: 63 U/L
ALT SERPL-CCNC: 12 U/L
ANION GAP SERPL CALC-SCNC: 11 MMOL/L
APTT BLD: 24.1 SEC
AST SERPL-CCNC: 17 U/L
BASOPHILS # BLD AUTO: 0.04 K/UL
BASOPHILS NFR BLD AUTO: 0.7 %
BILIRUB SERPL-MCNC: 0.2 MG/DL
BUN SERPL-MCNC: 30 MG/DL
CALCIUM SERPL-MCNC: 9.7 MG/DL
CHLORIDE SERPL-SCNC: 103 MMOL/L
CO2 SERPL-SCNC: 28 MMOL/L
CREAT SERPL-MCNC: 1.48 MG/DL
EGFR: 45 ML/MIN/1.73M2
EOSINOPHIL # BLD AUTO: 0.21 K/UL
EOSINOPHIL NFR BLD AUTO: 3.5 %
GLUCOSE SERPL-MCNC: 122 MG/DL
HCT VFR BLD CALC: 41.9 %
HGB BLD-MCNC: 12 G/DL
IMM GRANULOCYTES NFR BLD AUTO: 0.2 %
INR PPP: 0.95 RATIO
LYMPHOCYTES # BLD AUTO: 1.39 K/UL
LYMPHOCYTES NFR BLD AUTO: 23.2 %
MAN DIFF?: NORMAL
MCHC RBC-ENTMCNC: 26.7 PG
MCHC RBC-ENTMCNC: 28.6 GM/DL
MCV RBC AUTO: 93.3 FL
MONOCYTES # BLD AUTO: 0.53 K/UL
MONOCYTES NFR BLD AUTO: 8.8 %
NEUTROPHILS # BLD AUTO: 3.81 K/UL
NEUTROPHILS NFR BLD AUTO: 63.6 %
PLATELET # BLD AUTO: 332 K/UL
POTASSIUM SERPL-SCNC: 4.1 MMOL/L
PROT SERPL-MCNC: 7.3 G/DL
PT BLD: 10.7 SEC
RBC # BLD: 4.49 M/UL
RBC # FLD: 13.9 %
SODIUM SERPL-SCNC: 142 MMOL/L
WBC # FLD AUTO: 5.99 K/UL

## 2024-07-30 ENCOUNTER — NON-APPOINTMENT (OUTPATIENT)
Age: 89
End: 2024-07-30

## 2024-08-02 ENCOUNTER — APPOINTMENT (OUTPATIENT)
Dept: UROLOGY | Facility: CLINIC | Age: 89
End: 2024-08-02
Payer: MEDICARE

## 2024-08-02 VITALS
BODY MASS INDEX: 21.66 KG/M2 | OXYGEN SATURATION: 97 % | RESPIRATION RATE: 16 BRPM | HEART RATE: 67 BPM | WEIGHT: 130 LBS | HEIGHT: 65 IN | SYSTOLIC BLOOD PRESSURE: 166 MMHG | TEMPERATURE: 98 F | DIASTOLIC BLOOD PRESSURE: 78 MMHG

## 2024-08-02 DIAGNOSIS — Z93.59 OTHER CYSTOSTOMY STATUS: ICD-10-CM

## 2024-08-02 PROCEDURE — 51705 CHANGE OF BLADDER TUBE: CPT

## 2024-08-02 RX ORDER — SULFAMETHOXAZOLE AND TRIMETHOPRIM 800; 160 MG/1; MG/1
800-160 TABLET ORAL
Refills: 0 | Status: COMPLETED | OUTPATIENT
Start: 2024-08-02

## 2024-08-26 ENCOUNTER — APPOINTMENT (OUTPATIENT)
Dept: HOME HEALTH SERVICES | Facility: HOME HEALTH | Age: 89
End: 2024-08-26
Payer: MEDICARE

## 2024-08-26 DIAGNOSIS — R22.1 LOCALIZED SWELLING, MASS AND LUMP, NECK: ICD-10-CM

## 2024-08-26 DIAGNOSIS — I10 ESSENTIAL (PRIMARY) HYPERTENSION: ICD-10-CM

## 2024-08-26 DIAGNOSIS — I73.9 PERIPHERAL VASCULAR DISEASE, UNSPECIFIED: ICD-10-CM

## 2024-08-26 DIAGNOSIS — K60.2 ANAL FISSURE, UNSPECIFIED: ICD-10-CM

## 2024-08-26 DIAGNOSIS — N40.0 BENIGN PROSTATIC HYPERPLASIA WITHOUT LOWER URINARY TRACT SYMPMS: ICD-10-CM

## 2024-08-26 PROCEDURE — 99349 HOME/RES VST EST MOD MDM 40: CPT

## 2024-08-27 ENCOUNTER — APPOINTMENT (OUTPATIENT)
Dept: UROLOGY | Facility: CLINIC | Age: 89
End: 2024-08-27
Payer: MEDICARE

## 2024-08-27 VITALS
HEART RATE: 76 BPM | WEIGHT: 130 LBS | BODY MASS INDEX: 21.66 KG/M2 | RESPIRATION RATE: 16 BRPM | SYSTOLIC BLOOD PRESSURE: 128 MMHG | OXYGEN SATURATION: 97 % | HEIGHT: 65 IN | DIASTOLIC BLOOD PRESSURE: 67 MMHG | TEMPERATURE: 98 F

## 2024-08-27 DIAGNOSIS — Z93.59 OTHER CYSTOSTOMY STATUS: ICD-10-CM

## 2024-08-27 PROCEDURE — 51705 CHANGE OF BLADDER TUBE: CPT

## 2024-08-28 RX ORDER — BACITRACIN ZINC 500 [USP'U]/G
500 OINTMENT TOPICAL 3 TIMES DAILY
Qty: 1 | Refills: 0 | Status: ACTIVE | COMMUNITY
Start: 2024-08-26 | End: 1900-01-01

## 2024-09-02 VITALS
TEMPERATURE: 98.2 F | OXYGEN SATURATION: 96 % | WEIGHT: 130 LBS | RESPIRATION RATE: 16 BRPM | HEIGHT: 65 IN | BODY MASS INDEX: 21.66 KG/M2 | SYSTOLIC BLOOD PRESSURE: 110 MMHG | HEART RATE: 82 BPM | DIASTOLIC BLOOD PRESSURE: 72 MMHG

## 2024-09-02 PROBLEM — K60.2 ANAL FISSURE: Status: ACTIVE | Noted: 2024-08-26

## 2024-09-02 NOTE — REASON FOR VISIT
[Follow-Up] : a follow-up visit [FreeTextEntry1] : CAD, PAD, CKD stage 3a, urinary retention with SPC

## 2024-09-02 NOTE — PHYSICAL EXAM
[No Acute Distress] : no acute distress [Normal Voice/Communication] : normal voice communication [PERRL] : pupils equal, round and reactive to light [EOMI] : extra ocular movement intact [Normal TMs] : both tympanic membranes were normal [Supple] : the neck was supple [No Respiratory Distress] : no respiratory distress [Clear to Auscultation] : lungs were clear to auscultation bilaterally [No Accessory Muscle Use] : no accessory muscle use [Normal Rate] : heart rate was normal  [Regular Rhythm] : with a regular rhythm [Normal S1, S2] : normal S1 and S2 [No Murmurs] : no murmurs heard [Non Tender] : non-tender [Soft] : abdomen soft [Not Distended] : not distended [No Joint Swelling] : no joint swelling seen [No Motor Deficits] : the motor exam was normal [No Gross Sensory Deficits] : no gross sensory deficits [Normal Affect] : the affect was normal [de-identified] : laying in bed comfortably [de-identified] : L nontender mass with no fluctuance or skin changes [de-identified] : pedal pulses difficult to palpate due to PAD- no tenderness or skin changes [de-identified] : carter in place [de-identified] : oriented to self

## 2024-09-02 NOTE — HISTORY OF PRESENT ILLNESS
[Family Member] : family member [FreeTextEntry1] : severe dementia [FreeTextEntry2] : SANDEE WILSON is a 89 year man with pmhx of CAD s/p triple bypass and multiple stents, HTN, BPH, PAD, dementia who is being seen for follow up.     Accompanying patient today is daughter and son   Current concerns: -had an episode of constipation two weeks ago. daughter gave prune juice once. now having bowel movement 4x a day. formed stool initially, but now watery. no blood in stool. abdominal xr.  -VNS came today for nurse nazia. unsure if it is for PT -would like bp cuff  Last hospitalization: 6/2024 for urinary retention/UTI   Geriatric Assessment: -No weight loss >10 lbs in the past 6 months. -No changes in dentition or swallowing reported. 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  -dementia: forgets children -pulls ups. mostly incontinent. -Patient denies any symptoms of depression or anxiety. -Pt needs some to full assistance with ADLs and IADLs. -Gait: needs multiple person assist to walk -Patient's home environment is safe. -Goals of care discussed. MOLST completed. DNR/trial of intubation Code status. -HCP: daughter -HHA: 49 hours a week. needs more hours.  -DME: [facility]   Chronic Medical Problems: -PAD: severe. per daughter no intervention rec'd due to comorbidities. on ASA and statin. -CAD: on statin and ASA -CKD stage 3a: chronic. stable.  -HTN: on amlodipine, carvedilol. -BPH: on finasteride and flomax.    Specialists: -Neuro: Dr. Scherer [phone] -Cards: Dr. Gonzalez [phone] -urology: Yo

## 2024-09-02 NOTE — HISTORY OF PRESENT ILLNESS
[Family Member] : family member [FreeTextEntry1] : severe dementia [FreeTextEntry2] : SANDEE WILSON is a 89 year man with pmhx of CAD s/p triple bypass and multiple stents, HTN, BPH, PAD, dementia who is being seen for follow up.     Accompanying patient today is daughter and son   Current concerns: -had an episode of constipation two weeks ago. daughter gave prune juice once. now having bowel movement 4x a day. formed stool initially, but now watery. no blood in stool. abdominal xr.  -VNS came today for nurse nazia. unsure if it is for PT -would like bp cuff  Last hospitalization: 6/2024 for urinary retention/UTI   Geriatric Assessment: -No weight loss >10 lbs in the past 6 months. -No changes in dentition or swallowing reported. 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  -dementia: forgets children -pulls ups. mostly incontinent. -Patient denies any symptoms of depression or anxiety. -Pt needs some to full assistance with ADLs and IADLs. -Gait: needs multiple person assist to walk -Patient's home environment is safe. -Goals of care discussed. MOLST completed. DNR/trial of intubation Code status. -HCP: daughter -HHA: 49 hours a week. needs more hours.  -DME: commode   Chronic Medical Problems: -PAD: severe. per daughter no intervention rec'd due to comorbidities. on ASA and statin. -CAD: on statin and ASA -CKD stage 3a: chronic. stable.  -HTN: on amlodipine, carvedilol. -BPH: on finasteride and flomax.    Specialists: -Neuro: Dr. Scherer [phone] -Cards: Dr. Gonzalez [phone] -urology: Yo

## 2024-09-02 NOTE — PHYSICAL EXAM
[No Acute Distress] : no acute distress [Normal Voice/Communication] : normal voice communication [PERRL] : pupils equal, round and reactive to light [EOMI] : extra ocular movement intact [Normal TMs] : both tympanic membranes were normal [Supple] : the neck was supple [No Respiratory Distress] : no respiratory distress [Clear to Auscultation] : lungs were clear to auscultation bilaterally [No Accessory Muscle Use] : no accessory muscle use [Normal Rate] : heart rate was normal  [Regular Rhythm] : with a regular rhythm [Normal S1, S2] : normal S1 and S2 [No Murmurs] : no murmurs heard [Non Tender] : non-tender [Soft] : abdomen soft [Not Distended] : not distended [No Joint Swelling] : no joint swelling seen [No Motor Deficits] : the motor exam was normal [No Gross Sensory Deficits] : no gross sensory deficits [Normal Affect] : the affect was normal [de-identified] : laying in bed comfortably [de-identified] : L nontender mass with no fluctuance or skin changes [de-identified] : pedal pulses difficult to palpate due to PAD- no tenderness or skin changes [de-identified] : carter in place [de-identified] : oriented to self

## 2024-10-03 NOTE — DISCHARGE NOTE ADULT - REASON FOR REFUSAL (REFER PATIENT TO HEALTHCARE PROVIDER FOR FOLLOW-UP):
Detail Level: Generalized
Detail Level: Detailed
Topical Antifungal Recommendations: Use econazole cream twice daily for at least 14 days to both feet from ankles down, including webspaces.
pt to follow up with PCP

## 2024-10-11 ENCOUNTER — APPOINTMENT (OUTPATIENT)
Dept: HOME HEALTH SERVICES | Facility: HOME HEALTH | Age: 89
End: 2024-10-11

## 2024-10-11 VITALS
HEART RATE: 78 BPM | DIASTOLIC BLOOD PRESSURE: 70 MMHG | SYSTOLIC BLOOD PRESSURE: 115 MMHG | TEMPERATURE: 98.1 F | RESPIRATION RATE: 16 BRPM | OXYGEN SATURATION: 98 %

## 2024-10-11 VITALS
OXYGEN SATURATION: 97 % | HEART RATE: 68 BPM | TEMPERATURE: 98 F | SYSTOLIC BLOOD PRESSURE: 122 MMHG | DIASTOLIC BLOOD PRESSURE: 80 MMHG | RESPIRATION RATE: 16 BRPM

## 2024-10-15 ENCOUNTER — NON-APPOINTMENT (OUTPATIENT)
Age: 89
End: 2024-10-15

## 2024-10-15 ENCOUNTER — LABORATORY RESULT (OUTPATIENT)
Age: 89
End: 2024-10-15

## 2024-10-15 ENCOUNTER — TRANSCRIPTION ENCOUNTER (OUTPATIENT)
Age: 89
End: 2024-10-15

## 2024-10-15 DIAGNOSIS — R19.7 DIARRHEA, UNSPECIFIED: ICD-10-CM

## 2024-10-20 ENCOUNTER — EMERGENCY (EMERGENCY)
Facility: HOSPITAL | Age: 89
LOS: 0 days | Discharge: HOME | End: 2024-10-21
Attending: STUDENT IN AN ORGANIZED HEALTH CARE EDUCATION/TRAINING PROGRAM
Payer: MEDICARE

## 2024-10-20 VITALS
OXYGEN SATURATION: 96 % | HEIGHT: 66 IN | RESPIRATION RATE: 14 BRPM | HEART RATE: 79 BPM | DIASTOLIC BLOOD PRESSURE: 109 MMHG | TEMPERATURE: 98 F | WEIGHT: 160.06 LBS | SYSTOLIC BLOOD PRESSURE: 151 MMHG

## 2024-10-20 DIAGNOSIS — R19.5 OTHER FECAL ABNORMALITIES: ICD-10-CM

## 2024-10-20 DIAGNOSIS — I73.9 PERIPHERAL VASCULAR DISEASE, UNSPECIFIED: ICD-10-CM

## 2024-10-20 DIAGNOSIS — N40.0 BENIGN PROSTATIC HYPERPLASIA WITHOUT LOWER URINARY TRACT SYMPTOMS: ICD-10-CM

## 2024-10-20 DIAGNOSIS — M54.2 CERVICALGIA: ICD-10-CM

## 2024-10-20 DIAGNOSIS — I10 ESSENTIAL (PRIMARY) HYPERTENSION: ICD-10-CM

## 2024-10-20 DIAGNOSIS — I25.10 ATHEROSCLEROTIC HEART DISEASE OF NATIVE CORONARY ARTERY WITHOUT ANGINA PECTORIS: ICD-10-CM

## 2024-10-20 DIAGNOSIS — Z95.5 PRESENCE OF CORONARY ANGIOPLASTY IMPLANT AND GRAFT: ICD-10-CM

## 2024-10-20 DIAGNOSIS — F03.90 UNSPECIFIED DEMENTIA, UNSPECIFIED SEVERITY, WITHOUT BEHAVIORAL DISTURBANCE, PSYCHOTIC DISTURBANCE, MOOD DISTURBANCE, AND ANXIETY: ICD-10-CM

## 2024-10-20 DIAGNOSIS — M54.9 DORSALGIA, UNSPECIFIED: ICD-10-CM

## 2024-10-20 DIAGNOSIS — G89.29 OTHER CHRONIC PAIN: ICD-10-CM

## 2024-10-20 DIAGNOSIS — M79.606 PAIN IN LEG, UNSPECIFIED: ICD-10-CM

## 2024-10-20 LAB
ALBUMIN SERPL ELPH-MCNC: 2.7 G/DL — LOW (ref 3.3–5)
ALP SERPL-CCNC: 58 U/L — SIGNIFICANT CHANGE UP (ref 40–120)
ALT FLD-CCNC: 11 U/L — LOW (ref 12–78)
ANION GAP SERPL CALC-SCNC: 3 MMOL/L — LOW (ref 5–17)
APTT BLD: 31.4 SEC — SIGNIFICANT CHANGE UP (ref 24.5–35.6)
AST SERPL-CCNC: 14 U/L — LOW (ref 15–37)
BASOPHILS # BLD AUTO: 0.04 K/UL — SIGNIFICANT CHANGE UP (ref 0–0.2)
BASOPHILS NFR BLD AUTO: 0.6 % — SIGNIFICANT CHANGE UP (ref 0–2)
BILIRUB SERPL-MCNC: 0.3 MG/DL — SIGNIFICANT CHANGE UP (ref 0.2–1.2)
BLD GP AB SCN SERPL QL: SIGNIFICANT CHANGE UP
BUN SERPL-MCNC: 16 MG/DL — SIGNIFICANT CHANGE UP (ref 7–23)
CALCIUM SERPL-MCNC: 9.6 MG/DL — SIGNIFICANT CHANGE UP (ref 8.5–10.1)
CHLORIDE SERPL-SCNC: 106 MMOL/L — SIGNIFICANT CHANGE UP (ref 96–108)
CO2 SERPL-SCNC: 30 MMOL/L — SIGNIFICANT CHANGE UP (ref 22–31)
CREAT SERPL-MCNC: 1.17 MG/DL — SIGNIFICANT CHANGE UP (ref 0.5–1.3)
EGFR: 60 ML/MIN/1.73M2 — SIGNIFICANT CHANGE UP
EOSINOPHIL # BLD AUTO: 0.17 K/UL — SIGNIFICANT CHANGE UP (ref 0–0.5)
EOSINOPHIL NFR BLD AUTO: 2.7 % — SIGNIFICANT CHANGE UP (ref 0–6)
GLUCOSE SERPL-MCNC: 86 MG/DL — SIGNIFICANT CHANGE UP (ref 70–99)
HCT VFR BLD CALC: 38.5 % — LOW (ref 39–50)
HGB BLD-MCNC: 11.7 G/DL — LOW (ref 13–17)
IMM GRANULOCYTES NFR BLD AUTO: 0.5 % — SIGNIFICANT CHANGE UP (ref 0–0.9)
INR BLD: 1.04 RATIO — SIGNIFICANT CHANGE UP (ref 0.85–1.16)
LYMPHOCYTES # BLD AUTO: 1.71 K/UL — SIGNIFICANT CHANGE UP (ref 1–3.3)
LYMPHOCYTES # BLD AUTO: 26.7 % — SIGNIFICANT CHANGE UP (ref 13–44)
MCHC RBC-ENTMCNC: 26.7 PG — LOW (ref 27–34)
MCHC RBC-ENTMCNC: 30.4 G/DL — LOW (ref 32–36)
MCV RBC AUTO: 87.7 FL — SIGNIFICANT CHANGE UP (ref 80–100)
MONOCYTES # BLD AUTO: 1.06 K/UL — HIGH (ref 0–0.9)
MONOCYTES NFR BLD AUTO: 16.6 % — HIGH (ref 2–14)
NEUTROPHILS # BLD AUTO: 3.39 K/UL — SIGNIFICANT CHANGE UP (ref 1.8–7.4)
NEUTROPHILS NFR BLD AUTO: 52.9 % — SIGNIFICANT CHANGE UP (ref 43–77)
NRBC # BLD: 0 /100 WBCS — SIGNIFICANT CHANGE UP (ref 0–0)
OB PNL STL: NEGATIVE — SIGNIFICANT CHANGE UP
PLATELET # BLD AUTO: 281 K/UL — SIGNIFICANT CHANGE UP (ref 150–400)
POTASSIUM SERPL-MCNC: 4.1 MMOL/L — SIGNIFICANT CHANGE UP (ref 3.5–5.3)
POTASSIUM SERPL-SCNC: 4.1 MMOL/L — SIGNIFICANT CHANGE UP (ref 3.5–5.3)
PROT SERPL-MCNC: 7.7 GM/DL — SIGNIFICANT CHANGE UP (ref 6–8.3)
PROTHROM AB SERPL-ACNC: 11.8 SEC — SIGNIFICANT CHANGE UP (ref 9.9–13.4)
RBC # BLD: 4.39 M/UL — SIGNIFICANT CHANGE UP (ref 4.2–5.8)
RBC # FLD: 13.8 % — SIGNIFICANT CHANGE UP (ref 10.3–14.5)
SODIUM SERPL-SCNC: 139 MMOL/L — SIGNIFICANT CHANGE UP (ref 135–145)
WBC # BLD: 6.4 K/UL — SIGNIFICANT CHANGE UP (ref 3.8–10.5)
WBC # FLD AUTO: 6.4 K/UL — SIGNIFICANT CHANGE UP (ref 3.8–10.5)

## 2024-10-20 RX ORDER — PANTOPRAZOLE SODIUM 40 MG/1
80 TABLET, DELAYED RELEASE ORAL ONCE
Refills: 0 | Status: COMPLETED | OUTPATIENT
Start: 2024-10-20 | End: 2024-10-20

## 2024-10-20 RX ORDER — ACETAMINOPHEN 325 MG
1000 TABLET ORAL ONCE
Refills: 0 | Status: COMPLETED | OUTPATIENT
Start: 2024-10-20 | End: 2024-10-20

## 2024-10-20 RX ADMIN — PANTOPRAZOLE SODIUM 80 MILLIGRAM(S): 40 TABLET, DELAYED RELEASE ORAL at 12:40

## 2024-10-20 RX ADMIN — Medication 400 MILLIGRAM(S): at 17:13

## 2024-10-20 NOTE — ED PROVIDER NOTE - NSFOLLOWUPCLINICS_GEN_ALL_ED_FT
Gastroenterology at [facility]  Gastroenterology  [address] [place] [address]  Phone: [phone]  Fax:     [facility] Gastroenterology  Gastroenterology  [address] [place] [id number]  Phone: [phone]  Fax:

## 2024-10-20 NOTE — ED ADULT NURSE NOTE - NSFALLHARMRISKINTERV_ED_ALL_ED

## 2024-10-20 NOTE — ED ADULT NURSE REASSESSMENT NOTE - NS ED NURSE REASSESS COMMENT FT1
Pt resting on stretcher. Safety maintained. Updates given. Bladder and bowel elimination facilitated. Clean linen exchange performed  IV site & patency inspected and integrity maintained. Snack offered and provided, tolerated. Pt pendign ambulance . Will continue to monitor.

## 2024-10-20 NOTE — ED ADULT NURSE REASSESSMENT NOTE - NS ED NURSE REASSESS COMMENT FT1
Handoff report received from DARIN Neely for shift change. Plan of care reviewed. Pt received resting on stretcher. Pt aox1, oriented to self baseline PMHx dementia. IV site & patency inspected and integrity maintained. rt FA 20g patent. Indwelling dumont drianing well. Pt denies pain, cp, sob, n/v/d. Universal fall precautions in place, side rails x2 raised,  socks provided, personal belongings within reach, oriented to call bell system. Will continue to monitor. Handoff report received from DARIN Neely for shift change. Plan of care reviewed. Pt received resting on stretcher. Pt aox1, oriented to self baseline PMHx dementia. IV site & patency inspected and integrity maintained. rt FA 20g patent. SUprapubic dumnot drianing well and producing yellow urine. Pt denies pain, cp, sob, n/v/d. Universal fall precautions in place, side rails x2 raised,  socks provided, personal belongings within reach, oriented to call bell system. Will continue to monitor.

## 2024-10-20 NOTE — ED ADULT TRIAGE NOTE - CHIEF COMPLAINT QUOTE
BIBA from home for diarrhea x 2 weeks, as per emt, son noticed the stools starting to turn black, patient complaining of neck, leg, head pain, alert and oriented x1  hx of htn, stents, dementia

## 2024-10-20 NOTE — ED PROVIDER NOTE - DISCHARGE REVIEW MATERIAL PRESENTED
. Pt was discharged home via [employer] [facility]. Discharge plan discussed with patient. Writer talked to pt about following with PCP or with the [place] [facility] information provided by CM. Pt verbalized understanding. Questions and concerns addressed. Belongings and valuables returned.

## 2024-10-20 NOTE — ED PROVIDER NOTE - PATIENT PORTAL LINK FT
You can access the FollowMyHealth Patient Portal offered by [facility] by registering at the following website: [email or web address]. By joining [employer]’s FollowMyHealth portal, you will also be able to view your health information using other applications (apps) compatible with our system.

## 2024-10-20 NOTE — ED PROVIDER NOTE - NSFOLLOWUPINSTRUCTIONS_ED_ALL_ED_FT
You were seen today for loose stools x 1 month with recent change in stool turning dark. 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 If he has not had consistent follow up with vascular surgery, he should make appointment     Return patient for any dizziness, fainting, chest pain, shortness of breath or low blood pressure. He did not have any significant anemia today but please have his primary care doctor monitor his blood counts.       Return patient to the hospital for any severe pain, change or coolness of the feet  or any worsening symptoms

## 2024-10-20 NOTE — ED ADULT NURSE NOTE - OBJECTIVE STATEMENT
89 year old male with PMH of HTN, heart surgery and stent placement. Dementia, suprapubic catheter. Pt BIBA from home for diarrhea x 2 weeks, as per son at bedside the stools starting to turn black, patient complaining of neck, leg, head pain, alert and oriented x1. Son unsure if patient is on blood thinners

## 2024-10-20 NOTE — ED PROVIDER NOTE - PHYSICAL EXAMINATION
Gen: aox2, NAD   Head: NCAT  ENT: Airway patent, moist mucous membranes, nasal passageways clear   Cardiac: Normal rate, normal rhythm   Respiratory: Lungs CTA B/L  Gastrointestinal: Abdomen soft, nontender, nondistended, no rebound,, suprapubic dumont in place with yellow urine  Rectal: dark greenish loose stool in diaper, no bright red blood, chaperoned by DARIN Roberto   MSK: No gross abnormalities, FROM of all four extremities, no edema  HEME: Extremities warm, pulses intact and symmetrical in LE   Skin: No rashes, no lesions  Neuro: No gross neurologic deficits

## 2024-10-21 ENCOUNTER — NON-APPOINTMENT (OUTPATIENT)
Age: 89
End: 2024-10-21

## 2024-10-21 VITALS
SYSTOLIC BLOOD PRESSURE: 160 MMHG | DIASTOLIC BLOOD PRESSURE: 95 MMHG | RESPIRATION RATE: 16 BRPM | TEMPERATURE: 98 F | OXYGEN SATURATION: 97 % | HEART RATE: 77 BPM

## 2024-10-22 ENCOUNTER — APPOINTMENT (OUTPATIENT)
Dept: HOME HEALTH SERVICES | Facility: HOME HEALTH | Age: 89
End: 2024-10-22

## 2024-10-25 ENCOUNTER — NON-APPOINTMENT (OUTPATIENT)
Age: 89
End: 2024-10-25

## 2024-11-20 PROBLEM — I10 ESSENTIAL (PRIMARY) HYPERTENSION: Chronic | Status: ACTIVE | Noted: 2024-10-22

## 2024-11-20 PROBLEM — I25.10 ATHEROSCLEROTIC HEART DISEASE OF NATIVE CORONARY ARTERY WITHOUT ANGINA PECTORIS: Chronic | Status: ACTIVE | Noted: 2024-10-22

## 2024-11-21 ENCOUNTER — APPOINTMENT (OUTPATIENT)
Dept: HOME HEALTH SERVICES | Facility: HOME HEALTH | Age: 89
End: 2024-11-21

## 2024-11-21 PROCEDURE — 99349 HOME/RES VST EST MOD MDM 40: CPT

## 2024-11-24 ENCOUNTER — INPATIENT (INPATIENT)
Facility: HOSPITAL | Age: 88
LOS: 5 days | Discharge: HOME | End: 2024-11-30
Attending: INTERNAL MEDICINE | Admitting: INTERNAL MEDICINE
Payer: MEDICARE

## 2024-11-24 VITALS
DIASTOLIC BLOOD PRESSURE: 90 MMHG | TEMPERATURE: 98 F | SYSTOLIC BLOOD PRESSURE: 143 MMHG | RESPIRATION RATE: 18 BRPM | HEIGHT: 66 IN | HEART RATE: 87 BPM | OXYGEN SATURATION: 95 %

## 2024-11-24 DIAGNOSIS — Z95.5 PRESENCE OF CORONARY ANGIOPLASTY IMPLANT AND GRAFT: Chronic | ICD-10-CM

## 2024-11-24 DIAGNOSIS — N39.0 URINARY TRACT INFECTION, SITE NOT SPECIFIED: ICD-10-CM

## 2024-11-24 DIAGNOSIS — Z95.1 PRESENCE OF AORTOCORONARY BYPASS GRAFT: Chronic | ICD-10-CM

## 2024-11-24 DIAGNOSIS — I65.21 OCCLUSION AND STENOSIS OF RIGHT CAROTID ARTERY: Chronic | ICD-10-CM

## 2024-11-24 LAB
ALBUMIN SERPL ELPH-MCNC: 3.1 G/DL — LOW (ref 3.3–5)
ALP SERPL-CCNC: 66 U/L — SIGNIFICANT CHANGE UP (ref 40–120)
ALT FLD-CCNC: 16 U/L — SIGNIFICANT CHANGE UP (ref 4–41)
ANION GAP SERPL CALC-SCNC: 11 MMOL/L — SIGNIFICANT CHANGE UP (ref 7–14)
APPEARANCE UR: ABNORMAL
APTT BLD: 28.4 SEC — SIGNIFICANT CHANGE UP (ref 24.5–35.6)
AST SERPL-CCNC: 20 U/L — SIGNIFICANT CHANGE UP (ref 4–40)
BACTERIA # UR AUTO: ABNORMAL /HPF
BASOPHILS # BLD AUTO: 0.05 K/UL — SIGNIFICANT CHANGE UP (ref 0–0.2)
BASOPHILS NFR BLD AUTO: 0.7 % — SIGNIFICANT CHANGE UP (ref 0–2)
BILIRUB SERPL-MCNC: 0.3 MG/DL — SIGNIFICANT CHANGE UP (ref 0.2–1.2)
BILIRUB UR-MCNC: NEGATIVE — SIGNIFICANT CHANGE UP
BLOOD GAS VENOUS COMPREHENSIVE RESULT: SIGNIFICANT CHANGE UP
BUN SERPL-MCNC: 17 MG/DL — SIGNIFICANT CHANGE UP (ref 7–23)
CALCIUM SERPL-MCNC: 9.5 MG/DL — SIGNIFICANT CHANGE UP (ref 8.4–10.5)
CAST: 4 /LPF — SIGNIFICANT CHANGE UP (ref 0–4)
CHLORIDE SERPL-SCNC: 103 MMOL/L — SIGNIFICANT CHANGE UP (ref 98–107)
CO2 SERPL-SCNC: 25 MMOL/L — SIGNIFICANT CHANGE UP (ref 22–31)
COLOR SPEC: YELLOW — SIGNIFICANT CHANGE UP
CREAT SERPL-MCNC: 1 MG/DL — SIGNIFICANT CHANGE UP (ref 0.5–1.3)
DIFF PNL FLD: ABNORMAL
EGFR: 72 ML/MIN/1.73M2 — SIGNIFICANT CHANGE UP
EGFR: 72 ML/MIN/1.73M2 — SIGNIFICANT CHANGE UP
EOSINOPHIL # BLD AUTO: 0.19 K/UL — SIGNIFICANT CHANGE UP (ref 0–0.5)
EOSINOPHIL NFR BLD AUTO: 2.6 % — SIGNIFICANT CHANGE UP (ref 0–6)
FLUAV AG NPH QL: SIGNIFICANT CHANGE UP
FLUBV AG NPH QL: SIGNIFICANT CHANGE UP
GLUCOSE SERPL-MCNC: 80 MG/DL — SIGNIFICANT CHANGE UP (ref 70–99)
GLUCOSE UR QL: NEGATIVE MG/DL — SIGNIFICANT CHANGE UP
HCT VFR BLD CALC: 34.4 % — LOW (ref 39–50)
HGB BLD-MCNC: 10.4 G/DL — LOW (ref 13–17)
IANC: 4.51 K/UL — SIGNIFICANT CHANGE UP (ref 1.8–7.4)
IMM GRANULOCYTES NFR BLD AUTO: 0.3 % — SIGNIFICANT CHANGE UP (ref 0–0.9)
INR BLD: 1.08 RATIO — SIGNIFICANT CHANGE UP (ref 0.85–1.16)
KETONES UR-MCNC: 15 MG/DL
LACTATE SERPL-SCNC: 0.9 MMOL/L — SIGNIFICANT CHANGE UP (ref 0.5–2)
LEUKOCYTE ESTERASE UR-ACNC: ABNORMAL
LYMPHOCYTES # BLD AUTO: 1.67 K/UL — SIGNIFICANT CHANGE UP (ref 1–3.3)
LYMPHOCYTES # BLD AUTO: 23 % — SIGNIFICANT CHANGE UP (ref 13–44)
MCHC RBC-ENTMCNC: 25.7 PG — LOW (ref 27–34)
MCHC RBC-ENTMCNC: 30.2 G/DL — LOW (ref 32–36)
MCV RBC AUTO: 85.1 FL — SIGNIFICANT CHANGE UP (ref 80–100)
MONOCYTES # BLD AUTO: 0.81 K/UL — SIGNIFICANT CHANGE UP (ref 0–0.9)
MONOCYTES NFR BLD AUTO: 11.2 % — SIGNIFICANT CHANGE UP (ref 2–14)
NEUTROPHILS # BLD AUTO: 4.51 K/UL — SIGNIFICANT CHANGE UP (ref 1.8–7.4)
NEUTROPHILS NFR BLD AUTO: 62.2 % — SIGNIFICANT CHANGE UP (ref 43–77)
NITRITE UR-MCNC: POSITIVE
NRBC # BLD AUTO: 0 K/UL — SIGNIFICANT CHANGE UP (ref 0–0)
NRBC # BLD: 0 /100 WBCS — SIGNIFICANT CHANGE UP (ref 0–0)
NRBC # FLD: 0 K/UL — SIGNIFICANT CHANGE UP (ref 0–0)
NRBC BLD-RTO: 0 /100 WBCS — SIGNIFICANT CHANGE UP (ref 0–0)
PH UR: 5.5 — SIGNIFICANT CHANGE UP (ref 5–8)
PLATELET # BLD AUTO: 327 K/UL — SIGNIFICANT CHANGE UP (ref 150–400)
POTASSIUM SERPL-MCNC: 4.3 MMOL/L — SIGNIFICANT CHANGE UP (ref 3.5–5.3)
POTASSIUM SERPL-SCNC: 4.3 MMOL/L — SIGNIFICANT CHANGE UP (ref 3.5–5.3)
PROT SERPL-MCNC: 7.1 G/DL — SIGNIFICANT CHANGE UP (ref 6–8.3)
PROT UR-MCNC: 100 MG/DL
PROTHROM AB SERPL-ACNC: 12.9 SEC — SIGNIFICANT CHANGE UP (ref 9.9–13.4)
RBC # BLD: 4.04 M/UL — LOW (ref 4.2–5.8)
RBC # FLD: 14.6 % — HIGH (ref 10.3–14.5)
RBC CASTS # UR COMP ASSIST: 17 /HPF — HIGH (ref 0–4)
REVIEW: SIGNIFICANT CHANGE UP
RSV RNA NPH QL NAA+NON-PROBE: SIGNIFICANT CHANGE UP
SARS-COV-2 RNA SPEC QL NAA+PROBE: SIGNIFICANT CHANGE UP
SODIUM SERPL-SCNC: 139 MMOL/L — SIGNIFICANT CHANGE UP (ref 135–145)
SP GR SPEC: 1.02 — SIGNIFICANT CHANGE UP (ref 1–1.03)
SQUAMOUS # UR AUTO: 0 /HPF — SIGNIFICANT CHANGE UP (ref 0–5)
TROPONIN T, HIGH SENSITIVITY RESULT: 34 NG/L — SIGNIFICANT CHANGE UP
TROPONIN T, HIGH SENSITIVITY RESULT: 39 NG/L — SIGNIFICANT CHANGE UP
UROBILINOGEN FLD QL: 0.2 MG/DL — SIGNIFICANT CHANGE UP (ref 0.2–1)
WBC # BLD: 7.25 K/UL — SIGNIFICANT CHANGE UP (ref 3.8–10.5)
WBC # FLD AUTO: 7.25 K/UL — SIGNIFICANT CHANGE UP (ref 3.8–10.5)
WBC UR QL: 528 /HPF — HIGH (ref 0–5)

## 2024-11-24 PROCEDURE — 74176 CT ABD & PELVIS W/O CONTRAST: CPT | Mod: 26,MC

## 2024-11-24 PROCEDURE — 99285 EMERGENCY DEPT VISIT HI MDM: CPT

## 2024-11-24 PROCEDURE — 71045 X-RAY EXAM CHEST 1 VIEW: CPT | Mod: 26

## 2024-11-24 PROCEDURE — 70450 CT HEAD/BRAIN W/O DYE: CPT | Mod: 26,MC

## 2024-11-24 PROCEDURE — 99223 1ST HOSP IP/OBS HIGH 75: CPT

## 2024-11-24 RX ORDER — MINERAL OIL
133 OIL (ML) MISCELLANEOUS ONCE
Refills: 0 | Status: COMPLETED | OUTPATIENT
Start: 2024-11-24 | End: 2024-11-24

## 2024-11-24 RX ORDER — PIPERACILLIN-TAZO-DEXTROSE,ISO 3.375G/5
3.38 IV SOLUTION, PIGGYBACK PREMIX FROZEN(ML) INTRAVENOUS ONCE
Refills: 0 | Status: COMPLETED | OUTPATIENT
Start: 2024-11-24 | End: 2024-11-24

## 2024-11-24 RX ORDER — TRAZODONE HCL 100 MG
100 TABLET ORAL ONCE
Refills: 0 | Status: DISCONTINUED | OUTPATIENT
Start: 2024-11-24 | End: 2024-11-25

## 2024-11-24 RX ORDER — POLYETHYLENE GLYCOL 3350 17 G/17G
17 POWDER, FOR SOLUTION ORAL ONCE
Refills: 0 | Status: COMPLETED | OUTPATIENT
Start: 2024-11-24 | End: 2024-11-24

## 2024-11-24 RX ORDER — LATANOPROST PF 0.05 MG/ML
1 SOLUTION/ DROPS OPHTHALMIC ONCE
Refills: 0 | Status: COMPLETED | OUTPATIENT
Start: 2024-11-24 | End: 2024-11-24

## 2024-11-24 RX ORDER — CEFPODOXIME PROXETIL 200 MG/1
200 TABLET, FILM COATED ORAL ONCE
Refills: 0 | Status: COMPLETED | OUTPATIENT
Start: 2024-11-24 | End: 2024-11-24

## 2024-11-24 RX ORDER — MAGNESIUM SULFATE 500 MG/ML
1 SYRINGE (ML) INJECTION ONCE
Refills: 0 | Status: COMPLETED | OUTPATIENT
Start: 2024-11-24 | End: 2024-11-24

## 2024-11-24 RX ORDER — CEFPODOXIME PROXETIL 200 MG/1
1 TABLET, FILM COATED ORAL
Qty: 20 | Refills: 0
Start: 2024-11-24 | End: 2024-12-03

## 2024-11-24 RX ADMIN — Medication 100 GRAM(S): at 22:27

## 2024-11-24 RX ADMIN — LATANOPROST PF 1 DROP(S): 0.05 SOLUTION/ DROPS OPHTHALMIC at 19:06

## 2024-11-24 RX ADMIN — CEFPODOXIME PROXETIL 200 MILLIGRAM(S): 200 TABLET, FILM COATED ORAL at 18:09

## 2024-11-24 RX ADMIN — Medication 1000 MILLILITER(S): at 15:15

## 2024-11-24 RX ADMIN — Medication 200 GRAM(S): at 22:26

## 2024-11-25 ENCOUNTER — NON-APPOINTMENT (OUTPATIENT)
Age: 89
End: 2024-11-25

## 2024-11-25 ENCOUNTER — TRANSCRIPTION ENCOUNTER (OUTPATIENT)
Age: 89
End: 2024-11-25

## 2024-11-25 DIAGNOSIS — I25.10 ATHEROSCLEROTIC HEART DISEASE OF NATIVE CORONARY ARTERY WITHOUT ANGINA PECTORIS: ICD-10-CM

## 2024-11-25 DIAGNOSIS — I10 ESSENTIAL (PRIMARY) HYPERTENSION: ICD-10-CM

## 2024-11-25 DIAGNOSIS — Z29.9 ENCOUNTER FOR PROPHYLACTIC MEASURES, UNSPECIFIED: ICD-10-CM

## 2024-11-25 DIAGNOSIS — K59.00 CONSTIPATION, UNSPECIFIED: ICD-10-CM

## 2024-11-25 DIAGNOSIS — N40.0 BENIGN PROSTATIC HYPERPLASIA WITHOUT LOWER URINARY TRACT SYMPTOMS: ICD-10-CM

## 2024-11-25 DIAGNOSIS — N39.0 URINARY TRACT INFECTION, SITE NOT SPECIFIED: ICD-10-CM

## 2024-11-25 LAB
GLUCOSE BLDC GLUCOMTR-MCNC: 102 MG/DL — HIGH (ref 70–99)
MRSA PCR RESULT.: SIGNIFICANT CHANGE UP
S AUREUS DNA NOSE QL NAA+PROBE: DETECTED

## 2024-11-25 PROCEDURE — 99232 SBSQ HOSP IP/OBS MODERATE 35: CPT

## 2024-11-25 RX ORDER — ASPIRIN 325 MG
81 TABLET ORAL DAILY
Refills: 0 | Status: DISCONTINUED | OUTPATIENT
Start: 2024-11-25 | End: 2024-11-30

## 2024-11-25 RX ORDER — SENNA 187 MG
2 TABLET ORAL AT BEDTIME
Refills: 0 | Status: DISCONTINUED | OUTPATIENT
Start: 2024-11-25 | End: 2024-11-27

## 2024-11-25 RX ORDER — CARVEDILOL 3.12 MG/1
6.25 TABLET, FILM COATED ORAL EVERY 12 HOURS
Refills: 0 | Status: DISCONTINUED | OUTPATIENT
Start: 2024-11-25 | End: 2024-11-30

## 2024-11-25 RX ORDER — FINASTERIDE 1 MG/1
5 TABLET, FILM COATED ORAL DAILY
Refills: 0 | Status: DISCONTINUED | OUTPATIENT
Start: 2024-11-25 | End: 2024-11-30

## 2024-11-25 RX ORDER — BISACODYL 5 MG
5 TABLET, DELAYED RELEASE (ENTERIC COATED) ORAL EVERY 12 HOURS
Refills: 0 | Status: DISCONTINUED | OUTPATIENT
Start: 2024-11-25 | End: 2024-11-30

## 2024-11-25 RX ORDER — TAMSULOSIN HYDROCHLORIDE 0.4 MG/1
0.4 CAPSULE ORAL AT BEDTIME
Refills: 0 | Status: DISCONTINUED | OUTPATIENT
Start: 2024-11-25 | End: 2024-11-30

## 2024-11-25 RX ORDER — LATANOPROST PF 0.05 MG/ML
1 SOLUTION/ DROPS OPHTHALMIC AT BEDTIME
Refills: 0 | Status: DISCONTINUED | OUTPATIENT
Start: 2024-11-25 | End: 2024-11-30

## 2024-11-25 RX ORDER — TRAZODONE HCL 100 MG
50 TABLET ORAL AT BEDTIME
Refills: 0 | Status: DISCONTINUED | OUTPATIENT
Start: 2024-11-25 | End: 2024-11-30

## 2024-11-25 RX ORDER — POLYETHYLENE GLYCOL 3350 17 G/17G
17 POWDER, FOR SOLUTION ORAL
Refills: 0 | Status: DISCONTINUED | OUTPATIENT
Start: 2024-11-25 | End: 2024-11-27

## 2024-11-25 RX ORDER — CEFTRIAXONE 500 MG/1
1000 INJECTION, POWDER, FOR SOLUTION INTRAMUSCULAR; INTRAVENOUS EVERY 24 HOURS
Refills: 0 | Status: DISCONTINUED | OUTPATIENT
Start: 2024-11-25 | End: 2024-11-25

## 2024-11-25 RX ORDER — ENOXAPARIN SODIUM 100 MG/ML
40 INJECTION SUBCUTANEOUS EVERY 24 HOURS
Refills: 0 | Status: DISCONTINUED | OUTPATIENT
Start: 2024-11-25 | End: 2024-11-30

## 2024-11-25 RX ORDER — ACETAMINOPHEN 500 MG/5ML
650 LIQUID (ML) ORAL EVERY 6 HOURS
Refills: 0 | Status: DISCONTINUED | OUTPATIENT
Start: 2024-11-25 | End: 2024-11-30

## 2024-11-25 RX ORDER — PIPERACILLIN-TAZO-DEXTROSE,ISO 3.375G/5
3.38 IV SOLUTION, PIGGYBACK PREMIX FROZEN(ML) INTRAVENOUS EVERY 8 HOURS
Refills: 0 | Status: DISCONTINUED | OUTPATIENT
Start: 2024-11-25 | End: 2024-11-30

## 2024-11-25 RX ADMIN — POLYETHYLENE GLYCOL 3350 17 GRAM(S): 17 POWDER, FOR SOLUTION ORAL at 17:41

## 2024-11-25 RX ADMIN — Medication 81 MILLIGRAM(S): at 13:30

## 2024-11-25 RX ADMIN — POLYETHYLENE GLYCOL 3350 17 GRAM(S): 17 POWDER, FOR SOLUTION ORAL at 07:47

## 2024-11-25 RX ADMIN — CARVEDILOL 6.25 MILLIGRAM(S): 3.12 TABLET, FILM COATED ORAL at 05:37

## 2024-11-25 RX ADMIN — CEFTRIAXONE 100 MILLIGRAM(S): 500 INJECTION, POWDER, FOR SOLUTION INTRAMUSCULAR; INTRAVENOUS at 04:14

## 2024-11-25 RX ADMIN — CARVEDILOL 6.25 MILLIGRAM(S): 3.12 TABLET, FILM COATED ORAL at 17:43

## 2024-11-25 RX ADMIN — Medication 25 GRAM(S): at 13:31

## 2024-11-25 RX ADMIN — Medication 25 GRAM(S): at 22:30

## 2024-11-25 RX ADMIN — Medication 50 MILLIGRAM(S): at 22:31

## 2024-11-25 RX ADMIN — Medication 1 APPLICATION(S): at 13:31

## 2024-11-25 RX ADMIN — ENOXAPARIN SODIUM 40 MILLIGRAM(S): 100 INJECTION SUBCUTANEOUS at 22:30

## 2024-11-25 RX ADMIN — TAMSULOSIN HYDROCHLORIDE 0.4 MILLIGRAM(S): 0.4 CAPSULE ORAL at 22:31

## 2024-11-25 RX ADMIN — Medication 2 TABLET(S): at 22:31

## 2024-11-25 RX ADMIN — FINASTERIDE 5 MILLIGRAM(S): 1 TABLET, FILM COATED ORAL at 13:30

## 2024-11-26 ENCOUNTER — TRANSCRIPTION ENCOUNTER (OUTPATIENT)
Age: 89
End: 2024-11-26

## 2024-11-26 DIAGNOSIS — R62.7 ADULT FAILURE TO THRIVE: ICD-10-CM

## 2024-11-26 LAB
ANION GAP SERPL CALC-SCNC: 10 MMOL/L — SIGNIFICANT CHANGE UP (ref 7–14)
ANION GAP SERPL CALC-SCNC: 11 MMOL/L — SIGNIFICANT CHANGE UP (ref 7–14)
BUN SERPL-MCNC: 16 MG/DL — SIGNIFICANT CHANGE UP (ref 7–23)
BUN SERPL-MCNC: 16 MG/DL — SIGNIFICANT CHANGE UP (ref 7–23)
CALCIUM SERPL-MCNC: 8.7 MG/DL — SIGNIFICANT CHANGE UP (ref 8.4–10.5)
CALCIUM SERPL-MCNC: 8.7 MG/DL — SIGNIFICANT CHANGE UP (ref 8.4–10.5)
CHLORIDE SERPL-SCNC: 106 MMOL/L — SIGNIFICANT CHANGE UP (ref 98–107)
CHLORIDE SERPL-SCNC: 108 MMOL/L — HIGH (ref 98–107)
CO2 SERPL-SCNC: 23 MMOL/L — SIGNIFICANT CHANGE UP (ref 22–31)
CO2 SERPL-SCNC: 25 MMOL/L — SIGNIFICANT CHANGE UP (ref 22–31)
CREAT SERPL-MCNC: 1.1 MG/DL — SIGNIFICANT CHANGE UP (ref 0.5–1.3)
CREAT SERPL-MCNC: 1.18 MG/DL — SIGNIFICANT CHANGE UP (ref 0.5–1.3)
EGFR: 59 ML/MIN/1.73M2 — LOW
EGFR: 59 ML/MIN/1.73M2 — LOW
EGFR: 64 ML/MIN/1.73M2 — SIGNIFICANT CHANGE UP
EGFR: 64 ML/MIN/1.73M2 — SIGNIFICANT CHANGE UP
GLUCOSE SERPL-MCNC: 71 MG/DL — SIGNIFICANT CHANGE UP (ref 70–99)
GLUCOSE SERPL-MCNC: 81 MG/DL — SIGNIFICANT CHANGE UP (ref 70–99)
HCT VFR BLD CALC: 30.3 % — LOW (ref 39–50)
HCT VFR BLD CALC: 33 % — LOW (ref 39–50)
HGB BLD-MCNC: 9.1 G/DL — LOW (ref 13–17)
HGB BLD-MCNC: 9.8 G/DL — LOW (ref 13–17)
MAGNESIUM SERPL-MCNC: 2.2 MG/DL — SIGNIFICANT CHANGE UP (ref 1.6–2.6)
MCHC RBC-ENTMCNC: 25.5 PG — LOW (ref 27–34)
MCHC RBC-ENTMCNC: 25.9 PG — LOW (ref 27–34)
MCHC RBC-ENTMCNC: 29.7 G/DL — LOW (ref 32–36)
MCHC RBC-ENTMCNC: 30 G/DL — LOW (ref 32–36)
MCV RBC AUTO: 85.7 FL — SIGNIFICANT CHANGE UP (ref 80–100)
MCV RBC AUTO: 86.1 FL — SIGNIFICANT CHANGE UP (ref 80–100)
NRBC # BLD AUTO: 0 K/UL — SIGNIFICANT CHANGE UP (ref 0–0)
NRBC # BLD AUTO: 0 K/UL — SIGNIFICANT CHANGE UP (ref 0–0)
NRBC # BLD: 0 /100 WBCS — SIGNIFICANT CHANGE UP (ref 0–0)
NRBC # BLD: 0 /100 WBCS — SIGNIFICANT CHANGE UP (ref 0–0)
NRBC # FLD: 0 K/UL — SIGNIFICANT CHANGE UP (ref 0–0)
NRBC # FLD: 0 K/UL — SIGNIFICANT CHANGE UP (ref 0–0)
NRBC BLD-RTO: 0 /100 WBCS — SIGNIFICANT CHANGE UP (ref 0–0)
NRBC BLD-RTO: 0 /100 WBCS — SIGNIFICANT CHANGE UP (ref 0–0)
PHOSPHATE SERPL-MCNC: 3.6 MG/DL — SIGNIFICANT CHANGE UP (ref 2.5–4.5)
PLATELET # BLD AUTO: 290 K/UL — SIGNIFICANT CHANGE UP (ref 150–400)
PLATELET # BLD AUTO: 351 K/UL — SIGNIFICANT CHANGE UP (ref 150–400)
POTASSIUM SERPL-MCNC: 3.6 MMOL/L — SIGNIFICANT CHANGE UP (ref 3.5–5.3)
POTASSIUM SERPL-MCNC: 4.9 MMOL/L — SIGNIFICANT CHANGE UP (ref 3.5–5.3)
POTASSIUM SERPL-SCNC: 3.6 MMOL/L — SIGNIFICANT CHANGE UP (ref 3.5–5.3)
POTASSIUM SERPL-SCNC: 4.9 MMOL/L — SIGNIFICANT CHANGE UP (ref 3.5–5.3)
RBC # BLD: 3.52 M/UL — LOW (ref 4.2–5.8)
RBC # BLD: 3.85 M/UL — LOW (ref 4.2–5.8)
RBC # FLD: 14.8 % — HIGH (ref 10.3–14.5)
RBC # FLD: 14.9 % — HIGH (ref 10.3–14.5)
SODIUM SERPL-SCNC: 140 MMOL/L — SIGNIFICANT CHANGE UP (ref 135–145)
SODIUM SERPL-SCNC: 143 MMOL/L — SIGNIFICANT CHANGE UP (ref 135–145)
WBC # BLD: 6.3 K/UL — SIGNIFICANT CHANGE UP (ref 3.8–10.5)
WBC # BLD: 6.59 K/UL — SIGNIFICANT CHANGE UP (ref 3.8–10.5)
WBC # FLD AUTO: 6.3 K/UL — SIGNIFICANT CHANGE UP (ref 3.8–10.5)
WBC # FLD AUTO: 6.59 K/UL — SIGNIFICANT CHANGE UP (ref 3.8–10.5)

## 2024-11-26 PROCEDURE — 99222 1ST HOSP IP/OBS MODERATE 55: CPT | Mod: GC

## 2024-11-26 PROCEDURE — 99232 SBSQ HOSP IP/OBS MODERATE 35: CPT

## 2024-11-26 RX ORDER — B1/B2/B3/B5/B6/B12/VIT C/FOLIC 500-0.5 MG
1 TABLET ORAL DAILY
Refills: 0 | Status: DISCONTINUED | OUTPATIENT
Start: 2024-11-26 | End: 2024-11-30

## 2024-11-26 RX ADMIN — POLYETHYLENE GLYCOL 3350 17 GRAM(S): 17 POWDER, FOR SOLUTION ORAL at 06:03

## 2024-11-26 RX ADMIN — Medication 650 MILLIGRAM(S): at 18:22

## 2024-11-26 RX ADMIN — LATANOPROST PF 1 DROP(S): 0.05 SOLUTION/ DROPS OPHTHALMIC at 21:36

## 2024-11-26 RX ADMIN — Medication 2 TABLET(S): at 21:36

## 2024-11-26 RX ADMIN — Medication 1 APPLICATION(S): at 13:34

## 2024-11-26 RX ADMIN — CARVEDILOL 6.25 MILLIGRAM(S): 3.12 TABLET, FILM COATED ORAL at 18:22

## 2024-11-26 RX ADMIN — Medication 25 GRAM(S): at 21:40

## 2024-11-26 RX ADMIN — FINASTERIDE 5 MILLIGRAM(S): 1 TABLET, FILM COATED ORAL at 13:33

## 2024-11-26 RX ADMIN — Medication 25 GRAM(S): at 06:04

## 2024-11-26 RX ADMIN — Medication 81 MILLIGRAM(S): at 13:33

## 2024-11-26 RX ADMIN — Medication 1 TABLET(S): at 18:22

## 2024-11-26 RX ADMIN — LATANOPROST PF 1 DROP(S): 0.05 SOLUTION/ DROPS OPHTHALMIC at 00:00

## 2024-11-26 RX ADMIN — TAMSULOSIN HYDROCHLORIDE 0.4 MILLIGRAM(S): 0.4 CAPSULE ORAL at 21:35

## 2024-11-26 RX ADMIN — Medication 50 MILLIGRAM(S): at 21:35

## 2024-11-26 RX ADMIN — Medication 25 GRAM(S): at 13:46

## 2024-11-26 RX ADMIN — ENOXAPARIN SODIUM 40 MILLIGRAM(S): 100 INJECTION SUBCUTANEOUS at 21:36

## 2024-11-26 RX ADMIN — POLYETHYLENE GLYCOL 3350 17 GRAM(S): 17 POWDER, FOR SOLUTION ORAL at 18:06

## 2024-11-26 RX ADMIN — CARVEDILOL 6.25 MILLIGRAM(S): 3.12 TABLET, FILM COATED ORAL at 06:04

## 2024-11-27 ENCOUNTER — TRANSCRIPTION ENCOUNTER (OUTPATIENT)
Age: 89
End: 2024-11-27

## 2024-11-27 LAB
-  AMOXICILLIN/CLAVULANIC ACID: SIGNIFICANT CHANGE UP
-  AMPICILLIN/SULBACTAM: SIGNIFICANT CHANGE UP
-  AMPICILLIN: SIGNIFICANT CHANGE UP
-  AZTREONAM: SIGNIFICANT CHANGE UP
-  CEFAZOLIN: SIGNIFICANT CHANGE UP
-  CEFEPIME: SIGNIFICANT CHANGE UP
-  CEFOXITIN: SIGNIFICANT CHANGE UP
-  CEFTRIAXONE: SIGNIFICANT CHANGE UP
-  CEFUROXIME: SIGNIFICANT CHANGE UP
-  CIPROFLOXACIN: SIGNIFICANT CHANGE UP
-  ERTAPENEM: SIGNIFICANT CHANGE UP
-  GENTAMICIN: SIGNIFICANT CHANGE UP
-  GENTAMICIN: SIGNIFICANT CHANGE UP
-  IMIPENEM: SIGNIFICANT CHANGE UP
-  LEVOFLOXACIN: SIGNIFICANT CHANGE UP
-  MEROPENEM: SIGNIFICANT CHANGE UP
-  NITROFURANTOIN: SIGNIFICANT CHANGE UP
-  NITROFURANTOIN: SIGNIFICANT CHANGE UP
-  OXACILLIN: SIGNIFICANT CHANGE UP
-  PENICILLIN: SIGNIFICANT CHANGE UP
-  PIPERACILLIN/TAZOBACTAM: SIGNIFICANT CHANGE UP
-  RIFAMPIN: SIGNIFICANT CHANGE UP
-  TETRACYCLINE: SIGNIFICANT CHANGE UP
-  TOBRAMYCIN: SIGNIFICANT CHANGE UP
-  TRIMETHOPRIM/SULFAMETHOXAZOLE: SIGNIFICANT CHANGE UP
-  TRIMETHOPRIM/SULFAMETHOXAZOLE: SIGNIFICANT CHANGE UP
-  VANCOMYCIN: SIGNIFICANT CHANGE UP
ADD ON TEST-SPECIMEN IN LAB: SIGNIFICANT CHANGE UP
ANION GAP SERPL CALC-SCNC: 9 MMOL/L — SIGNIFICANT CHANGE UP (ref 7–14)
BUN SERPL-MCNC: 18 MG/DL — SIGNIFICANT CHANGE UP (ref 7–23)
CALCIUM SERPL-MCNC: 8.8 MG/DL — SIGNIFICANT CHANGE UP (ref 8.4–10.5)
CHLORIDE SERPL-SCNC: 106 MMOL/L — SIGNIFICANT CHANGE UP (ref 98–107)
CO2 SERPL-SCNC: 27 MMOL/L — SIGNIFICANT CHANGE UP (ref 22–31)
CREAT SERPL-MCNC: 1.2 MG/DL — SIGNIFICANT CHANGE UP (ref 0.5–1.3)
CULTURE RESULTS: ABNORMAL
EGFR: 58 ML/MIN/1.73M2 — LOW
EGFR: 58 ML/MIN/1.73M2 — LOW
FLUAV AG NPH QL: SIGNIFICANT CHANGE UP
FLUBV AG NPH QL: SIGNIFICANT CHANGE UP
GLUCOSE SERPL-MCNC: 99 MG/DL — SIGNIFICANT CHANGE UP (ref 70–99)
HCT VFR BLD CALC: 30.8 % — LOW (ref 39–50)
HGB BLD-MCNC: 9.3 G/DL — LOW (ref 13–17)
MCHC RBC-ENTMCNC: 25.8 PG — LOW (ref 27–34)
MCHC RBC-ENTMCNC: 30.2 G/DL — LOW (ref 32–36)
MCV RBC AUTO: 85.3 FL — SIGNIFICANT CHANGE UP (ref 80–100)
METHOD TYPE: SIGNIFICANT CHANGE UP
METHOD TYPE: SIGNIFICANT CHANGE UP
NRBC # BLD AUTO: 0 K/UL — SIGNIFICANT CHANGE UP (ref 0–0)
NRBC # BLD: 0 /100 WBCS — SIGNIFICANT CHANGE UP (ref 0–0)
NRBC # FLD: 0 K/UL — SIGNIFICANT CHANGE UP (ref 0–0)
NRBC BLD-RTO: 0 /100 WBCS — SIGNIFICANT CHANGE UP (ref 0–0)
ORGANISM # SPEC MICROSCOPIC CNT: ABNORMAL
PLATELET # BLD AUTO: 337 K/UL — SIGNIFICANT CHANGE UP (ref 150–400)
POTASSIUM SERPL-MCNC: 3.6 MMOL/L — SIGNIFICANT CHANGE UP (ref 3.5–5.3)
POTASSIUM SERPL-SCNC: 3.6 MMOL/L — SIGNIFICANT CHANGE UP (ref 3.5–5.3)
RBC # BLD: 3.61 M/UL — LOW (ref 4.2–5.8)
RBC # FLD: 14.9 % — HIGH (ref 10.3–14.5)
RSV RNA NPH QL NAA+NON-PROBE: SIGNIFICANT CHANGE UP
SARS-COV-2 RNA SPEC QL NAA+PROBE: SIGNIFICANT CHANGE UP
SODIUM SERPL-SCNC: 142 MMOL/L — SIGNIFICANT CHANGE UP (ref 135–145)
SPECIMEN SOURCE: SIGNIFICANT CHANGE UP
WBC # BLD: 7.57 K/UL — SIGNIFICANT CHANGE UP (ref 3.8–10.5)
WBC # FLD AUTO: 7.57 K/UL — SIGNIFICANT CHANGE UP (ref 3.8–10.5)

## 2024-11-27 PROCEDURE — 70450 CT HEAD/BRAIN W/O DYE: CPT | Mod: 26

## 2024-11-27 PROCEDURE — 72170 X-RAY EXAM OF PELVIS: CPT | Mod: 26

## 2024-11-27 PROCEDURE — 72128 CT CHEST SPINE W/O DYE: CPT | Mod: 26

## 2024-11-27 PROCEDURE — 74019 RADEX ABDOMEN 2 VIEWS: CPT | Mod: 26

## 2024-11-27 PROCEDURE — 71045 X-RAY EXAM CHEST 1 VIEW: CPT | Mod: 26,77

## 2024-11-27 PROCEDURE — 72125 CT NECK SPINE W/O DYE: CPT | Mod: 26

## 2024-11-27 PROCEDURE — 72131 CT LUMBAR SPINE W/O DYE: CPT | Mod: 26

## 2024-11-27 PROCEDURE — 72192 CT PELVIS W/O DYE: CPT | Mod: 26

## 2024-11-27 PROCEDURE — 71045 X-RAY EXAM CHEST 1 VIEW: CPT | Mod: 26

## 2024-11-27 PROCEDURE — 99233 SBSQ HOSP IP/OBS HIGH 50: CPT

## 2024-11-27 RX ORDER — SENNA 187 MG
2 TABLET ORAL
Qty: 14 | Refills: 0
Start: 2024-11-27 | End: 2024-12-03

## 2024-11-27 RX ORDER — POLYETHYLENE GLYCOL 3350 17 G/17G
17 POWDER, FOR SOLUTION ORAL
Qty: 0 | Refills: 0 | DISCHARGE
Start: 2024-11-27

## 2024-11-27 RX ORDER — B1/B2/B3/B5/B6/B12/VIT C/FOLIC 500-0.5 MG
1 TABLET ORAL
Qty: 0 | Refills: 0 | DISCHARGE
Start: 2024-11-27

## 2024-11-27 RX ADMIN — CARVEDILOL 6.25 MILLIGRAM(S): 3.12 TABLET, FILM COATED ORAL at 05:35

## 2024-11-27 RX ADMIN — Medication 650 MILLIGRAM(S): at 06:28

## 2024-11-27 RX ADMIN — Medication 650 MILLIGRAM(S): at 06:55

## 2024-11-27 RX ADMIN — Medication 25 GRAM(S): at 14:55

## 2024-11-27 RX ADMIN — Medication 1 TABLET(S): at 12:02

## 2024-11-27 RX ADMIN — POLYETHYLENE GLYCOL 3350 17 GRAM(S): 17 POWDER, FOR SOLUTION ORAL at 05:34

## 2024-11-27 RX ADMIN — FINASTERIDE 5 MILLIGRAM(S): 1 TABLET, FILM COATED ORAL at 12:02

## 2024-11-27 RX ADMIN — Medication 25 GRAM(S): at 05:34

## 2024-11-27 RX ADMIN — Medication 1 APPLICATION(S): at 12:02

## 2024-11-27 RX ADMIN — Medication 81 MILLIGRAM(S): at 12:02

## 2024-11-27 RX ADMIN — CARVEDILOL 6.25 MILLIGRAM(S): 3.12 TABLET, FILM COATED ORAL at 17:29
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
## 2024-11-28 LAB
ANION GAP SERPL CALC-SCNC: 9 MMOL/L — SIGNIFICANT CHANGE UP (ref 7–14)
BLOOD GAS VENOUS COMPREHENSIVE RESULT: SIGNIFICANT CHANGE UP
BUN SERPL-MCNC: 19 MG/DL — SIGNIFICANT CHANGE UP (ref 7–23)
CALCIUM SERPL-MCNC: 8.8 MG/DL — SIGNIFICANT CHANGE UP (ref 8.4–10.5)
CHLORIDE SERPL-SCNC: 107 MMOL/L — SIGNIFICANT CHANGE UP (ref 98–107)
CO2 SERPL-SCNC: 26 MMOL/L — SIGNIFICANT CHANGE UP (ref 22–31)
CREAT SERPL-MCNC: 1.24 MG/DL — SIGNIFICANT CHANGE UP (ref 0.5–1.3)
EGFR: 56 ML/MIN/1.73M2 — LOW
EGFR: 56 ML/MIN/1.73M2 — LOW
GLUCOSE SERPL-MCNC: 127 MG/DL — HIGH (ref 70–99)
HCT VFR BLD CALC: 32.3 % — LOW (ref 39–50)
HGB BLD-MCNC: 9.9 G/DL — LOW (ref 13–17)
MAGNESIUM SERPL-MCNC: 2.1 MG/DL — SIGNIFICANT CHANGE UP (ref 1.6–2.6)
MCHC RBC-ENTMCNC: 26.3 PG — LOW (ref 27–34)
MCHC RBC-ENTMCNC: 30.7 G/DL — LOW (ref 32–36)
MCV RBC AUTO: 85.7 FL — SIGNIFICANT CHANGE UP (ref 80–100)
NRBC # BLD AUTO: 0 K/UL — SIGNIFICANT CHANGE UP (ref 0–0)
NRBC # BLD: 0 /100 WBCS — SIGNIFICANT CHANGE UP (ref 0–0)
NRBC # FLD: 0 K/UL — SIGNIFICANT CHANGE UP (ref 0–0)
NRBC BLD-RTO: 0 /100 WBCS — SIGNIFICANT CHANGE UP (ref 0–0)
PHOSPHATE SERPL-MCNC: 2.9 MG/DL — SIGNIFICANT CHANGE UP (ref 2.5–4.5)
PLATELET # BLD AUTO: 328 K/UL — SIGNIFICANT CHANGE UP (ref 150–400)
POTASSIUM SERPL-MCNC: 3.5 MMOL/L — SIGNIFICANT CHANGE UP (ref 3.5–5.3)
POTASSIUM SERPL-SCNC: 3.5 MMOL/L — SIGNIFICANT CHANGE UP (ref 3.5–5.3)
RBC # BLD: 3.77 M/UL — LOW (ref 4.2–5.8)
RBC # FLD: 14.8 % — HIGH (ref 10.3–14.5)
SODIUM SERPL-SCNC: 142 MMOL/L — SIGNIFICANT CHANGE UP (ref 135–145)
WBC # BLD: 7.31 K/UL — SIGNIFICANT CHANGE UP (ref 3.8–10.5)
WBC # FLD AUTO: 7.31 K/UL — SIGNIFICANT CHANGE UP (ref 3.8–10.5)

## 2024-11-28 PROCEDURE — 99233 SBSQ HOSP IP/OBS HIGH 50: CPT

## 2024-11-28 RX ORDER — POLYETHYLENE GLYCOL 3350 17 G/17G
17 POWDER, FOR SOLUTION ORAL
Refills: 0 | Status: DISCONTINUED | OUTPATIENT
Start: 2024-11-28 | End: 2024-11-30

## 2024-11-28 RX ORDER — SENNA 187 MG
2 TABLET ORAL AT BEDTIME
Refills: 0 | Status: DISCONTINUED | OUTPATIENT
Start: 2024-11-28 | End: 2024-11-30

## 2024-11-28 RX ADMIN — Medication 25 GRAM(S): at 14:30

## 2024-11-28 RX ADMIN — Medication 50 MILLIGRAM(S): at 22:04

## 2024-11-28 RX ADMIN — Medication 1 TABLET(S): at 12:12

## 2024-11-28 RX ADMIN — CARVEDILOL 6.25 MILLIGRAM(S): 3.12 TABLET, FILM COATED ORAL at 17:39

## 2024-11-28 RX ADMIN — Medication 50 MILLIGRAM(S): at 00:20

## 2024-11-28 RX ADMIN — TAMSULOSIN HYDROCHLORIDE 0.4 MILLIGRAM(S): 0.4 CAPSULE ORAL at 00:21

## 2024-11-28 RX ADMIN — LATANOPROST PF 1 DROP(S): 0.05 SOLUTION/ DROPS OPHTHALMIC at 22:03

## 2024-11-28 RX ADMIN — CARVEDILOL 6.25 MILLIGRAM(S): 3.12 TABLET, FILM COATED ORAL at 06:03

## 2024-11-28 RX ADMIN — Medication 1 APPLICATION(S): at 12:12

## 2024-11-28 RX ADMIN — Medication 25 GRAM(S): at 05:48

## 2024-11-28 RX ADMIN — Medication 81 MILLIGRAM(S): at 12:12

## 2024-11-28 RX ADMIN — TAMSULOSIN HYDROCHLORIDE 0.4 MILLIGRAM(S): 0.4 CAPSULE ORAL at 22:03

## 2024-11-28 RX ADMIN — LATANOPROST PF 1 DROP(S): 0.05 SOLUTION/ DROPS OPHTHALMIC at 00:21

## 2024-11-28 RX ADMIN — Medication 25 GRAM(S): at 22:03

## 2024-11-28 RX ADMIN — ENOXAPARIN SODIUM 40 MILLIGRAM(S): 100 INJECTION SUBCUTANEOUS at 22:03

## 2024-11-28 RX ADMIN — FINASTERIDE 5 MILLIGRAM(S): 1 TABLET, FILM COATED ORAL at 12:12

## 2024-11-28 RX ADMIN — Medication 25 GRAM(S): at 00:20

## 2024-11-29 PROCEDURE — 99232 SBSQ HOSP IP/OBS MODERATE 35: CPT

## 2024-11-29 RX ADMIN — Medication 2 TABLET(S): at 21:58

## 2024-11-29 RX ADMIN — Medication 25 GRAM(S): at 21:58

## 2024-11-29 RX ADMIN — Medication 1 APPLICATION(S): at 12:19

## 2024-11-29 RX ADMIN — Medication 81 MILLIGRAM(S): at 12:20

## 2024-11-29 RX ADMIN — LATANOPROST PF 1 DROP(S): 0.05 SOLUTION/ DROPS OPHTHALMIC at 21:57

## 2024-11-29 RX ADMIN — ENOXAPARIN SODIUM 40 MILLIGRAM(S): 100 INJECTION SUBCUTANEOUS at 21:57

## 2024-11-29 RX ADMIN — CARVEDILOL 6.25 MILLIGRAM(S): 3.12 TABLET, FILM COATED ORAL at 18:39

## 2024-11-29 RX ADMIN — TAMSULOSIN HYDROCHLORIDE 0.4 MILLIGRAM(S): 0.4 CAPSULE ORAL at 21:58

## 2024-11-29 RX ADMIN — POLYETHYLENE GLYCOL 3350 17 GRAM(S): 17 POWDER, FOR SOLUTION ORAL at 18:30

## 2024-11-29 RX ADMIN — Medication 25 GRAM(S): at 14:40

## 2024-11-29 RX ADMIN — Medication 25 GRAM(S): at 05:21

## 2024-11-29 RX ADMIN — Medication 50 MILLIGRAM(S): at 21:57

## 2024-11-29 RX ADMIN — CARVEDILOL 6.25 MILLIGRAM(S): 3.12 TABLET, FILM COATED ORAL at 05:21

## 2024-11-29 RX ADMIN — Medication 1 TABLET(S): at 12:20

## 2024-11-29 RX ADMIN — FINASTERIDE 5 MILLIGRAM(S): 1 TABLET, FILM COATED ORAL at 12:20

## 2024-11-30 VITALS — DIASTOLIC BLOOD PRESSURE: 95 MMHG | SYSTOLIC BLOOD PRESSURE: 149 MMHG

## 2024-11-30 LAB
ANION GAP SERPL CALC-SCNC: 14 MMOL/L — SIGNIFICANT CHANGE UP (ref 7–14)
BUN SERPL-MCNC: 13 MG/DL — SIGNIFICANT CHANGE UP (ref 7–23)
CALCIUM SERPL-MCNC: 9.1 MG/DL — SIGNIFICANT CHANGE UP (ref 8.4–10.5)
CHLORIDE SERPL-SCNC: 103 MMOL/L — SIGNIFICANT CHANGE UP (ref 98–107)
CO2 SERPL-SCNC: 22 MMOL/L — SIGNIFICANT CHANGE UP (ref 22–31)
CREAT SERPL-MCNC: 1.11 MG/DL — SIGNIFICANT CHANGE UP (ref 0.5–1.3)
EGFR: 63 ML/MIN/1.73M2 — SIGNIFICANT CHANGE UP
EGFR: 63 ML/MIN/1.73M2 — SIGNIFICANT CHANGE UP
GLUCOSE SERPL-MCNC: 65 MG/DL — LOW (ref 70–99)
HCT VFR BLD CALC: 35.8 % — LOW (ref 39–50)
HGB BLD-MCNC: 10.4 G/DL — LOW (ref 13–17)
MAGNESIUM SERPL-MCNC: 2.2 MG/DL — SIGNIFICANT CHANGE UP (ref 1.6–2.6)
MCHC RBC-ENTMCNC: 25.5 PG — LOW (ref 27–34)
MCHC RBC-ENTMCNC: 29.1 G/DL — LOW (ref 32–36)
MCV RBC AUTO: 87.7 FL — SIGNIFICANT CHANGE UP (ref 80–100)
NRBC # BLD AUTO: 0 K/UL — SIGNIFICANT CHANGE UP (ref 0–0)
NRBC # BLD: 0 /100 WBCS — SIGNIFICANT CHANGE UP (ref 0–0)
NRBC # FLD: 0 K/UL — SIGNIFICANT CHANGE UP (ref 0–0)
NRBC BLD-RTO: 0 /100 WBCS — SIGNIFICANT CHANGE UP (ref 0–0)
PHOSPHATE SERPL-MCNC: 3 MG/DL — SIGNIFICANT CHANGE UP (ref 2.5–4.5)
PLATELET # BLD AUTO: 353 K/UL — SIGNIFICANT CHANGE UP (ref 150–400)
POTASSIUM SERPL-MCNC: 3.7 MMOL/L — SIGNIFICANT CHANGE UP (ref 3.5–5.3)
POTASSIUM SERPL-SCNC: 3.7 MMOL/L — SIGNIFICANT CHANGE UP (ref 3.5–5.3)
RBC # BLD: 4.08 M/UL — LOW (ref 4.2–5.8)
RBC # FLD: 15.7 % — HIGH (ref 10.3–14.5)
SODIUM SERPL-SCNC: 139 MMOL/L — SIGNIFICANT CHANGE UP (ref 135–145)
WBC # BLD: 6.19 K/UL — SIGNIFICANT CHANGE UP (ref 3.8–10.5)
WBC # FLD AUTO: 6.19 K/UL — SIGNIFICANT CHANGE UP (ref 3.8–10.5)

## 2024-11-30 PROCEDURE — 99239 HOSP IP/OBS DSCHRG MGMT >30: CPT

## 2024-11-30 RX ADMIN — POLYETHYLENE GLYCOL 3350 17 GRAM(S): 17 POWDER, FOR SOLUTION ORAL at 05:05

## 2024-11-30 RX ADMIN — CARVEDILOL 6.25 MILLIGRAM(S): 3.12 TABLET, FILM COATED ORAL at 05:02

## 2024-11-30 RX ADMIN — Medication 25 GRAM(S): at 05:05

## 2024-11-30 RX ADMIN — Medication 1 APPLICATION(S): at 12:53

## 2024-12-02 LAB
CULTURE RESULTS: SIGNIFICANT CHANGE UP
SPECIMEN SOURCE: SIGNIFICANT CHANGE UP

## 2024-12-03 ENCOUNTER — APPOINTMENT (OUTPATIENT)
Dept: HOME HEALTH SERVICES | Facility: HOME HEALTH | Age: 88
End: 2024-12-03

## 2024-12-03 VITALS
SYSTOLIC BLOOD PRESSURE: 120 MMHG | HEART RATE: 65 BPM | TEMPERATURE: 98.7 F | RESPIRATION RATE: 16 BRPM | DIASTOLIC BLOOD PRESSURE: 77 MMHG | OXYGEN SATURATION: 99 %

## 2024-12-05 ENCOUNTER — NON-APPOINTMENT (OUTPATIENT)
Age: 88
End: 2024-12-05

## 2024-12-06 ENCOUNTER — APPOINTMENT (OUTPATIENT)
Dept: HOME HEALTH SERVICES | Facility: HOME HEALTH | Age: 88
End: 2024-12-06

## 2024-12-06 ENCOUNTER — TRANSCRIPTION ENCOUNTER (OUTPATIENT)
Age: 88
End: 2024-12-06

## 2024-12-06 DIAGNOSIS — J18.9 PNEUMONIA, UNSPECIFIED ORGANISM: ICD-10-CM

## 2024-12-06 PROCEDURE — 99495 TRANSJ CARE MGMT MOD F2F 14D: CPT

## 2024-12-06 RX ORDER — INHALER, ASSIST DEVICES
SPACER (EA) MISCELLANEOUS
Qty: 1 | Refills: 0 | Status: ACTIVE | COMMUNITY
Start: 2024-12-06 | End: 1900-01-01

## 2024-12-06 RX ORDER — ALBUTEROL SULFATE 90 UG/1
108 (90 BASE) INHALANT RESPIRATORY (INHALATION)
Qty: 1 | Refills: 1 | Status: ACTIVE | COMMUNITY
Start: 2024-12-06 | End: 1900-01-01

## 2024-12-06 RX ORDER — SULFAMETHOXAZOLE AND TRIMETHOPRIM 800; 160 MG/1; MG/1
800-160 TABLET ORAL TWICE DAILY
Qty: 14 | Refills: 0 | Status: ACTIVE | COMMUNITY
Start: 2024-12-06 | End: 1900-01-01

## 2024-12-10 ENCOUNTER — RX RENEWAL (OUTPATIENT)
Age: 88
End: 2024-12-10

## 2024-12-18 DIAGNOSIS — J45.909 UNSPECIFIED ASTHMA, UNCOMPLICATED: ICD-10-CM

## 2024-12-18 RX ORDER — ALBUTEROL SULFATE 2.5 MG/3ML
(2.5 MG/3ML) SOLUTION RESPIRATORY (INHALATION)
Qty: 1 | Refills: 2 | Status: ACTIVE | COMMUNITY
Start: 2024-12-18 | End: 1900-01-01

## 2024-12-20 ENCOUNTER — NON-APPOINTMENT (OUTPATIENT)
Age: 88
End: 2024-12-20

## 2024-12-26 ENCOUNTER — INPATIENT (INPATIENT)
Facility: HOSPITAL | Age: 88
LOS: 13 days | Discharge: HOME HEALTH | End: 2025-01-09
Attending: HOSPITALIST | Admitting: HOSPITALIST
Payer: MEDICARE

## 2024-12-26 VITALS
RESPIRATION RATE: 22 BRPM | OXYGEN SATURATION: 98 % | TEMPERATURE: 98 F | HEART RATE: 83 BPM | SYSTOLIC BLOOD PRESSURE: 174 MMHG | DIASTOLIC BLOOD PRESSURE: 100 MMHG | HEIGHT: 66 IN

## 2024-12-26 DIAGNOSIS — Z95.1 PRESENCE OF AORTOCORONARY BYPASS GRAFT: Chronic | ICD-10-CM

## 2024-12-26 DIAGNOSIS — N40.0 BENIGN PROSTATIC HYPERPLASIA WITHOUT LOWER URINARY TRACT SYMPTOMS: ICD-10-CM

## 2024-12-26 DIAGNOSIS — Z29.9 ENCOUNTER FOR PROPHYLACTIC MEASURES, UNSPECIFIED: ICD-10-CM

## 2024-12-26 DIAGNOSIS — N39.0 URINARY TRACT INFECTION, SITE NOT SPECIFIED: ICD-10-CM

## 2024-12-26 DIAGNOSIS — G93.41 METABOLIC ENCEPHALOPATHY: ICD-10-CM

## 2024-12-26 DIAGNOSIS — Z95.5 PRESENCE OF CORONARY ANGIOPLASTY IMPLANT AND GRAFT: Chronic | ICD-10-CM

## 2024-12-26 DIAGNOSIS — I26.99 OTHER PULMONARY EMBOLISM WITHOUT ACUTE COR PULMONALE: ICD-10-CM

## 2024-12-26 DIAGNOSIS — I65.21 OCCLUSION AND STENOSIS OF RIGHT CAROTID ARTERY: Chronic | ICD-10-CM

## 2024-12-26 DIAGNOSIS — I10 ESSENTIAL (PRIMARY) HYPERTENSION: ICD-10-CM

## 2024-12-26 DIAGNOSIS — N17.9 ACUTE KIDNEY FAILURE, UNSPECIFIED: ICD-10-CM

## 2024-12-26 DIAGNOSIS — I63.9 CEREBRAL INFARCTION, UNSPECIFIED: ICD-10-CM

## 2024-12-26 DIAGNOSIS — I25.10 ATHEROSCLEROTIC HEART DISEASE OF NATIVE CORONARY ARTERY WITHOUT ANGINA PECTORIS: ICD-10-CM

## 2024-12-26 LAB
ADD ON TEST-SPECIMEN IN LAB: SIGNIFICANT CHANGE UP
ALBUMIN SERPL ELPH-MCNC: 3.7 G/DL — SIGNIFICANT CHANGE UP (ref 3.3–5)
ALP SERPL-CCNC: 106 U/L — SIGNIFICANT CHANGE UP (ref 40–120)
ALT FLD-CCNC: 24 U/L — SIGNIFICANT CHANGE UP (ref 4–41)
AMMONIA BLD-MCNC: 14 UMOL/L — SIGNIFICANT CHANGE UP (ref 11–55)
ANION GAP SERPL CALC-SCNC: 16 MMOL/L — HIGH (ref 7–14)
APPEARANCE UR: ABNORMAL
APTT BLD: 27.3 SEC — SIGNIFICANT CHANGE UP (ref 24.5–35.6)
AST SERPL-CCNC: 19 U/L — SIGNIFICANT CHANGE UP (ref 4–40)
BASOPHILS # BLD AUTO: 0.01 K/UL — SIGNIFICANT CHANGE UP (ref 0–0.2)
BASOPHILS NFR BLD AUTO: 0.2 % — SIGNIFICANT CHANGE UP (ref 0–2)
BILIRUB SERPL-MCNC: 0.3 MG/DL — SIGNIFICANT CHANGE UP (ref 0.2–1.2)
BILIRUB UR-MCNC: NEGATIVE — SIGNIFICANT CHANGE UP
BLOOD GAS VENOUS COMPREHENSIVE RESULT: SIGNIFICANT CHANGE UP
BLOOD GAS VENOUS COMPREHENSIVE RESULT: SIGNIFICANT CHANGE UP
BUN SERPL-MCNC: 35 MG/DL — HIGH (ref 7–23)
CALCIUM SERPL-MCNC: 10 MG/DL — SIGNIFICANT CHANGE UP (ref 8.4–10.5)
CHLORIDE SERPL-SCNC: 106 MMOL/L — SIGNIFICANT CHANGE UP (ref 98–107)
CO2 SERPL-SCNC: 23 MMOL/L — SIGNIFICANT CHANGE UP (ref 22–31)
COLOR SPEC: YELLOW — SIGNIFICANT CHANGE UP
CREAT SERPL-MCNC: 1.5 MG/DL — HIGH (ref 0.5–1.3)
DIFF PNL FLD: ABNORMAL
EGFR: 44 ML/MIN/1.73M2 — LOW
EOSINOPHIL # BLD AUTO: 0.01 K/UL — SIGNIFICANT CHANGE UP (ref 0–0.5)
EOSINOPHIL NFR BLD AUTO: 0.2 % — SIGNIFICANT CHANGE UP (ref 0–6)
FLUAV AG NPH QL: SIGNIFICANT CHANGE UP
FLUBV AG NPH QL: SIGNIFICANT CHANGE UP
GLUCOSE SERPL-MCNC: 110 MG/DL — HIGH (ref 70–99)
GLUCOSE UR QL: NEGATIVE MG/DL — SIGNIFICANT CHANGE UP
HCT VFR BLD CALC: 43.8 % — SIGNIFICANT CHANGE UP (ref 39–50)
HGB BLD-MCNC: 12.7 G/DL — LOW (ref 13–17)
IANC: 3.78 K/UL — SIGNIFICANT CHANGE UP (ref 1.8–7.4)
IMM GRANULOCYTES NFR BLD AUTO: 0.2 % — SIGNIFICANT CHANGE UP (ref 0–0.9)
INR BLD: 1.13 RATIO — SIGNIFICANT CHANGE UP (ref 0.85–1.16)
KETONES UR-MCNC: NEGATIVE MG/DL — SIGNIFICANT CHANGE UP
LEUKOCYTE ESTERASE UR-ACNC: ABNORMAL
LYMPHOCYTES # BLD AUTO: 1.63 K/UL — SIGNIFICANT CHANGE UP (ref 1–3.3)
LYMPHOCYTES # BLD AUTO: 27.9 % — SIGNIFICANT CHANGE UP (ref 13–44)
MCHC RBC-ENTMCNC: 24.8 PG — LOW (ref 27–34)
MCHC RBC-ENTMCNC: 29 G/DL — LOW (ref 32–36)
MCV RBC AUTO: 85.5 FL — SIGNIFICANT CHANGE UP (ref 80–100)
MONOCYTES # BLD AUTO: 0.41 K/UL — SIGNIFICANT CHANGE UP (ref 0–0.9)
MONOCYTES NFR BLD AUTO: 7 % — SIGNIFICANT CHANGE UP (ref 2–14)
NEUTROPHILS # BLD AUTO: 3.78 K/UL — SIGNIFICANT CHANGE UP (ref 1.8–7.4)
NEUTROPHILS NFR BLD AUTO: 64.5 % — SIGNIFICANT CHANGE UP (ref 43–77)
NITRITE UR-MCNC: NEGATIVE — SIGNIFICANT CHANGE UP
NRBC # BLD: 0 /100 WBCS — SIGNIFICANT CHANGE UP (ref 0–0)
NRBC # FLD: 0 K/UL — SIGNIFICANT CHANGE UP (ref 0–0)
NT-PROBNP SERPL-SCNC: HIGH PG/ML
PH UR: 5.5 — SIGNIFICANT CHANGE UP (ref 5–8)
PLATELET # BLD AUTO: 354 K/UL — SIGNIFICANT CHANGE UP (ref 150–400)
POTASSIUM SERPL-MCNC: 4.6 MMOL/L — SIGNIFICANT CHANGE UP (ref 3.5–5.3)
POTASSIUM SERPL-SCNC: 4.6 MMOL/L — SIGNIFICANT CHANGE UP (ref 3.5–5.3)
PROT SERPL-MCNC: 9.5 G/DL — HIGH (ref 6–8.3)
PROT UR-MCNC: 100 MG/DL
PROTHROM AB SERPL-ACNC: 13.1 SEC — SIGNIFICANT CHANGE UP (ref 9.9–13.4)
RBC # BLD: 5.12 M/UL — SIGNIFICANT CHANGE UP (ref 4.2–5.8)
RBC # FLD: 17.4 % — HIGH (ref 10.3–14.5)
RSV RNA NPH QL NAA+NON-PROBE: SIGNIFICANT CHANGE UP
SARS-COV-2 RNA SPEC QL NAA+PROBE: SIGNIFICANT CHANGE UP
SODIUM SERPL-SCNC: 145 MMOL/L — SIGNIFICANT CHANGE UP (ref 135–145)
SP GR SPEC: 1.03 — SIGNIFICANT CHANGE UP (ref 1–1.03)
TROPONIN T, HIGH SENSITIVITY RESULT: 75 NG/L — CRITICAL HIGH
TSH SERPL-MCNC: 0.97 UIU/ML — SIGNIFICANT CHANGE UP (ref 0.27–4.2)
UROBILINOGEN FLD QL: 1 MG/DL — SIGNIFICANT CHANGE UP (ref 0.2–1)
WBC # BLD: 5.85 K/UL — SIGNIFICANT CHANGE UP (ref 3.8–10.5)
WBC # FLD AUTO: 5.85 K/UL — SIGNIFICANT CHANGE UP (ref 3.8–10.5)

## 2024-12-26 PROCEDURE — 71045 X-RAY EXAM CHEST 1 VIEW: CPT | Mod: 26

## 2024-12-26 PROCEDURE — 70496 CT ANGIOGRAPHY HEAD: CPT | Mod: 26,MC

## 2024-12-26 PROCEDURE — 70498 CT ANGIOGRAPHY NECK: CPT | Mod: 26,MC

## 2024-12-26 PROCEDURE — 99223 1ST HOSP IP/OBS HIGH 75: CPT | Mod: GC

## 2024-12-26 PROCEDURE — 99285 EMERGENCY DEPT VISIT HI MDM: CPT

## 2024-12-26 RX ORDER — MAG HYDROX/ALUMINUM HYD/SIMETH 200-200-20
30 SUSPENSION, ORAL (FINAL DOSE FORM) ORAL EVERY 4 HOURS
Refills: 0 | Status: DISCONTINUED | OUTPATIENT
Start: 2024-12-26 | End: 2025-01-09

## 2024-12-26 RX ORDER — HEPARIN SODIUM 1000 [USP'U]/ML
2000 INJECTION, SOLUTION INTRAVENOUS; SUBCUTANEOUS EVERY 6 HOURS
Refills: 0 | Status: DISCONTINUED | OUTPATIENT
Start: 2024-12-26 | End: 2024-12-31

## 2024-12-26 RX ORDER — HEPARIN SODIUM 1000 [USP'U]/ML
4500 INJECTION, SOLUTION INTRAVENOUS; SUBCUTANEOUS EVERY 6 HOURS
Refills: 0 | Status: DISCONTINUED | OUTPATIENT
Start: 2024-12-26 | End: 2024-12-31

## 2024-12-26 RX ORDER — GINKGO BILOBA 40 MG
3 CAPSULE ORAL AT BEDTIME
Refills: 0 | Status: DISCONTINUED | OUTPATIENT
Start: 2024-12-26 | End: 2025-01-09

## 2024-12-26 RX ORDER — POLYETHYLENE GLYCOL 3350 17 G/DOSE
17 POWDER (GRAM) ORAL
Refills: 0 | Status: DISCONTINUED | OUTPATIENT
Start: 2024-12-26 | End: 2025-01-09

## 2024-12-26 RX ORDER — ASPIRIN 81 MG
81 TABLET, DELAYED RELEASE (ENTERIC COATED) ORAL DAILY
Refills: 0 | Status: DISCONTINUED | OUTPATIENT
Start: 2024-12-26 | End: 2024-12-31

## 2024-12-26 RX ORDER — HEPARIN SODIUM 1000 [USP'U]/ML
4500 INJECTION, SOLUTION INTRAVENOUS; SUBCUTANEOUS ONCE
Refills: 0 | Status: COMPLETED | OUTPATIENT
Start: 2024-12-26 | End: 2024-12-26

## 2024-12-26 RX ORDER — HEPARIN SODIUM 1000 [USP'U]/ML
INJECTION, SOLUTION INTRAVENOUS; SUBCUTANEOUS
Qty: 25000 | Refills: 0 | Status: DISCONTINUED | OUTPATIENT
Start: 2024-12-26 | End: 2024-12-31

## 2024-12-26 RX ORDER — FINASTERIDE 5 MG
5 TABLET ORAL DAILY
Refills: 0 | Status: DISCONTINUED | OUTPATIENT
Start: 2024-12-26 | End: 2025-01-09

## 2024-12-26 RX ORDER — TRAZODONE HCL 100 MG
1 TABLET ORAL
Refills: 0 | DISCHARGE

## 2024-12-26 RX ORDER — ONDANSETRON 4 MG/1
4 TABLET ORAL EVERY 8 HOURS
Refills: 0 | Status: DISCONTINUED | OUTPATIENT
Start: 2024-12-26 | End: 2025-01-09

## 2024-12-26 RX ORDER — TAMSULOSIN HYDROCHLORIDE 0.4 MG/1
0.4 CAPSULE ORAL AT BEDTIME
Refills: 0 | Status: DISCONTINUED | OUTPATIENT
Start: 2024-12-26 | End: 2025-01-09

## 2024-12-26 RX ORDER — CEFTRIAXONE SODIUM 1 G/1
1000 INJECTION, POWDER, FOR SOLUTION INTRAMUSCULAR; INTRAVENOUS ONCE
Refills: 0 | Status: COMPLETED | OUTPATIENT
Start: 2024-12-26 | End: 2024-12-26

## 2024-12-26 RX ORDER — ACETAMINOPHEN 80 MG/.8ML
650 SOLUTION/ DROPS ORAL EVERY 6 HOURS
Refills: 0 | Status: DISCONTINUED | OUTPATIENT
Start: 2024-12-26 | End: 2025-01-09

## 2024-12-26 RX ORDER — B COMPLEX, C NO.20/FOLIC ACID 1 MG
1 CAPSULE ORAL DAILY
Refills: 0 | Status: DISCONTINUED | OUTPATIENT
Start: 2024-12-26 | End: 2025-01-09

## 2024-12-26 RX ORDER — LATANOPROST PF 0.05 MG/ML
1 SOLUTION/ DROPS OPHTHALMIC
Refills: 0 | DISCHARGE

## 2024-12-26 RX ORDER — CARVEDILOL 25 MG/1
6.25 TABLET, FILM COATED ORAL EVERY 12 HOURS
Refills: 0 | Status: DISCONTINUED | OUTPATIENT
Start: 2024-12-26 | End: 2024-12-27

## 2024-12-26 RX ORDER — CARVEDILOL 3.12 MG/1
1 TABLET, FILM COATED ORAL
Refills: 0 | DISCHARGE

## 2024-12-26 RX ADMIN — CEFTRIAXONE SODIUM 100 MILLIGRAM(S): 1 INJECTION, POWDER, FOR SOLUTION INTRAMUSCULAR; INTRAVENOUS at 19:50

## 2024-12-26 RX ADMIN — HEPARIN SODIUM 4500 UNIT(S): 1000 INJECTION, SOLUTION INTRAVENOUS; SUBCUTANEOUS at 23:57

## 2024-12-26 RX ADMIN — HEPARIN SODIUM 1100 UNIT(S)/HR: 1000 INJECTION, SOLUTION INTRAVENOUS; SUBCUTANEOUS at 23:52

## 2024-12-26 NOTE — ED ADULT NURSE REASSESSMENT NOTE - NS ED NURSE REASSESS COMMENT FT1
ED focused US guided PIV placement by Registered Nurse.      Indication - poor access.      Findings: compressible Right basilic vein.  Using direct US guidance, under clean conditions, a long 20G right peripheral catheter introduced into vessel.  US performed after procedure, functional catheter in vein, no complications.     Impression:  successful US guided right basilic 20G PIV placement.
pt laying semifowlers in stretcher , no acute distress noted. Pt states, "I need to spit". pt coughing up large amounts of thick clear to yellowish sputum onto his gown. Pt keeps his eyes closed , not following verbal commands when instructed. BP elevated 170/103, MD made aware of BP . New new orders at this time.
20g IV placed to left upper arm by RADHA Cox; Labs collected and sent. Pending further MD order. RR even, unlabored. Safety maintained.
Pt more verbal during lab draw. Pt asking to eat. Pt remains lethargic. Safety maintained.
pt remains a&ox0, nonverbal, but arousable to voice and touch with attempted arm and eye movements. RR even, unlabored; no signs of distress. Safety maintained. Pending imaging.
Consent (Lip)/Introductory Paragraph: The rationale for Mohs was explained to the patient and consent was obtained. The risks, benefits and alternatives to therapy were discussed in detail. Specifically, the risks of lip deformity, changes in the oral aperture, infection, scarring, bleeding, prolonged wound healing, incomplete removal, allergy to anesthesia, nerve injury and recurrence were addressed. Prior to the procedure, the treatment site was clearly identified and confirmed by the patient. All components of Universal Protocol/PAUSE Rule completed.

## 2024-12-26 NOTE — H&P ADULT - NSHPLABSRESULTS_GEN_ALL_CORE
.  LABS:                         12.7   5.85  )-----------( 354      ( 26 Dec 2024 15:50 )             43.8         145  |  106  |  35[H]  ----------------------------<  110[H]  4.6   |  23  |  1.50[H]    Ca    10.0      26 Dec 2024 15:50    TPro  9.5[H]  /  Alb  3.7  /  TBili  0.3  /  DBili  x   /  AST  19  /  ALT  24  /  AlkPhos  106      PT/INR - ( 26 Dec 2024 15:54 )   PT: 13.1 sec;   INR: 1.13 ratio         PTT - ( 26 Dec 2024 15:54 )  PTT:27.3 sec  Urinalysis Basic - ( 26 Dec 2024 17:45 )    Color: Yellow / Appearance: Cloudy / S.028 / pH: x  Gluc: x / Ketone: Negative mg/dL  / Bili: Negative / Urobili: 1.0 mg/dL   Blood: x / Protein: 100 mg/dL / Nitrite: Negative   Leuk Esterase: Large / RBC: 3 /HPF /  /HPF   Sq Epi: x / Non Sq Epi: 1 /HPF / Bacteria: Many /HPF        Lactate, Blood: 1.5 mmol/L ( @ 18:16)

## 2024-12-26 NOTE — ED PROVIDER NOTE - OBJECTIVE STATEMENT
88 y/o male with pmhx of dementia, PAD, DVT, BPH, right facial droop at baseline (no hx of CVA or seizures), presents to ED for right facial droop worsening and slurred speech x 2 days. History obtained from daughter and son via phone. Pt with aide at bedside. Son states pt has had episodes in the past where his body freezes, he stares into space, no jerking or incontinence. Had episode yesterday morning with the same and developed worsening right facial droop and slurred speech. States pt was at [facility] and no hx of stroke or seizure diagnosed, has right facial droop at baseline. No recent illness. Pt noted to be hypoxic, placed on NC and sating 94%. No hx of oxygen use at home.

## 2024-12-26 NOTE — H&P ADULT - PROBLEM SELECTOR PLAN 3
- CTA head and neck finding b/l pulmonary embolism, pt on3L NC sating 95%    Plan:  - heparin ggt, f/u Pro BNP, trops, EKG with NSR and RBBB  - f/u CTA pe protocol - CTA head and neck finding b/l pulmonary embolism, pt on3L NC sating 95%    Plan:  - heparin ggt, f/u Pro BNP, trops, EKG with NSR and RBBB  - f/u CTA pe protocol  - TTE

## 2024-12-26 NOTE — CONSULT NOTE ADULT - SUBJECTIVE AND OBJECTIVE BOX
Neurology - Consult Note    -  Spectra: [id number] ([facility]), [phone] ([facility])  -    HPI: Patient SANDEE WILSON is a 89y (1935) /man with a PMHx significant for pmhx of dementia, CAD s/p CABG and stents, prior DVT, and BPH with chronic indwelling carter catheter, PAD, right facial droop at baseline (no hx of CVA), presents to ED for right facial droop worsening from baseline and slurred speech x 2 days. History obtained from daughter and son via phone. Pt with aide at bedside. Son states pt has had episodes in the past where his body freezes, he stares into space, no jerking or incontinence. Had episode yesterday morning with the same and developed worsening right facial droop and slurred speech. States pt was at [facility] and no hx of stroke or seizure diagnosed, has right facial droop at baseline. No recent illness. Pt noted to be hypoxic, placed on NC and sating 94%. No hx of oxygen use at home. concern for possible CVA vs seizure vs pneumonia vs viral syndrome. ekg with right BBB. plan to check labs, blood cultures, CTA head and neck, viral swab, cxr. Neurology consulted. Upon chart review, per son, pt is typically A+Ox1-2 and has a tendency to sundown, especially when he is in the hospital.      Upon workup, patient has elevated lactate. Additionally, patient has UTI treated with ceftriaxone. The patient is not on AC per daughter. Pt admitted to hospitalist service.     NIH 6  LKW 12/25 AM  MRS 3    Review of Systems:    Unable to assess due to patients status     Allergies:  ramipril (Angioedema)      PMHx/PSHx/Family Hx: As above, otherwise see below   CAD (coronary artery disease)    Essential hypertension    HLD (hyperlipidemia)    BPH (benign prostatic hyperplasia)    HTN (hypertension)    Dementia    CAD (coronary artery disease)      Social Hx:  No current use of tobacco, alcohol, or illicit drugs    Medications:  MEDICATIONS  (STANDING):  cefTRIAXone   IVPB 1000 milliGRAM(s) IV Intermittent once    MEDICATIONS  (PRN):      Vitals:  T(C): 36.4 (12-26-24 @ 17:00), Max: 36.7 (12-26-24 @ 12:20)  HR: 84 (12-26-24 @ 17:00) (83 - 91)  BP: 184/109 (12-26-24 @ 17:00) (154/119 - 184/109)  RR: 19 (12-26-24 @ 17:00) (19 - 28)  SpO2: 94% (12-26-24 @ 17:00) (94% - 98%)    Physical Examination:     Constitutional: laying in stretcher, sleeping, no acute distress however slow to respond to verbal and tactile stimuli   Cardiovascular: no swelling, warm-to-touch    Neurological Examination:    - Mental Status: Eyes closed, but open upon verbal and tactile stimuli. Minimally attends to examiner; oriented to person only but not age, month, year, location, and situation; speech is in phrases and slurred, and pt intermittently follows 1-step commands;       - Cranial Nerves:  II: Visual fields are difficult to assess as pt's eyes are closed. However, blink to threat decreased on Left  III, IV, VI: Extraocular movements are intact without nystagmus  VII: Face right facial palsy mild  VIII: Hearing is intact to conversation    - Motor/Strength Testing:  - No drifts x 4 extremities.                                   Right           Left  Deltoid                    4                  4  Biceps                      4               4  Triceps                     4              4    Bilateral lower extremities 4/5     - Normal muscle bulk and tone throughout.    - Reflexes:   Bicep (C5/C6):                  R 3+ --- L 3+   Brachioradialis (C5/C6) :   R 3+ --- L 3+   Patella (L3/L4) :                 R 3+ --- L 3+  Ankle (S1) :                       R 3+--- L 3+     - Sensory: Intact throughout to noxious stimuli x4 extremities   - Coordination: unable to assess  - Gait: unable to assess     Labs:                        12.7   5.85  )-----------( 354      ( 26 Dec 2024 15:50 )             43.8     12-26    145  |  106  |  35[H]  ----------------------------<  110[H]  4.6   |  23  |  1.50[H]    Ca    10.0      26 Dec 2024 15:50    TPro  9.5[H]  /  Alb  3.7  /  TBili  0.3  /  DBili  x   /  AST  19  /  ALT  24  /  AlkPhos  106  12-26    CAPILLARY BLOOD GLUCOSE      POCT Blood Glucose.: 110 mg/dL (26 Dec 2024 12:32)    LIVER FUNCTIONS - ( 26 Dec 2024 15:50 )  Alb: 3.7 g/dL / Pro: 9.5 g/dL / ALK PHOS: 106 U/L / ALT: 24 U/L / AST: 19 U/L / GGT: x             PT/INR - ( 26 Dec 2024 15:54 )   PT: 13.1 sec;   INR: 1.13 ratio         PTT - ( 26 Dec 2024 15:54 )  PTT:27.3 sec  CSF:                  Radiology:  CTA Head and NECK    IMPRESSION:    Limited study evaluation due to patient positioning    CT HEAD:  No acute intracranial hemorrhage, mass effect, or midline shift. No   pathologic enhancement or evidence of venous sinus thrombosis.    CTA NECK:  Bilateral, partially visualized pulmonary emboli described above,   consider dedicated CTA of chest to assess for complete clot burden.  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    No significant carotid stenosis      CTA HEAD:  No large vessel occlusion..

## 2024-12-26 NOTE — ED PROVIDER NOTE - ATTENDING CONTRIBUTION TO CARE
I performed a history and physical exam of the patient and discussed their management with the resident and /or advanced care provider. I reviewed the resident and /or ACP's note and agree with the documented findings and plan of care. My medical decision making and observations are found above.  Lungs clear, abd soft, facial doop

## 2024-12-26 NOTE — RAPID RESPONSE TEAM SUMMARY - NSSITUATIONBACKGROUNDRRT_GEN_ALL_CORE
89 year old male with PMHx significant for pmhx of dementia, CAD s/p CABG and stents, prior DVT, and BPH with chronic indwelling carter catheter, PAD, right facial droop at baseline (no hx of CVA), presents to ED for right facial droop worsening from baseline and slurred speech x 2 days.concern for TME vs CVA vs seizure.     RRT called for change in mental status. On arrival, patient is agitated with nurses attempting to check his finger stick. Vitals appropriate. Labs and imaging reviewed. Finger stick was unable to be obtained as patient was refusing. Physical examination appears to be consist with baseline. At this time, I do not think there are any acute changes occurring. RRT ended.

## 2024-12-26 NOTE — H&P ADULT - PROBLEM SELECTOR PLAN 6
- A&OX1-2 at baseline, A&Ox person and place currently  - minimal ambulation at baseline with assistance.

## 2024-12-26 NOTE — H&P ADULT - PROBLEM SELECTOR PLAN 2
- UA+, + lactate  - s/p CTX in the ED    Plan:  - f/u procal, cultures, treat with CTX daily - UA+, + lactate  - s/p CTX in the ED    Plan:  - f/u procal, cultures, prior cultures with klebsiella + staph aureus  - start zosyn daily, narrow based on sensitivites

## 2024-12-26 NOTE — ED ADULT NURSE NOTE - OBJECTIVE STATEMENT
Patient received 19, accompanied by home health aid. As per aide pt is baseline, a&ox1, and verbal. Pts aide states "around 9am he started staring off and not answering. Since then his face on the right seems more droopy." Unable to complete neuro assessment  due to pts condition. No code stroke activated in triage.  pt appears lethargic, minimally responsive to voice and touch with attempted eye opening. Pt arrives with chronic, suprapubic Schmidt catheter, last changes in November as per aide. hxm CAD, HTN, Dementia, encephalopathy (as per aide). Pt placed on cardiac monitor. Pt tachypneic RR 25-28. Safety maintained. Pending MD mandujano.

## 2024-12-26 NOTE — H&P ADULT - PROBLEM SELECTOR PLAN 1
- pt A&Ox1 as per chart review but has tendency to sundown while in hospital, found to be slurred speech and worsening facial droop x2 days  - pt VSS in ED, lactate of 4.1--> 2.1, UA +  - Neurology consulted for possible CVA, CTA head+neck without large vessel disease    Plan:  - r/o toxic metabolic etiology, treat for UTI with CTX daily, f/u cultures, procal, full RVP  - obtain MRI brain as per neuro recs  - pending TTE eval for shunt  - Video EEG for seizures  - aspiration precautions, seizure precautions - pt A&Ox1 as per chart review but has tendency to sundown while in hospital, found to be slurred speech and worsening facial droop x2 days  - pt VSS in ED, lactate of 4.1--> 2.1, UA +  - Neurology consulted for possible CVA, CTA head+neck without large vessel disease    Plan:  - r/o toxic metabolic etiology, treat for UTI with CTX daily, f/u cultures, procal, full RVP  - obtain MRI brain as per neuro recs  - pending TTE eval   - Video EEG for seizures  - aspiration precautions, seizure precautions

## 2024-12-26 NOTE — H&P ADULT - ASSESSMENT
th a PMHx significant for pmhx of dementia, CAD s/p CABG and stents, prior DVT, and BPH with chronic indwelling carter catheter, PAD, right facial droop at baseline (no hx of CVA), presents to ED for right facial droop worsening from baseline and slurred speech x 2 days.concern for TME vs CVA vs seizure.  89M PMHx significant for pmhx of dementia, CAD s/p CABG and stents, prior DVT, and BPH with chronic indwelling carter catheter, PAD, right facial droop at baseline (no hx of CVA), presents to ED for right facial droop worsening from baseline and slurred speech x 2 days concern for TME vs CVA vs seizure.

## 2024-12-26 NOTE — ED ADULT NURSE NOTE - NSFALLRISKINTERV_ED_ALL_ED
Assistance OOB with selected safe patient handling equipment if applicable/Assistance with ambulation/Communicate fall risk and risk factors to all staff, patient, and family/Monitor gait and stability/Monitor for mental status changes and reorient to person, place, and time, as needed/Provide patient with walking aids/Provide visual cue: yellow wristband, yellow gown, etc/Reinforce activity limits and safety measures with patient and family/Toileting schedule using arm’s reach rule for commode and bathroom/Use of alarms - bed, stretcher, chair and/or video monitoring/Call bell, personal items and telephone in reach/Instruct patient to call for assistance before getting out of bed/chair/stretcher/Non-slip footwear applied when patient is off stretcher/Jerome to call system/Physically safe environment - no spills, clutter or unnecessary equipment/Purposeful Proactive Rounding/Room/bathroom lighting operational, light cord in reach

## 2024-12-26 NOTE — ED PROVIDER NOTE - ATTENDING APP SHARED VISIT CONTRIBUTION OF CARE
I performed a history and physical exam of the patient and discussed their management with the resident and /or advanced care provider. I reviewed the resident and /or ACP's note and agree with the documented findings and plan of care. My medical decision making and observations are found above.  Lungs clear, abd soft, facial droop

## 2024-12-26 NOTE — H&P ADULT - NSHPPHYSICALEXAM_GEN_ALL_CORE
Gen: NAD, AOx1  Head: NCAT  HEENT: PERRL, oral mucosa dry  normal conjunctiva  Lung: CTAB, no respiratory distress  CV:  + holosystolic murmur   Abd: soft, NTND, no CVA tenderness  MSK: No edema, no visible deformities  Neuro: No focal neurologic deficits, mild r sided facial droop

## 2024-12-26 NOTE — ED PROVIDER NOTE - PROGRESS NOTE DETAILS
Eugene STEWARD, PGY-2;  89 y/o male with pmhx of dementia, PAD, DVT, BPH, right facial droop at baseline (no hx of CVA or seizures), presents to ED for right facial droop worsening and slurred speech x 2 days. Labs indicate slight LIZZETTE. Patient has elevated lactate. Additionally, patient has UTI treated with ceftriaxone. The patient is not on AC per daughter. Will start on heparin and admit to hospitalist. Eugene STEWARD, PGY-2;  Patient endorsed to hospitalist, will order TTE, awaiting weight to order heparin. Family was updated on plan. Methotrexate Counseling:  Patient counseled regarding adverse effects of methotrexate including but not limited to nausea, vomiting, abnormalities in liver function tests. Patients may develop mouth sores, rash, diarrhea, and abnormalities in blood counts. The patient understands that monitoring is required including LFT's and blood counts.  There is a rare possibility of scarring of the liver and lung problems that can occur when taking methotrexate. Persistent nausea, loss of appetite, pale stools, dark urine, cough, and shortness of breath should be reported immediately. Patient advised to discontinue methotrexate treatment at least three months before attempting to become pregnant.  I discussed the need for folate supplements while taking methotrexate.  These supplements can decrease side effects during methotrexate treatment. The patient verbalized understanding of the proper use and possible adverse effects of methotrexate.  All of the patient's questions and concerns were addressed.

## 2024-12-26 NOTE — ED PROVIDER NOTE - NEUROLOGICAL, MLM
AxOx0, sleeping, eyes closed. Responsive to sternal rub mumbling words. Moving arms and legs, follows some commands.

## 2024-12-26 NOTE — H&P ADULT - ATTENDING COMMENTS
89M with history of dementia (AOx1-2 baseline), CAD s/p CABG and stents, prior DVT (not on AC), BPH with chronic indwelling carter, PAD, and chronic right facial droop at baseline of unknown etiology (no known h/o CVA) presenting for worsening of right facial droop with slurred speech x 2 days with multiple "staring" episodes. CTH and CTA H/N unremarkable. Given localizing symptoms, concerning for stroke vs seizures vs toxic metabolic encephalopathy secondary to UTI and PE. Neuro eval as above with brain MRI, vEEG, and TTE. Course complicated by submassive bilateral pulmonary emboli incidentally caught on CT neck, pending dedicated CTA chest, with acute hypoxic respiratory failure requiring supplemental O2, and positive biomarkers. On heparin gtt. Hemodynamically stable at this time. STAT TTE in AM. Repeat biomarkers. Monitor on telemetry, VS and neuro checks q4h. Zosyn for UTI. 89M with history of dementia (AOx1-2 baseline), CAD s/p CABG and stents, prior DVT (not on AC), BPH with chronic indwelling carter, PAD, and chronic right facial droop at baseline of unknown etiology (no known h/o CVA) presenting for worsening of right facial droop with slurred speech x 2 days with multiple "staring" episodes. CTH and CTA H/N unremarkable. Given localizing symptoms, concerning for stroke vs seizures vs toxic metabolic encephalopathy secondary to UTI and PE. Neuro eval as above with brain MRI, vEEG, and TTE. Course complicated by submassive bilateral pulmonary emboli incidentally caught on CT neck, pending dedicated CTA chest, with acute hypoxic respiratory failure requiring supplemental O2, and positive biomarkers. On heparin gtt. Hemodynamically stable at this time. STAT TTE in AM. Repeat biomarkers. Monitor on telemetry, VS and neuro checks q4h. Low threshold for PERT evaluation if becomes hemodynamically unstable or uptrending biomarkers. Zosyn for UTI.

## 2024-12-26 NOTE — H&P ADULT - HISTORY OF PRESENT ILLNESS
89 y.o man with a PMHx significant for pmhx of dementia, CAD s/p CABG and stents, prior DVT, and BPH with chronic indwelling Schmidt catheter, PAD, right facial droop at baseline (no hx of CVA), presents to ED for right facial droop worsening from baseline and slurred speech x 2 days. As per chart review, pt brought in by Son for episodes of body 'freezing' and staring into space. He had an episode yesterday morning and noticed worsening right sided facial droop + slowed speech. No recent fevers, sick contacts, lives at home with an aid.     ED vitals: BP 170s- 190s systolic  Hypoxic and placed on NC 94%  CTA head and neck with no large vessel occlusion but CT showing b/l partially visualized PE, started on heparin drip  Pt found to have an elevated lactate of 4.1--> 2.1, treated with CTX for UTI

## 2024-12-26 NOTE — H&P ADULT - PROBLEM SELECTOR PLAN 4
- cr of 1.5 baseline 1.11  - etiology likely pre-renal vs ATN    Plan:  - urine studies  - IVF 50cc/ hr

## 2024-12-26 NOTE — ED ADULT TRIAGE NOTE - CHIEF COMPLAINT QUOTE
Pt presents to ED via EMS from home with c/o altered mental status. Pt has hx of encephalopathy is A&OX1 at baseline. Pt family reports that at around 9am pt had episode of starring off then had worsened R sided facial droop, but now is back to baseline. Dr. Pena called for stroke eval advised not to activate at this time. Pt arrives with #20g IV to R wrist.

## 2024-12-26 NOTE — ED PROVIDER NOTE - CLINICAL SUMMARY MEDICAL DECISION MAKING FREE TEXT BOX
88 y/o male with pmhx of dementia, PAD, DVT, BPH, right facial droop at baseline (no hx of CVA), presents to ED for right facial droop worsening from baseline and slurred speech x 2 days. History obtained from daughter and son via phone. Pt with aide at bedside. Son states pt has had episodes in the past where his body freezes, he stares into space, no jerking or incontinence. Had episode yesterday morning with the same and developed worsening right facial droop and slurred speech. States pt was at [facility] and no hx of stroke or seizure diagnosed, has right facial droop at baseline. No recent illness. Pt noted to be hypoxic, placed on NC and sating 94%. No hx of oxygen use at home. concern for possible CVA vs seizure vs pneumonia vs viral syndrome. plan to check labs, blood cultures, CTA head and neck, viral swab, cxr. 89 y/o male with pmhx of dementia, PAD, DVT, BPH, right facial droop at baseline (no hx of CVA), presents to ED for right facial droop worsening from baseline and slurred speech x 2 days. History obtained from daughter and son via phone. Pt with aide at bedside. Son states pt has had episodes in the past where his body freezes, he stares into space, no jerking or incontinence. Had episode yesterday morning with the same and developed worsening right facial droop and slurred speech. States pt was at [facility] and no hx of stroke or seizure diagnosed, has right facial droop at baseline. No recent illness. Pt noted to be hypoxic, placed on NC and sating 94%. No hx of oxygen use at home. concern for possible CVA vs seizure vs pneumonia vs viral syndrome. ekg with right BBB. plan to check labs, blood cultures, CTA head and neck, viral swab, cxr. 89 y/o male with pmhx of dementia, PAD, DVT, BPH, right facial droop at baseline (no hx of CVA), presents to ED for right facial droop worsening from baseline and slurred speech x 2 days. History obtained from daughter and son via phone. Pt with aide at bedside. Son states pt has had episodes in the past where his body freezes, he stares into space, no jerking or incontinence. Had episode yesterday morning with the same and developed worsening right facial droop and slurred speech. States pt was at [facility] and no hx of stroke or seizure diagnosed, has right facial droop at baseline. No recent illness. Pt noted to be hypoxic, placed on NC and sating 94%. No hx of oxygen use at home. concern for possible CVA vs seizure vs pneumonia vs viral syndrome. ekg with right BBB. plan to check labs, blood cultures, CTA head and neck, viral swab, cxr.    Denisha: Patient is an 89 with a history of dementia peripheral vascular disease who was noted to have a right facial droop which had worsened over the last few days.  Patient's oxygen saturation was 94% on nasal cannula.  Will get CT of the head and neck will look for causes of the O2 sat with chest x-ray blood cultures and reassess.

## 2024-12-26 NOTE — ED PROVIDER NOTE - NSICDXPASTMEDICALHX_GEN_ALL_CORE_FT
PAST MEDICAL HISTORY:  BPH (benign prostatic hyperplasia)     CAD (coronary artery disease)     CAD (coronary artery disease)     Dementia     Essential hypertension     HLD (hyperlipidemia)     HTN (hypertension)

## 2024-12-26 NOTE — CONSULT NOTE ADULT - ASSESSMENT
SANDEE WILSON is a 89y (1935) /man with a PMHx significant for pmhx of dementia, CAD s/p CABG and stents, prior DVT, and BPH with chronic indwelling carter catheter, PAD, right facial droop at baseline (no hx of CVA), presents to ED for right facial droop worsening from baseline and slurred speech x 2 days. History obtained from daughter and son via phone. Pt with aide at bedside. Son states pt has had episodes in the past where his body freezes, he stares into space, no jerking or incontinence. Had episode yesterday morning with the same and developed worsening right facial droop and slurred speech. States pt was at [facility] and no hx of stroke or seizure diagnosed, has right facial droop at baseline. No recent illness. Pt noted to be hypoxic, placed on NC and sating 94%. No hx of oxygen use at home. concern for possible CVA vs seizure vs pneumonia vs viral syndrome. ekg with right BBB. plan to check labs, blood cultures, CTA head and neck, viral swab, cxr. Neurology consulted. Upon chart review, per son, pt is typically A+Ox1-2 and has a tendency to sundown, especially when he is in the hospital.      Upon workup, patient has elevated lactate. Additionally, patient has UTI treated with ceftriaxone. The patient is not on AC per daughter. Pt admitted to hospitalist service.     Impression  1) Increased lethargy, worsening right facial droop from baseline and dysarthria localizing to acute brain dysfunction. Etiology recrudescence vs seizures vs toxic metabolic given UTI vs acute ischemic stroke.   2) Incidental finding of pulmonary embolisms on CT imaging, pending dedicated CT Chest imaging    Recommendations  [] Incidental imaging finding of pulmonary emboli, heparin gtt as per primary  [] Telemetry monitoring  [x] CT Head, CTA Head and Neck reviewed  [] 24 hour VEEG to evaluate for focal slowing, epileptiform discharges, or seizures  [] Toxic Metabolic workup   [] Treat UTI given likely culprit triggering seizure    [] MRI brain w/o con   [] Continue empiric antibiotics as per primary team  [] Lorazepam 2mg IV PRN for seizure activity lasting >3-5mins, >2x/hr, >3x/day, or if GTC , repeat after 1 minute (max dose 0.1 mg/kg)   [] Neurochecks/VS q4h  [] Seizure, fall and aspiration precautions. Avoid sleep deprivation.   [] Please note: if patient has a convulsion, please document length of episode, specifically what patient was doing paying attention to eye opening vs closure, gaze deviation, shaking of extremities, tongue bite, urinary incontinence, any derangement of vital signs. Generalized motor seizures can have dilated and unreactive pupils, absent oculocephalic reflexes (no dolls eyes), and open eyelids (99% of cases). Head turning from sided to side, pelvic thrusting, bicycling movements, hand waving are NOT manifestations of generalized motor seizures.  [] HgbA1c, Lipid Panel  [] TTE w/o bubble   [] PT/OT/SLP  [] Dysphagia Screen  [] DVT prophylaxis as per primary    Case to be seen by general neurology team in the am. Pending chart attestation.  SANDEE WILOSN is a 89y (1935) /man with a PMHx significant for pmhx of dementia, CAD s/p CABG and stents, prior DVT, and BPH with chronic indwelling carter catheter, PAD, right facial droop at baseline (no hx of CVA), presents to ED for right facial droop worsening from baseline and slurred speech x 2 days. History obtained from daughter and son via phone. Pt with aide at bedside. Son states pt has had episodes in the past where his body freezes, he stares into space, no jerking or incontinence. Had episode yesterday morning with the same and developed worsening right facial droop and slurred speech. States pt was at [facility] and no hx of stroke or seizure diagnosed, has right facial droop at baseline. No recent illness. Pt noted to be hypoxic, placed on NC and sating 94%. No hx of oxygen use at home. concern for possible CVA vs seizure vs pneumonia vs viral syndrome. ekg with right BBB. plan to check labs, blood cultures, CTA head and neck, viral swab, cxr. Neurology consulted. Upon chart review, per son, pt is typically A+Ox1-2 and has a tendency to sundown, especially when he is in the hospital.      Upon workup, patient has elevated lactate. Additionally, patient has UTI treated with ceftriaxone. The patient is not on AC per daughter. Pt admitted to hospitalist service.     Impression  1) Increased lethargy, worsening right facial droop from baseline and dysarthria localizing to acute brain dysfunction. Etiology recrudescence vs seizures vs toxic metabolic given UTI vs acute ischemic stroke.   2) Finding of pulmonary embolisms on CT imaging given hypoxia, pending dedicated CT Chest imaging    Recommendations  [] Incidental imaging finding of pulmonary emboli, heparin gtt as per primary  [] Telemetry monitoring  [x] CT Head, CTA Head and Neck reviewed  [] 24 hour VEEG to evaluate for focal slowing, epileptiform discharges, or seizures  [] Toxic Metabolic workup   [] Treat UTI given likely culprit triggering seizure    [] MRI brain w/o con   [] Continue empiric antibiotics as per primary team  [] Lorazepam 2mg IV PRN for seizure activity lasting >3-5mins, >2x/hr, >3x/day, or if GTC , repeat after 1 minute (max dose 0.1 mg/kg)   [] Neurochecks/VS q4h  [] Seizure, fall and aspiration precautions. Avoid sleep deprivation.   [] Please note: if patient has a convulsion, please document length of episode, specifically what patient was doing paying attention to eye opening vs closure, gaze deviation, shaking of extremities, tongue bite, urinary incontinence, any derangement of vital signs. Generalized motor seizures can have dilated and unreactive pupils, absent oculocephalic reflexes (no dolls eyes), and open eyelids (99% of cases). Head turning from sided to side, pelvic thrusting, bicycling movements, hand waving are NOT manifestations of generalized motor seizures.  [] HgbA1c, Lipid Panel  [] TTE w/o bubble   [] PT/OT/SLP  [] Dysphagia Screen  [] DVT prophylaxis as per primary    Case to be seen by general neurology team in the am. Pending chart attestation.

## 2024-12-27 ENCOUNTER — NON-APPOINTMENT (OUTPATIENT)
Age: 88
End: 2024-12-27

## 2024-12-27 ENCOUNTER — RESULT REVIEW (OUTPATIENT)
Age: 88
End: 2024-12-27

## 2024-12-27 LAB
A1C WITH ESTIMATED AVERAGE GLUCOSE RESULT: 5.4 % — SIGNIFICANT CHANGE UP (ref 4–5.6)
ADD ON TEST-SPECIMEN IN LAB: SIGNIFICANT CHANGE UP
ALBUMIN SERPL ELPH-MCNC: 3.3 G/DL — SIGNIFICANT CHANGE UP (ref 3.3–5)
ALP SERPL-CCNC: 101 U/L — SIGNIFICANT CHANGE UP (ref 40–120)
ALT FLD-CCNC: 21 U/L — SIGNIFICANT CHANGE UP (ref 4–41)
ANION GAP SERPL CALC-SCNC: 17 MMOL/L — HIGH (ref 7–14)
APTT BLD: 75.7 SEC — HIGH (ref 24.5–35.6)
APTT BLD: 89.8 SEC — HIGH (ref 24.5–35.6)
APTT BLD: 99.4 SEC — HIGH (ref 24.5–35.6)
AST SERPL-CCNC: 21 U/L — SIGNIFICANT CHANGE UP (ref 4–40)
BILIRUB SERPL-MCNC: 0.3 MG/DL — SIGNIFICANT CHANGE UP (ref 0.2–1.2)
BUN SERPL-MCNC: 30 MG/DL — HIGH (ref 7–23)
CALCIUM SERPL-MCNC: 9.7 MG/DL — SIGNIFICANT CHANGE UP (ref 8.4–10.5)
CHLORIDE SERPL-SCNC: 106 MMOL/L — SIGNIFICANT CHANGE UP (ref 98–107)
CHOLEST SERPL-MCNC: 169 MG/DL — SIGNIFICANT CHANGE UP
CO2 SERPL-SCNC: 21 MMOL/L — LOW (ref 22–31)
CREAT ?TM UR-MCNC: 108 MG/DL — SIGNIFICANT CHANGE UP
CREAT SERPL-MCNC: 1.19 MG/DL — SIGNIFICANT CHANGE UP (ref 0.5–1.3)
CULTURE RESULTS: SIGNIFICANT CHANGE UP
EGFR: 58 ML/MIN/1.73M2 — LOW
ESTIMATED AVERAGE GLUCOSE: 108 — SIGNIFICANT CHANGE UP
GLUCOSE SERPL-MCNC: 87 MG/DL — SIGNIFICANT CHANGE UP (ref 70–99)
HCT VFR BLD CALC: 42.8 % — SIGNIFICANT CHANGE UP (ref 39–50)
HDLC SERPL-MCNC: 41 MG/DL — SIGNIFICANT CHANGE UP
HGB BLD-MCNC: 12.2 G/DL — LOW (ref 13–17)
INR BLD: 1.23 RATIO — HIGH (ref 0.85–1.16)
LACTATE SERPL-SCNC: 2.1 MMOL/L — HIGH (ref 0.5–2)
LIPID PNL WITH DIRECT LDL SERPL: 113 MG/DL — HIGH
MAGNESIUM SERPL-MCNC: 2.4 MG/DL — SIGNIFICANT CHANGE UP (ref 1.6–2.6)
MCHC RBC-ENTMCNC: 24.6 PG — LOW (ref 27–34)
MCHC RBC-ENTMCNC: 28.5 G/DL — LOW (ref 32–36)
MCV RBC AUTO: 86.3 FL — SIGNIFICANT CHANGE UP (ref 80–100)
NON HDL CHOLESTEROL: 128 MG/DL — SIGNIFICANT CHANGE UP
NRBC # BLD: 0 /100 WBCS — SIGNIFICANT CHANGE UP (ref 0–0)
NRBC # FLD: 0 K/UL — SIGNIFICANT CHANGE UP (ref 0–0)
NT-PROBNP SERPL-SCNC: HIGH PG/ML
OSMOLALITY UR: 724 MOSM/KG — SIGNIFICANT CHANGE UP (ref 50–1200)
PHOSPHATE SERPL-MCNC: 3.9 MG/DL — SIGNIFICANT CHANGE UP (ref 2.5–4.5)
PLATELET # BLD AUTO: 322 K/UL — SIGNIFICANT CHANGE UP (ref 150–400)
POTASSIUM SERPL-MCNC: 4 MMOL/L — SIGNIFICANT CHANGE UP (ref 3.5–5.3)
POTASSIUM SERPL-SCNC: 4 MMOL/L — SIGNIFICANT CHANGE UP (ref 3.5–5.3)
PROCALCITONIN SERPL-MCNC: 0.11 NG/ML — HIGH (ref 0.02–0.1)
PROT ?TM UR-MCNC: 46 MG/DL — SIGNIFICANT CHANGE UP
PROT SERPL-MCNC: 8.7 G/DL — HIGH (ref 6–8.3)
PROT/CREAT UR-RTO: 0.4 RATIO — HIGH (ref 0–0.2)
PROTHROM AB SERPL-ACNC: 14.2 SEC — HIGH (ref 9.9–13.4)
RBC # BLD: 4.96 M/UL — SIGNIFICANT CHANGE UP (ref 4.2–5.8)
RBC # FLD: 17.5 % — HIGH (ref 10.3–14.5)
SODIUM SERPL-SCNC: 144 MMOL/L — SIGNIFICANT CHANGE UP (ref 135–145)
SODIUM UR-SCNC: 81 MMOL/L — SIGNIFICANT CHANGE UP
SPECIMEN SOURCE: SIGNIFICANT CHANGE UP
TRIGL SERPL-MCNC: 82 MG/DL — SIGNIFICANT CHANGE UP
UUN UR-MCNC: 973.7 MG/DL — SIGNIFICANT CHANGE UP
VIT B12 SERPL-MCNC: 636 PG/ML — SIGNIFICANT CHANGE UP (ref 200–900)
WBC # BLD: 5.79 K/UL — SIGNIFICANT CHANGE UP (ref 3.8–10.5)
WBC # FLD AUTO: 5.79 K/UL — SIGNIFICANT CHANGE UP (ref 3.8–10.5)

## 2024-12-27 PROCEDURE — 99222 1ST HOSP IP/OBS MODERATE 55: CPT

## 2024-12-27 PROCEDURE — 93306 TTE W/DOPPLER COMPLETE: CPT | Mod: 26

## 2024-12-27 PROCEDURE — 99233 SBSQ HOSP IP/OBS HIGH 50: CPT

## 2024-12-27 PROCEDURE — 93356 MYOCRD STRAIN IMG SPCKL TRCK: CPT

## 2024-12-27 PROCEDURE — 76376 3D RENDER W/INTRP POSTPROCES: CPT | Mod: 26

## 2024-12-27 RX ORDER — CARVEDILOL 25 MG/1
6.25 TABLET, FILM COATED ORAL EVERY 12 HOURS
Refills: 0 | Status: DISCONTINUED | OUTPATIENT
Start: 2024-12-27 | End: 2025-01-09

## 2024-12-27 RX ORDER — PIPERACILLIN AND TAZOBACTAM 3; .375 G/15ML; G/15ML
3.38 INJECTION, POWDER, LYOPHILIZED, FOR SOLUTION INTRAVENOUS ONCE
Refills: 0 | Status: COMPLETED | OUTPATIENT
Start: 2024-12-27 | End: 2024-12-27

## 2024-12-27 RX ORDER — PIPERACILLIN AND TAZOBACTAM 3; .375 G/15ML; G/15ML
3.38 INJECTION, POWDER, LYOPHILIZED, FOR SOLUTION INTRAVENOUS EVERY 12 HOURS
Refills: 0 | Status: DISCONTINUED | OUTPATIENT
Start: 2024-12-27 | End: 2024-12-27

## 2024-12-27 RX ORDER — PIPERACILLIN AND TAZOBACTAM 3; .375 G/15ML; G/15ML
3.38 INJECTION, POWDER, LYOPHILIZED, FOR SOLUTION INTRAVENOUS EVERY 8 HOURS
Refills: 0 | Status: COMPLETED | OUTPATIENT
Start: 2024-12-27 | End: 2024-12-30

## 2024-12-27 RX ORDER — SODIUM CHLORIDE 9 MG/ML
1000 INJECTION, SOLUTION INTRAMUSCULAR; INTRAVENOUS; SUBCUTANEOUS
Refills: 0 | Status: DISCONTINUED | OUTPATIENT
Start: 2024-12-27 | End: 2024-12-29

## 2024-12-27 RX ORDER — TRAZODONE HYDROCHLORIDE 150 MG/1
50 TABLET ORAL AT BEDTIME
Refills: 0 | Status: DISCONTINUED | OUTPATIENT
Start: 2024-12-27 | End: 2025-01-09

## 2024-12-27 RX ORDER — LATANOPROST 50 UG/ML
1 SOLUTION OPHTHALMIC AT BEDTIME
Refills: 0 | Status: DISCONTINUED | OUTPATIENT
Start: 2024-12-27 | End: 2025-01-09

## 2024-12-27 RX ORDER — METOPROLOL TARTRATE 50 MG
5 TABLET ORAL EVERY 6 HOURS
Refills: 0 | Status: DISCONTINUED | OUTPATIENT
Start: 2024-12-27 | End: 2024-12-27

## 2024-12-27 RX ORDER — CHLORHEXIDINE GLUCONATE 1.2 MG/ML
1 RINSE ORAL DAILY
Refills: 0 | Status: DISCONTINUED | OUTPATIENT
Start: 2024-12-27 | End: 2025-01-09

## 2024-12-27 RX ORDER — LORAZEPAM 1 MG/1
2 TABLET ORAL ONCE
Refills: 0 | Status: DISCONTINUED | OUTPATIENT
Start: 2024-12-27 | End: 2025-01-09

## 2024-12-27 RX ADMIN — PIPERACILLIN AND TAZOBACTAM 200 GRAM(S): 3; .375 INJECTION, POWDER, LYOPHILIZED, FOR SOLUTION INTRAVENOUS at 01:44

## 2024-12-27 RX ADMIN — PIPERACILLIN AND TAZOBACTAM 25 GRAM(S): 3; .375 INJECTION, POWDER, LYOPHILIZED, FOR SOLUTION INTRAVENOUS at 13:27

## 2024-12-27 RX ADMIN — HEPARIN SODIUM UNIT(S)/HR: 1000 INJECTION, SOLUTION INTRAVENOUS; SUBCUTANEOUS at 19:14

## 2024-12-27 RX ADMIN — Medication 81 MILLIGRAM(S): at 17:36

## 2024-12-27 RX ADMIN — CHLORHEXIDINE GLUCONATE 1 APPLICATION(S): 1.2 RINSE ORAL at 22:00

## 2024-12-27 RX ADMIN — TRAZODONE HYDROCHLORIDE 50 MILLIGRAM(S): 150 TABLET ORAL at 22:01

## 2024-12-27 RX ADMIN — Medication 5 MILLIGRAM(S): at 11:29

## 2024-12-27 RX ADMIN — HEPARIN SODIUM UNIT(S)/HR: 1000 INJECTION, SOLUTION INTRAVENOUS; SUBCUTANEOUS at 21:13

## 2024-12-27 RX ADMIN — PIPERACILLIN AND TAZOBACTAM 3.38 GRAM(S): 3; .375 INJECTION, POWDER, LYOPHILIZED, FOR SOLUTION INTRAVENOUS at 13:20

## 2024-12-27 RX ADMIN — HEPARIN SODIUM UNIT(S)/HR: 1000 INJECTION, SOLUTION INTRAVENOUS; SUBCUTANEOUS at 14:14

## 2024-12-27 RX ADMIN — CARVEDILOL 6.25 MILLIGRAM(S): 25 TABLET, FILM COATED ORAL at 17:36

## 2024-12-27 RX ADMIN — LATANOPROST 1 DROP(S): 50 SOLUTION OPHTHALMIC at 22:01

## 2024-12-27 RX ADMIN — PIPERACILLIN AND TAZOBACTAM 25 GRAM(S): 3; .375 INJECTION, POWDER, LYOPHILIZED, FOR SOLUTION INTRAVENOUS at 22:02

## 2024-12-27 RX ADMIN — Medication 5 MILLIGRAM(S): at 17:36

## 2024-12-27 RX ADMIN — TAMSULOSIN HYDROCHLORIDE 0.4 MILLIGRAM(S): 0.4 CAPSULE ORAL at 22:01

## 2024-12-27 RX ADMIN — PIPERACILLIN AND TAZOBACTAM 25 GRAM(S): 3; .375 INJECTION, POWDER, LYOPHILIZED, FOR SOLUTION INTRAVENOUS at 09:18

## 2024-12-27 RX ADMIN — Medication 17 GRAM(S): at 17:36

## 2024-12-27 RX ADMIN — HEPARIN SODIUM UNIT(S)/HR: 1000 INJECTION, SOLUTION INTRAVENOUS; SUBCUTANEOUS at 07:31

## 2024-12-27 RX ADMIN — SODIUM CHLORIDE 50 MILLILITER(S): 9 INJECTION, SOLUTION INTRAMUSCULAR; INTRAVENOUS; SUBCUTANEOUS at 09:18

## 2024-12-27 RX ADMIN — SODIUM CHLORIDE 50 MILLILITER(S): 9 INJECTION, SOLUTION INTRAMUSCULAR; INTRAVENOUS; SUBCUTANEOUS at 01:51

## 2024-12-27 NOTE — PROGRESS NOTE ADULT - PROBLEM SELECTOR PLAN 8
- continue finasteride 5mg QD and Flomax 0.4mg QHS. - continue finasteride 5mg QD and Flomax 0.4mg QHS.    # Glaucoma: continue home Latanoprost  # Insomnia: on Trazodone at home, continuing as 50mg QHS, confirm if pt on 50mg or 100mg QHS (admission med rec inaccurate)

## 2024-12-27 NOTE — PROGRESS NOTE ADULT - PROBLEM SELECTOR PLAN 2
- Started on Zosyn empirically (prior culture results showing Klebsiella and staph) but pt also w/ concern of possible PNA.   - On Zosyn empirically. - Started on Zosyn empirically (prior culture results showing Klebsiella and staph) but pt also w/ concern of possible PNA.   - On Zosyn empirically.    # suprapubic carter: due to be exchanged, difficulty to do by home care nurse. Urology consulted.

## 2024-12-27 NOTE — PROCEDURE NOTE - NSPATIENTPOSTION_GEN_A_CORE
REASON FOR CONSULTATION/VISIT:  Chief Complaint   Patient presents with   • Office Visit   • Surgical Followup     REFERRING PHYSICIAN:  Michael Monroe MD    HISTORY OF PRESENT ILLNESS:  Gretchen Oneil is a 13 month old female who presents with a pyogenic granuloma on the right cheek.  Excision was performed in the operating room on 9/20. This was complicated by a post-operative infection of the surgical site. Patient began antibiotics on 9/22 and completed a 5-day course. Patient's mother notes all symptoms of redness and swelling have resolved. There is no report of recent fevers or chills or illnesses. There is no abnormal findings on the most recent well-child visit. Of note, there is a family history of malignant hyperthermia.     ALLERGIES:  ALLERGIES:  No Known Allergies    CURRENT MEDICATIONS:  No current outpatient medications on file.     No current facility-administered medications for this visit.     PAST MEDICAL AND SURGICAL HISTORY:  Patient Active Problem List   Diagnosis   • Single liveborn, born in hospital, delivered by vaginal delivery   • LGA (large for gestational age) infant     Past Medical History:   Diagnosis Date   • Malignant hyperthermia     happened a long time ago great uncle paternal side     History reviewed. No pertinent surgical history.    FAMILY AND SOCIAL HISTORY:  History reviewed. No pertinent family history.  Social History     Tobacco Use   • Smoking status: Never Smoker   • Smokeless tobacco: Never Used       REVIEW OF SYSTEMS:  Review of Systems   Constitutional: Negative for activity change, appetite change and fever.   HENT: Negative for congestion and trouble swallowing.    Eyes: Negative for discharge and visual disturbance.   Respiratory: Negative for cough and wheezing.    Cardiovascular: Negative for chest pain and cyanosis.   Gastrointestinal: Negative for abdominal pain, diarrhea and vomiting.   Endocrine: Negative for polydipsia and polyuria.   Musculoskeletal:  Negative for gait problem and joint swelling.   Skin: Negative for color change and rash.   Neurological: Negative for seizures and weakness.       PHYSICAL EXAMINATION:  Visit Vitals  Ht 28.35\" (72 cm)     Physical Exam  Constitutional:       Appearance: She is well-developed.   HENT:      Head: Atraumatic. No signs of injury.      Comments: Surgical incision healing well. No signs of infection or recurrence.     Mouth/Throat:      Mouth: Mucous membranes are moist.      Pharynx: Oropharynx is clear.   Eyes:      General:         Right eye: No discharge.         Left eye: No discharge.      Conjunctiva/sclera: Conjunctivae normal.      Pupils: Pupils are equal, round, and reactive to light.   Cardiovascular:      Comments: Extremities warm and well perfused. No cyanosis.   Pulmonary:      Effort: Pulmonary effort is normal. No respiratory distress.      Breath sounds: Normal breath sounds.   Abdominal:      General: There is no distension.      Palpations: Abdomen is soft.      Tenderness: There is no abdominal tenderness.   Musculoskeletal:         General: No deformity. Normal range of motion.      Cervical back: Normal range of motion and neck supple.   Skin:     General: Skin is cool and moist.   Neurological:      Mental Status: She is alert.      Cranial Nerves: No cranial nerve deficit.     BLOODWORK:  No pertinent bloodwork    PATHOLOGY:  Right cheek pyogenic granuloma; excision:   -Pyogenic granuloma.  -No evidence of malignancy.    IMPRESSION AND PLAN:  Gretchen Oneil is a 13 month old female who presents with a pyogenic granuloma on the right cheek. She is s/p excision on 9/20. Patient developed an infection post-operatively and has since completed a 5-day course of antibiotics. Symptoms have resolved. I discussed the pathology results with the family. All questions were answered. I discussed that scars look their worst at ~4 months since surgery, and is expected to appear red, raised, and thick.  I  discussed that this will begin to fade and settle over the next 8 months.  I recommended application of silicone gel, as well as scar massage and use of sunblock. I asked that they return to clinic in 3 months. We thank you for the opportunity to participate in this patient's care.    - Total Time: Greater than 20 minutes spent. This total time included the following components: reviewing patient’s chart, reviewing results, obtaining a medical history, performing a physical examination, counseling the patient/family member/caregiver, ordering any necessary medications, tests, or procedures, documenting clinical information in the EHR, referring to or communicating with other health care professionals if needed, independently interpreting any results, and providing care coordination.    Gretchen was seen today for office visit and surgical followup.    Diagnoses and all orders for this visit:    Pyogenic granuloma       Shahram Soliz MD, FACS, FAAP  10/3/2022     supine

## 2024-12-27 NOTE — PROGRESS NOTE ADULT - PROBLEM SELECTOR PLAN 3
- CTA head and neck finding b/l pulmonary embolism, pt on4L NC.   - Await official CTA chest.   - TTE: EF 45%, grade 1 diastolic dysfunction, severe pulm HTN 89mmHg.   - heparin gtt.

## 2024-12-27 NOTE — PATIENT PROFILE ADULT - FALL HARM RISK - HARM RISK INTERVENTIONS
Assistance with ambulation/Assistance OOB with selected safe patient handling equipment/Communicate Risk of Fall with Harm to all staff/Discuss with provider need for PT consult/Monitor for mental status changes/Monitor gait and stability/Move patient closer to nurses' station/Reinforce activity limits and safety measures with patient and family/Reorient to person, place and time as needed/Tailored Fall Risk Interventions/Toileting schedule using arm’s reach rule for commode and bathroom/Use of alarms - bed, chair and/or voice tab/Visual Cue: Yellow wristband and red socks/Bed in lowest position, wheels locked, appropriate side rails in place/Call bell, personal items and telephone in reach/Instruct patient to call for assistance before getting out of bed or chair/Non-slip footwear when patient is out of bed/[place] to call system/Physically safe environment - no spills, clutter or unnecessary equipment/Purposeful Proactive Rounding/Room/bathroom lighting operational, light cord in reach

## 2024-12-27 NOTE — OCCUPATIONAL THERAPY INITIAL EVALUATION ADULT - PERTINENT HX OF CURRENT PROBLEM, REHAB EVAL
Pt is an 89 y/o M with PMH of dementia, CAD s/p CABG and stents, prior DVT, and BPH with chronic indwelling carter catheter, PAD, right facial droop at baseline (no hx of CVA), presents to ED for right facial droop worsening from baseline and slurred speech x 2 days. Concern for TME vs CVA vs seizure.

## 2024-12-27 NOTE — OCCUPATIONAL THERAPY INITIAL EVALUATION ADULT - GENERAL OBSERVATIONS, REHAB EVAL
Patient found semi-reclined in bed, lethargic, NAD, and able to minimally follow simple directions. Vitals: HR 74 BPM. Patient agreeable to participate in skilled OT evaluation.

## 2024-12-27 NOTE — OCCUPATIONAL THERAPY INITIAL EVALUATION ADULT - RANGE OF MOTION EXAMINATION, UPPER EXTREMITY
limited active movement in BUE due to weakness/bilateral UE Active Assistive ROM was WFL  (within functional limits)

## 2024-12-27 NOTE — PHYSICAL THERAPY INITIAL EVALUATION ADULT - ADDITIONAL COMMENTS
Unable to obtain accurate social history secondary to pt. presenting with confusion during subjective history, limited social history obtained through past medical documents, please refer to social work for more attainable social history.   Post PT evaluation, pt left with head of bed elevated to 30 degrees, alarm on, call bell and remote within reach, all precautions maintained, NAD. RN aware.

## 2024-12-27 NOTE — PROGRESS NOTE ADULT - ASSESSMENT
89M PMHx significant for pmhx of dementia, CAD s/p CABG and stents, prior DVT, and BPH with chronic indwelling carter catheter, PAD, right facial droop at baseline (no hx of CVA), presents to ED for right facial droop worsening from baseline and slurred speech x 2 days concern for TME vs CVA vs seizure

## 2024-12-27 NOTE — PROGRESS NOTE ADULT - PROBLEM SELECTOR PLAN 9
Diet: minced and moist, DASH   DVT: heparin ggt  Dispo: TBD Diet: minced and moist, DASH   DVT: heparin ggt  Dispo: TBD    addendum: GOC discussion by ACP w/ daughter. Patient was DNR/DNI w/ outpt provider, but daughter did not feel comfortable signing updated MOLST inpatient. She will bring old MOLST. She wants to keep patient FC in the interim. I will also discuss w/ daughter myself.

## 2024-12-27 NOTE — PROGRESS NOTE ADULT - SUBJECTIVE AND OBJECTIVE BOX
[facility] Division of Hospital Medicine  Hi Ware MD   Available via MS Teams  In house pager [phone]    88 yo M admitted for worsening R facial droop, seizure r/o, UTI and PE.     SUBJECTIVE / OVERNIGHT EVENTS:  - No acute events. Pt seen and examined at bedside.     ADDITIONAL REVIEW OF SYSTEMS: no change in ROS     MEDICATIONS  (STANDING):  aspirin  chewable 81 milliGRAM(s) Oral daily  carvedilol 6.25 milliGRAM(s) Oral every 12 hours  finasteride 5 milliGRAM(s) Oral daily  heparin  Infusion.  Unit(s)/Hr (11 mL/Hr) IV Continuous <Continuous>  latanoprost 0.005% Ophthalmic Solution 1 Drop(s) Both EYES at bedtime  multivitamin 1 Tablet(s) Oral daily  piperacillin/tazobactam IVPB.. 3.375 Gram(s) IV Intermittent every 8 hours  polyethylene glycol 3350 17 Gram(s) Oral two times a day  sodium chloride 0.9%. 1000 milliLiter(s) (50 mL/Hr) IV Continuous <Continuous>  tamsulosin 0.4 milliGRAM(s) Oral at bedtime    MEDICATIONS  (PRN):  acetaminophen     Tablet .. 650 milliGRAM(s) Oral every 6 hours PRN Temp greater or equal to 38C (100.4F), Mild Pain (1 - 3)  aluminum hydroxide/magnesium hydroxide/simethicone Suspension 30 milliLiter(s) Oral every 4 hours PRN Dyspepsia  heparin   Injectable 4500 Unit(s) IV Push every 6 hours PRN For aPTT less than 40  heparin   Injectable 2000 Unit(s) IV Push every 6 hours PRN For aPTT between 40 - 57  melatonin 3 milliGRAM(s) Oral at bedtime PRN Insomnia  ondansetron Injectable 4 milliGRAM(s) IV Push every 8 hours PRN Nausea and/or Vomiting      I&O's Summary    27 Dec 2024 07:01  -  27 Dec 2024 16:33  --------------------------------------------------------  IN: 300 mL / OUT: 325 mL / NET: -25 mL        PHYSICAL EXAM:  Vital Signs Last 24 Hrs  T(C): 36.9 (27 Dec 2024 15:30), Max: 37.2 (26 Dec 2024 21:05)  T(F): 98.4 (27 Dec 2024 15:30), Max: 98.9 (26 Dec 2024 21:05)  HR: 80 (27 Dec 2024 15:30) (72 - 85)  BP: 187/91 (27 Dec 2024 15:30) (141/98 - 199/103)  BP(mean): --  RR: 18 (27 Dec 2024 15:30) (16 - 19)  SpO2: 100% (27 Dec 2024 15:30) (93% - 100%)    Parameters below as of 27 Dec 2024 15:30  Patient On (Oxygen Delivery Method): nasal cannula  O2 Flow (L/min): 4    CONSTITUTIONAL: NAD  EYES: EOMI, no injury   ENMT: Moist oral mucosa  NECK: Supple, no palpable masses  RESPIRATORY: Normal respiratory effort; lungs are clear to auscultation bilaterally  CARDIOVASCULAR: Regular rate and rhythm, normal S1 and S2, systolic murmur; No lower extremity erick  ABDOMEN: Nontender to palpation, normoactive bowel sounds  MUSCULOSKELETAL: no clubbing or cyanosis of digits; no joint swelling or tenderness to palpation  PSYCH: A+O to person, plac  NEUROLOGY: R facial droop   SKIN: No rashes    LABS:                        12.2   5.79  )-----------( 322      ( 27 Dec 2024 06:21 )             42.8     12-27    144  |  106  |  30[H]  ----------------------------<  87  4.0   |  21[L]  |  1.19    Ca    9.7      27 Dec 2024 06:21  Phos  3.9     12-27  Mg     2.40     12-27    TPro  8.7[H]  /  Alb  3.3  /  TBili  0.3  /  DBili  x   /  AST  21  /  ALT  21  /  AlkPhos  101  12-27    PT/INR - ( 27 Dec 2024 06:21 )   PT: 14.2 sec;   INR: 1.23 ratio         PTT - ( 27 Dec 2024 13:40 )  PTT:89.8 sec      Urinalysis Basic - ( 27 Dec 2024 06:21 )    Color: x / Appearance: x / SG: x / pH: x  Gluc: 87 mg/dL / Ketone: x  / Bili: x / Urobili: x   Blood: x / Protein: x / Nitrite: x   Leuk Esterase: x / RBC: x / WBC x   Sq Epi: x / Non Sq Epi: x / Bacteria: x        SARS-CoV-2: NotDetec (27 Nov 2024 13:52)      RADIOLOGY & ADDITIONAL TESTS:  New Results Reviewed Today: [x]  New Imaging Personally Reviewed Today: [x]      COMMUNICATION:  Discussions with Patient/Family: [x]

## 2024-12-27 NOTE — PHYSICAL THERAPY INITIAL EVALUATION ADULT - PERTINENT HX OF CURRENT PROBLEM, REHAB EVAL
88 y/o M w PMHx significant for pmhx of dementia, CAD s/p CABG and stents, prior DVT, and BPH with chronic indwelling carter catheter, PAD, right facial droop at baseline (no hx of CVA), presents to ED for right facial droop worsening from baseline and slurred speech x 2 days.concern for TME vs CVA vs seizure.

## 2024-12-27 NOTE — PATIENT PROFILE ADULT - NSTOBACCOUNKNOWNSMK_GEN_A_CORE_RD
Show Topical Anesthesia Variable?: Yes Detail Level: Detailed Spray Paint Text: The liquid nitrogen was applied to the skin utilizing a spray paint frosting technique. Add 52 Modifier (Optional): no Consent: The patient's consent was obtained including but not limited to risks of crusting, scabbing, blistering, scarring, darker or lighter pigmentary change, recurrence, incomplete removal and infection. Medical Necessity Information: It is in your best interest to select a reason for this procedure from the list below. All of these items fulfill various CMS LCD requirements except the new and changing color options. Post-Care Instructions: I reviewed with the patient in detail post-care instructions. Patient is to wear sunprotection, and avoid picking at any of the treated lesions. Pt may apply Vaseline to crusted or scabbing areas. Medical Necessity Clause: This procedure was medically necessary because the lesions that were treated were: Cognitively Impaired

## 2024-12-27 NOTE — PROGRESS NOTE ADULT - PROBLEM SELECTOR PLAN 6
- A&OX1-2 at baseline, A&Ox person and place currently.   - minimal ambulation at baseline with assistance.

## 2024-12-27 NOTE — CHART NOTE - NSCHARTNOTEFT_GEN_A_CORE
Patient seen and examined with daughter, Rosana, at bedside. Discussed GOC with Rosana.     She states she is in possession of MOLST that is located at home. She has photos of MOLST on her cell phone but is unable to produce physical copy at this time. Photos of MOLST on her cell phone reviewed, which state DNR, trial NIV. This MOLST appears to be signed by Jeffrey Gandhi MD in June 2024. Rosana states her brother can bring MOLST when he visits hospital (unknown when he is coming). Rosana is unwilling to sign new MOLST at this time. Informed Rosana that patient will remain full code until physical copy of MOLST is produced or until she signs new MOLST. Rosana verbalized understanding. Discussed with Dr. Ware.     Rivka Davis, MELANY-BC  Department of Medicine  [facility]  [phone]

## 2024-12-27 NOTE — OCCUPATIONAL THERAPY INITIAL EVALUATION ADULT - LEVEL OF INDEPENDENCE, REHAB EVAL
[FreeTextEntry1] : Patient has volar radial wrist pain.  There is marked tenderness at STT joint and pain with manipulation.  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  Patient was given a wrist support splint.  She applied the wrist splint and noted relief of pain immediately.  Patient was able to move thumb and fingers without notable pain.  Because the orthosis prevented wrist flexion/extension or radial/ulnar deviation she felt that she was extremely comfortable.\par Patient should use wrist orthosis as needed.  Intra-articular steroid injection is possible but unlikely to provide long-lasting relief.  Surgery is not recommended but was discussed for completeness.  Patient declined surgery.\par Patient has scattered arthritis in small joints but these are not notably symptomatic.\par Patient has no other active hand problem requiring treatment.\par Patient has a mild tremor.  Patient is recommended to consult neurologist to confirm that the tremor is benign.  Possibly this can be treated and this would be beneficial to patient who understands the potential benefit.\par No other notable hand problem identified requiring treatment.\par Prognosis limited.\par Return as needed.\par A lengthy and detailed discussion was held with the patient regarding analysis, treatment, and recommendations. All questions have been answered. At the conclusion the patient expressed understanding but not complete acceptance.
due to lethargy and limited participation/unable to perform

## 2024-12-27 NOTE — PROCEDURE NOTE - NSFINDINGS_GEN_A_CORE
straw colored Cephalexin Counseling: I counseled the patient regarding use of cephalexin as an antibiotic for prophylactic and/or therapeutic purposes. Cephalexin (commonly prescribed under brand name Keflex) is a cephalosporin antibiotic which is active against numerous classes of bacteria, including most skin bacteria. Side effects may include nausea, diarrhea, gastrointestinal upset, rash, hives, yeast infections, and in rare cases, hepatitis, kidney disease, seizures, fever, confusion, neurologic symptoms, and others. Patients with severe allergies to penicillin medications are cautioned that there is about a 10% incidence of cross-reactivity with cephalosporins. When possible, patients with penicillin allergies should use alternatives to cephalosporins for antibiotic therapy.

## 2024-12-27 NOTE — PHYSICAL THERAPY INITIAL EVALUATION ADULT - GENERAL OBSERVATIONS, REHAB EVAL
Pt received semi-supine in bed, with all lines intact, NAD, all precautions maintained. Pt received semi-supine in bed, with all lines intact, NAD, all precautions maintained. HR: 75

## 2024-12-28 DIAGNOSIS — R93.1 ABNORMAL FINDINGS ON DIAGNOSTIC IMAGING OF HEART AND CORONARY CIRCULATION: ICD-10-CM

## 2024-12-28 LAB
ANION GAP SERPL CALC-SCNC: 10 MMOL/L — SIGNIFICANT CHANGE UP (ref 7–14)
APTT BLD: 86.8 SEC — HIGH (ref 24.5–35.6)
BUN SERPL-MCNC: 21 MG/DL — SIGNIFICANT CHANGE UP (ref 7–23)
CALCIUM SERPL-MCNC: 8.8 MG/DL — SIGNIFICANT CHANGE UP (ref 8.4–10.5)
CHLORIDE SERPL-SCNC: 109 MMOL/L — HIGH (ref 98–107)
CO2 SERPL-SCNC: 24 MMOL/L — SIGNIFICANT CHANGE UP (ref 22–31)
CREAT SERPL-MCNC: 1.12 MG/DL — SIGNIFICANT CHANGE UP (ref 0.5–1.3)
EGFR: 63 ML/MIN/1.73M2 — SIGNIFICANT CHANGE UP
GLUCOSE SERPL-MCNC: 91 MG/DL — SIGNIFICANT CHANGE UP (ref 70–99)
HCT VFR BLD CALC: 34.6 % — LOW (ref 39–50)
HGB BLD-MCNC: 10 G/DL — LOW (ref 13–17)
LACTATE SERPL-SCNC: 1.2 MMOL/L — SIGNIFICANT CHANGE UP (ref 0.5–2)
MAGNESIUM SERPL-MCNC: 2.1 MG/DL — SIGNIFICANT CHANGE UP (ref 1.6–2.6)
MCHC RBC-ENTMCNC: 24.9 PG — LOW (ref 27–34)
MCHC RBC-ENTMCNC: 28.9 G/DL — LOW (ref 32–36)
MCV RBC AUTO: 86.1 FL — SIGNIFICANT CHANGE UP (ref 80–100)
NRBC # BLD: 0 /100 WBCS — SIGNIFICANT CHANGE UP (ref 0–0)
NRBC # FLD: 0 K/UL — SIGNIFICANT CHANGE UP (ref 0–0)
PHOSPHATE SERPL-MCNC: 3.3 MG/DL — SIGNIFICANT CHANGE UP (ref 2.5–4.5)
PLATELET # BLD AUTO: 278 K/UL — SIGNIFICANT CHANGE UP (ref 150–400)
POTASSIUM SERPL-MCNC: 3.6 MMOL/L — SIGNIFICANT CHANGE UP (ref 3.5–5.3)
POTASSIUM SERPL-SCNC: 3.6 MMOL/L — SIGNIFICANT CHANGE UP (ref 3.5–5.3)
RBC # BLD: 4.02 M/UL — LOW (ref 4.2–5.8)
RBC # FLD: 17.1 % — HIGH (ref 10.3–14.5)
SODIUM SERPL-SCNC: 143 MMOL/L — SIGNIFICANT CHANGE UP (ref 135–145)
WBC # BLD: 6.14 K/UL — SIGNIFICANT CHANGE UP (ref 3.8–10.5)
WBC # FLD AUTO: 6.14 K/UL — SIGNIFICANT CHANGE UP (ref 3.8–10.5)

## 2024-12-28 PROCEDURE — 99233 SBSQ HOSP IP/OBS HIGH 50: CPT

## 2024-12-28 RX ADMIN — HEPARIN SODIUM UNIT(S)/HR: 1000 INJECTION, SOLUTION INTRAVENOUS; SUBCUTANEOUS at 19:23

## 2024-12-28 RX ADMIN — Medication 81 MILLIGRAM(S): at 11:50

## 2024-12-28 RX ADMIN — TRAZODONE HYDROCHLORIDE 50 MILLIGRAM(S): 150 TABLET ORAL at 21:40

## 2024-12-28 RX ADMIN — HEPARIN SODIUM UNIT(S)/HR: 1000 INJECTION, SOLUTION INTRAVENOUS; SUBCUTANEOUS at 02:01

## 2024-12-28 RX ADMIN — CHLORHEXIDINE GLUCONATE 1 APPLICATION(S): 1.2 RINSE ORAL at 11:50

## 2024-12-28 RX ADMIN — HEPARIN SODIUM UNIT(S)/HR: 1000 INJECTION, SOLUTION INTRAVENOUS; SUBCUTANEOUS at 02:11

## 2024-12-28 RX ADMIN — PIPERACILLIN AND TAZOBACTAM 25 GRAM(S): 3; .375 INJECTION, POWDER, LYOPHILIZED, FOR SOLUTION INTRAVENOUS at 05:58

## 2024-12-28 RX ADMIN — CARVEDILOL 6.25 MILLIGRAM(S): 25 TABLET, FILM COATED ORAL at 17:58

## 2024-12-28 RX ADMIN — HEPARIN SODIUM UNIT(S)/HR: 1000 INJECTION, SOLUTION INTRAVENOUS; SUBCUTANEOUS at 10:45

## 2024-12-28 RX ADMIN — Medication 1 TABLET(S): at 11:49

## 2024-12-28 RX ADMIN — PIPERACILLIN AND TAZOBACTAM 25 GRAM(S): 3; .375 INJECTION, POWDER, LYOPHILIZED, FOR SOLUTION INTRAVENOUS at 21:43

## 2024-12-28 RX ADMIN — PIPERACILLIN AND TAZOBACTAM 25 GRAM(S): 3; .375 INJECTION, POWDER, LYOPHILIZED, FOR SOLUTION INTRAVENOUS at 14:41

## 2024-12-28 RX ADMIN — TAMSULOSIN HYDROCHLORIDE 0.4 MILLIGRAM(S): 0.4 CAPSULE ORAL at 21:40

## 2024-12-28 RX ADMIN — Medication 17 GRAM(S): at 05:58

## 2024-12-28 RX ADMIN — HEPARIN SODIUM UNIT(S)/HR: 1000 INJECTION, SOLUTION INTRAVENOUS; SUBCUTANEOUS at 07:29

## 2024-12-28 RX ADMIN — LATANOPROST 1 DROP(S): 50 SOLUTION OPHTHALMIC at 21:38

## 2024-12-28 RX ADMIN — Medication 5 MILLIGRAM(S): at 11:49

## 2024-12-28 NOTE — PROGRESS NOTE ADULT - PROBLEM SELECTOR PLAN 6
- A&OX1-2 at baseline, A&Ox person and place currently.   - minimal ambulation at baseline with assistance  - 12/28: alert and oriented to person only.

## 2024-12-28 NOTE — DIETITIAN INITIAL EVALUATION ADULT - REASON FOR ADMISSION
Per chart review, 89-year-old Male PMHx significant for pmhx of dementia, CAD s/p CABG and stents, prior DVT, and BPH with chronic indwelling carter catheter, PAD, right facial droop at baseline (no hx of CVA), presents to ED for right facial droop worsening from baseline and slurred speech x 2 days concern for TME vs CVA vs seizure

## 2024-12-28 NOTE — DIETITIAN INITIAL EVALUATION ADULT - PERTINENT LABORATORY DATA
12-28    143  |  109[H]  |  21  ----------------------------<  91  3.6   |  24  |  1.12    Ca    8.8      28 Dec 2024 08:14  Phos  3.3     12-28  Mg     2.10     12-28    TPro  8.7[H]  /  Alb  3.3  /  TBili  0.3  /  DBili  x   /  AST  21  /  ALT  21  /  AlkPhos  101  12-27  A1C with Estimated Average Glucose Result: 5.4 % (12-27-24 @ 13:40)

## 2024-12-28 NOTE — PROGRESS NOTE ADULT - SUBJECTIVE AND OBJECTIVE BOX
[facility] Division of Hospital Medicine  Hi Ware MD   Available via MS Teams  In house pager [phone]    89 yo M admitted for worsening R facial droop, seizure r/o, UTI and PE.     SUBJECTIVE / OVERNIGHT EVENTS:  - No acute events. Pt seen and examined at bedside.     ADDITIONAL REVIEW OF SYSTEMS: incomplete d/t baseline dementia     MEDICATIONS  (STANDING):  aspirin  chewable 81 milliGRAM(s) Oral daily  carvedilol 6.25 milliGRAM(s) Oral every 12 hours  chlorhexidine 2% Cloths 1 Application(s) Topical daily  finasteride 5 milliGRAM(s) Oral daily  heparin  Infusion.  Unit(s)/Hr (11 mL/Hr) IV Continuous <Continuous>  latanoprost 0.005% Ophthalmic Solution 1 Drop(s) Both EYES at bedtime  multivitamin 1 Tablet(s) Oral daily  piperacillin/tazobactam IVPB.. 3.375 Gram(s) IV Intermittent every 8 hours  polyethylene glycol 3350 17 Gram(s) Oral two times a day  sodium chloride 0.9%. 1000 milliLiter(s) (50 mL/Hr) IV Continuous <Continuous>  tamsulosin 0.4 milliGRAM(s) Oral at bedtime  traZODone 50 milliGRAM(s) Oral at bedtime    MEDICATIONS  (PRN):  acetaminophen     Tablet .. 650 milliGRAM(s) Oral every 6 hours PRN Temp greater or equal to 38C (100.4F), Mild Pain (1 - 3)  aluminum hydroxide/magnesium hydroxide/simethicone Suspension 30 milliLiter(s) Oral every 4 hours PRN Dyspepsia  heparin   Injectable 4500 Unit(s) IV Push every 6 hours PRN For aPTT less than 40  heparin   Injectable 2000 Unit(s) IV Push every 6 hours PRN For aPTT between 40 - 57  LORazepam   Injectable 2 milliGRAM(s) IV Push once PRN Seizure  melatonin 3 milliGRAM(s) Oral at bedtime PRN Insomnia  ondansetron Injectable 4 milliGRAM(s) IV Push every 8 hours PRN Nausea and/or Vomiting      I&O's Summary    27 Dec 2024 07:01  -  28 Dec 2024 07:00  --------------------------------------------------------  IN: 300 mL / OUT: 975 mL / NET: -675 mL        PHYSICAL EXAM:  Vital Signs Last 24 Hrs  T(C): 36.4 (28 Dec 2024 12:00), Max: 36.9 (27 Dec 2024 15:30)  T(F): 97.6 (28 Dec 2024 12:00), Max: 98.4 (27 Dec 2024 15:30)  HR: 75 (28 Dec 2024 12:00) (71 - 80)  BP: 173/90 (28 Dec 2024 12:00) (118/75 - 187/91)  BP(mean): --  RR: 18 (28 Dec 2024 12:00) (17 - 18)  SpO2: 100% (28 Dec 2024 12:00) (100% - 100%)    Parameters below as of 28 Dec 2024 12:00  Patient On (Oxygen Delivery Method): nasal cannula  O2 Flow (L/min): 4    CONSTITUTIONAL: NAD  EYES: EOMI, no injury   ENMT: Moist oral mucosa  NECK: Supple, no palpable masses  RESPIRATORY: Normal respiratory effort; lungs are clear to auscultation bilaterally  CARDIOVASCULAR: Regular rate and rhythm, normal S1 and S2, systolic murmur; No lower extremity erick  ABDOMEN: Nontender to palpation, normoactive bowel sounds  MUSCULOSKELETAL: no clubbing or cyanosis of digits; no joint swelling or tenderness to palpation  PSYCH: A+O to person only 12/28   NEUROLOGY: R facial droop   SKIN: No rashes      LABS:                        10.0   6.14  )-----------( 278      ( 28 Dec 2024 08:14 )             34.6     12-28    143  |  109[H]  |  21  ----------------------------<  91  3.6   |  24  |  1.12    Ca    8.8      28 Dec 2024 08:14  Phos  3.3     12-28  Mg     2.10     12-28    TPro  8.7[H]  /  Alb  3.3  /  TBili  0.3  /  DBili  x   /  AST  21  /  ALT  21  /  AlkPhos  101  12-27    PT/INR - ( 27 Dec 2024 06:21 )   PT: 14.2 sec;   INR: 1.23 ratio         PTT - ( 28 Dec 2024 08:14 )  PTT:86.8 sec      Urinalysis Basic - ( 28 Dec 2024 08:14 )    Color: x / Appearance: x / SG: x / pH: x  Gluc: 91 mg/dL / Ketone: x  / Bili: x / Urobili: x   Blood: x / Protein: x / Nitrite: x   Leuk Esterase: x / RBC: x / WBC x   Sq Epi: x / Non Sq Epi: x / Bacteria: x        Culture - Urine (collected 26 Dec 2024 20:25)  Source: Clean Catch Clean Catch (Midstream)  Final Report (27 Dec 2024 23:12):    >=3 organisms. Probable collection contamination.    Culture - Blood (collected 26 Dec 2024 15:45)  Source: .Blood BLOOD  Preliminary Report (28 Dec 2024 01:02):    No growth at 24 hours    Culture - Blood (collected 26 Dec 2024 15:30)  Source: .Blood BLOOD  Preliminary Report (28 Dec 2024 01:02):    No growth at 24 hours      SARS-CoV-2: NotDete (27 Nov 2024 13:52)      RADIOLOGY & ADDITIONAL TESTS:  New Results Reviewed Today: [x]  New Imaging Personally Reviewed Today: [x]    COMMUNICATION:  Care Discussed with Consultants/Other Providers and Details of Discussion: [x]

## 2024-12-28 NOTE — PROGRESS NOTE ADULT - ASSESSMENT
89M PMHx significant for pmhx of dementia, CAD s/p CABG and stents, prior DVT, and BPH with chronic indwelling carter catheter, PAD, right facial droop at baseline (no hx of CVA), presents to ED for right facial droop worsening from baseline and slurred speech x 2 days concern for TME vs CVA vs seizure.

## 2024-12-28 NOTE — DIETITIAN INITIAL EVALUATION ADULT - PROBLEM SELECTOR PLAN 1
- pt A&Ox1 as per chart review but has tendency to sundown while in hospital, found to be slurred speech and worsening facial droop x2 days  - pt VSS in ED, lactate of 4.1--> 2.1, UA +  - Neurology consulted for possible CVA, CTA head+neck without large vessel disease    Plan:  - r/o toxic metabolic etiology, treat for UTI with CTX daily, f/u cultures, procal, full RVP  - obtain MRI brain as per neuro recs  - pending TTE eval   - Video EEG for seizures  - aspiration precautions, seizure precautions

## 2024-12-28 NOTE — DIETITIAN INITIAL EVALUATION ADULT - OTHER INFO
Observed breakfast meal tray, ~65% of intake. Per RN flowsheets intake is 0-75%. No reported any in house nausea, vomiting, diarrhea, or constipation. Last BM noted on 12/26, fecal incontinence noted  per RN flowsheets. Bowel regimen is in placed. Patient receives minced and moist diet consistency and tolerates it well. No reported chewing/swallowing issues. Daughter reports she provides regular food to patient and patient tolerates regular food well at home. Daughter states patient doesn't need minced moist consistency. Please consider SLP evaluation for appropriate diet consistency. Daughter confirms patient doesn't eat Tilapia, made MD aware to update diet order. Labs reviewed, A1c 5.4% indicating good control, fingersticks WDL. IV fluids noted for hydration. Family amenable to a trial of Ensure Plus High Protein Therapeutic Shake 2x daily (700kcal, 40g protein). Encourage PO intake and honor food preferences as able. Discussed with daughter about the importance of meeting adequate protein-energy needs. Encouraged patient to consume small frequent meals to increase protein calorie intake. Encouraged consumption of oral nutrition supplement to optimize protein-energy intake. RD to remain available for further nutritional interventions as indicated

## 2024-12-28 NOTE — DIETITIAN INITIAL EVALUATION ADULT - ADD RECOMMEND
1. Recommend adding Ensure Plus High Protein Therapeutic Shake 2x daily (700kcal, 40g protein).   2. Consider SLP evaluation for appropriate diet consistency.  3. Encourage PO intake and honor food preferences as able.   4. Please consistently document %PO intake in nursing flowsheets  5. Monitor PO intake, Labs, weights, BMs, and skin integrity.

## 2024-12-28 NOTE — PROGRESS NOTE ADULT - PROBLEM SELECTOR PLAN 9
Diet: minced and moist, DASH   DVT: heparin ggt  Dispo: TBD    12/28: GOC discussion by ACP w/ daughter. Patient was DNR/DNI w/ outpt provider, but daughter did not feel comfortable signing updated MOLST inpatient. She will bring old MOLST. She wants to keep patient FC in the interim. I will also discuss w/ daughter myself. Diet: minced and moist, DASH   DVT: heparin ggt  Dispo: TBD    12/27: GOC discussion by ACP w/ daughter. Patient was DNR/DNI w/ outpt provider, but daughter did not feel comfortable signing updated MOLST inpatient. She will bring old MOLST. She wants to keep patient FC in the interim. I will also discuss w/ daughter myself.  12/28: Son brought in MOLST DNR w/ trial of intubation

## 2024-12-28 NOTE — PROGRESS NOTE ADULT - PROBLEM SELECTOR PLAN 8
- continue finasteride 5mg QD and Flomax 0.4mg QHS.    # Glaucoma: continue home Latanoprost.   # Insomnia: on Trazodone at home, continuing as 50mg QHS for now (admission med rec appears to have older meds on it as well), will confirm dose w/ family.

## 2024-12-28 NOTE — PROGRESS NOTE ADULT - PROBLEM SELECTOR PLAN 2
- Started on Zosyn empirically (prior culture results showing Klebsiella and staph) but pt also w/ concern of possible PNA.   - On Zosyn empirically. Would treat x 5d total (12/27 - 12/31)   - Final urine culture results contaminated growing >3 organisms.     # suprapubic carter: due to be exchanged, difficulty to do by home care nurse. Urology consulted.

## 2024-12-28 NOTE — PROGRESS NOTE ADULT - PROBLEM SELECTOR PLAN 3
- CTA head and neck finding b/l pulmonary embolism, pt on4L NC.   - Await official CTA chest. Pending.   - TTE: EF 45%, grade 1 diastolic dysfunction, severe pulm HTN 89mmHg.   - Heparin gtt. Currently refusing meds intermittently most likely d/t encephalopathy d/t infection, will plan to transition to PO Eliquis once medical condition improves.

## 2024-12-28 NOTE — DIETITIAN INITIAL EVALUATION ADULT - PROBLEM SELECTOR PLAN 3
- CTA head and neck finding b/l pulmonary embolism, pt on3L NC sating 95%    Plan:  - heparin ggt, f/u Pro BNP, trops, EKG with NSR and RBBB  - f/u CTA pe protocol  - TTE

## 2024-12-28 NOTE — DIETITIAN INITIAL EVALUATION ADULT - NS FNS DIET ORDER
Diet, Minced and Moist:   DASH/TLC {Sodium & Cholesterol Restricted} (DASH) (12-27-24 @ 13:52) [Active]

## 2024-12-28 NOTE — DIETITIAN INITIAL EVALUATION ADULT - PHYSCIAL ASSESSMENT
Daughter unknown pt's UBW. Current body weight 59.6kg (bed scaled, at time of visit on 12/28). Daughter unsure any weight changes.  Weight trend is fluctuated per current body weight 59.6kg (12/28) and [facility] weight history: 55.5kg(12/26); 55.3kg(11/25); 72.6kg(10/20, ? accuracy); 59kg(8/27); 59kg(7/10); 57.2kg(6/23).

## 2024-12-28 NOTE — DIETITIAN INITIAL EVALUATION ADULT - ORAL INTAKE PTA/DIET HISTORY
Met patient and RN at bedside. Patient is lethargic and confused. Obtained collateral from his daughter via phone and RN at bedside. Comprehensive chart review completed. Daughter reports patient has decreased  appetite / PO intake for a couple of days PTA. Prior use of Ensure nutrition shakes 2x daily and multivitamin daily PTA. No other reported issues.

## 2024-12-28 NOTE — DIETITIAN INITIAL EVALUATION ADULT - PERTINENT MEDS FT
MEDICATIONS  (STANDING):  aspirin  chewable 81 milliGRAM(s) Oral daily  carvedilol 6.25 milliGRAM(s) Oral every 12 hours  chlorhexidine 2% Cloths 1 Application(s) Topical daily  finasteride 5 milliGRAM(s) Oral daily  heparin  Infusion.  Unit(s)/Hr (11 mL/Hr) IV Continuous <Continuous>  latanoprost 0.005% Ophthalmic Solution 1 Drop(s) Both EYES at bedtime  multivitamin 1 Tablet(s) Oral daily  piperacillin/tazobactam IVPB.. 3.375 Gram(s) IV Intermittent every 8 hours  polyethylene glycol 3350 17 Gram(s) Oral two times a day  sodium chloride 0.9%. 1000 milliLiter(s) (50 mL/Hr) IV Continuous <Continuous>  tamsulosin 0.4 milliGRAM(s) Oral at bedtime  traZODone 50 milliGRAM(s) Oral at bedtime    MEDICATIONS  (PRN):  acetaminophen     Tablet .. 650 milliGRAM(s) Oral every 6 hours PRN Temp greater or equal to 38C (100.4F), Mild Pain (1 - 3)  aluminum hydroxide/magnesium hydroxide/simethicone Suspension 30 milliLiter(s) Oral every 4 hours PRN Dyspepsia  heparin   Injectable 4500 Unit(s) IV Push every 6 hours PRN For aPTT less than 40  heparin   Injectable 2000 Unit(s) IV Push every 6 hours PRN For aPTT between 40 - 57  LORazepam   Injectable 2 milliGRAM(s) IV Push once PRN Seizure  melatonin 3 milliGRAM(s) Oral at bedtime PRN Insomnia  ondansetron Injectable 4 milliGRAM(s) IV Push every 8 hours PRN Nausea and/or Vomiting

## 2024-12-28 NOTE — DIETITIAN INITIAL EVALUATION ADULT - PROBLEM SELECTOR PLAN 2
- UA+, + lactate  - s/p CTX in the ED    Plan:  - f/u procal, cultures, prior cultures with klebsiella + staph aureus  - start zosyn daily, narrow based on sensitivites

## 2024-12-29 LAB
ANION GAP SERPL CALC-SCNC: 10 MMOL/L — SIGNIFICANT CHANGE UP (ref 7–14)
APTT BLD: 90.8 SEC — HIGH (ref 24.5–35.6)
BUN SERPL-MCNC: 25 MG/DL — HIGH (ref 7–23)
CALCIUM SERPL-MCNC: 8.7 MG/DL — SIGNIFICANT CHANGE UP (ref 8.4–10.5)
CHLORIDE SERPL-SCNC: 107 MMOL/L — SIGNIFICANT CHANGE UP (ref 98–107)
CO2 SERPL-SCNC: 27 MMOL/L — SIGNIFICANT CHANGE UP (ref 22–31)
CREAT SERPL-MCNC: 1.09 MG/DL — SIGNIFICANT CHANGE UP (ref 0.5–1.3)
EGFR: 65 ML/MIN/1.73M2 — SIGNIFICANT CHANGE UP
GLUCOSE SERPL-MCNC: 92 MG/DL — SIGNIFICANT CHANGE UP (ref 70–99)
HCT VFR BLD CALC: 29.7 % — LOW (ref 39–50)
HGB BLD-MCNC: 8.2 G/DL — LOW (ref 13–17)
MAGNESIUM SERPL-MCNC: 2.2 MG/DL — SIGNIFICANT CHANGE UP (ref 1.6–2.6)
MCHC RBC-ENTMCNC: 24.5 PG — LOW (ref 27–34)
MCHC RBC-ENTMCNC: 27.6 G/DL — LOW (ref 32–36)
MCV RBC AUTO: 88.7 FL — SIGNIFICANT CHANGE UP (ref 80–100)
NRBC # BLD: 0 /100 WBCS — SIGNIFICANT CHANGE UP (ref 0–0)
NRBC # FLD: 0 K/UL — SIGNIFICANT CHANGE UP (ref 0–0)
PHOSPHATE SERPL-MCNC: 3 MG/DL — SIGNIFICANT CHANGE UP (ref 2.5–4.5)
PLATELET # BLD AUTO: 279 K/UL — SIGNIFICANT CHANGE UP (ref 150–400)
POTASSIUM SERPL-MCNC: 3.4 MMOL/L — LOW (ref 3.5–5.3)
POTASSIUM SERPL-SCNC: 3.4 MMOL/L — LOW (ref 3.5–5.3)
RBC # BLD: 3.35 M/UL — LOW (ref 4.2–5.8)
RBC # FLD: 17 % — HIGH (ref 10.3–14.5)
SODIUM SERPL-SCNC: 144 MMOL/L — SIGNIFICANT CHANGE UP (ref 135–145)
WBC # BLD: 5.45 K/UL — SIGNIFICANT CHANGE UP (ref 3.8–10.5)
WBC # FLD AUTO: 5.45 K/UL — SIGNIFICANT CHANGE UP (ref 3.8–10.5)

## 2024-12-29 PROCEDURE — 99233 SBSQ HOSP IP/OBS HIGH 50: CPT

## 2024-12-29 PROCEDURE — 95720 EEG PHY/QHP EA INCR W/VEEG: CPT

## 2024-12-29 RX ORDER — POTASSIUM CHLORIDE 600 MG/1
40 TABLET, FILM COATED, EXTENDED RELEASE ORAL ONCE
Refills: 0 | Status: COMPLETED | OUTPATIENT
Start: 2024-12-29 | End: 2024-12-29

## 2024-12-29 RX ADMIN — Medication 17 GRAM(S): at 05:46

## 2024-12-29 RX ADMIN — PIPERACILLIN AND TAZOBACTAM 25 GRAM(S): 3; .375 INJECTION, POWDER, LYOPHILIZED, FOR SOLUTION INTRAVENOUS at 05:57

## 2024-12-29 RX ADMIN — TRAZODONE HYDROCHLORIDE 50 MILLIGRAM(S): 150 TABLET ORAL at 21:05

## 2024-12-29 RX ADMIN — CARVEDILOL 6.25 MILLIGRAM(S): 25 TABLET, FILM COATED ORAL at 05:46

## 2024-12-29 RX ADMIN — HEPARIN SODIUM UNIT(S)/HR: 1000 INJECTION, SOLUTION INTRAVENOUS; SUBCUTANEOUS at 05:36

## 2024-12-29 RX ADMIN — Medication 1 TABLET(S): at 11:26

## 2024-12-29 RX ADMIN — Medication 5 MILLIGRAM(S): at 11:27

## 2024-12-29 RX ADMIN — HEPARIN SODIUM UNIT(S)/HR: 1000 INJECTION, SOLUTION INTRAVENOUS; SUBCUTANEOUS at 08:15

## 2024-12-29 RX ADMIN — LATANOPROST 1 DROP(S): 50 SOLUTION OPHTHALMIC at 21:05

## 2024-12-29 RX ADMIN — HEPARIN SODIUM UNIT(S)/HR: 1000 INJECTION, SOLUTION INTRAVENOUS; SUBCUTANEOUS at 19:23

## 2024-12-29 RX ADMIN — Medication 81 MILLIGRAM(S): at 11:27

## 2024-12-29 RX ADMIN — TAMSULOSIN HYDROCHLORIDE 0.4 MILLIGRAM(S): 0.4 CAPSULE ORAL at 21:05

## 2024-12-29 RX ADMIN — CHLORHEXIDINE GLUCONATE 1 APPLICATION(S): 1.2 RINSE ORAL at 11:30

## 2024-12-29 RX ADMIN — HEPARIN SODIUM UNIT(S)/HR: 1000 INJECTION, SOLUTION INTRAVENOUS; SUBCUTANEOUS at 07:20

## 2024-12-29 RX ADMIN — PIPERACILLIN AND TAZOBACTAM 25 GRAM(S): 3; .375 INJECTION, POWDER, LYOPHILIZED, FOR SOLUTION INTRAVENOUS at 13:25

## 2024-12-29 RX ADMIN — POTASSIUM CHLORIDE 40 MILLIEQUIVALENT(S): 600 TABLET, FILM COATED, EXTENDED RELEASE ORAL at 11:26

## 2024-12-29 RX ADMIN — CARVEDILOL 6.25 MILLIGRAM(S): 25 TABLET, FILM COATED ORAL at 17:32

## 2024-12-29 RX ADMIN — PIPERACILLIN AND TAZOBACTAM 25 GRAM(S): 3; .375 INJECTION, POWDER, LYOPHILIZED, FOR SOLUTION INTRAVENOUS at 21:14

## 2024-12-29 NOTE — PROGRESS NOTE ADULT - PROBLEM SELECTOR PLAN 6
- A&OX1-2 at baseline, A&Ox person currently (person/place on admission)  - minimal ambulation at baseline with assistance

## 2024-12-29 NOTE — CONSULT NOTE ADULT - ASSESSMENT
EKG - not in chart   2D echo show EF 45%, severe pulm HTN, mod to severe AS valve area 1.1 cm2 RV mildly dilated with decreased function    a/p     1) CAD s/p CABG - cont asa and coreg EF mildly reduced , please get 12 lead EKG , BNP > 12K but not in clinical CHF     2) B/L PE - on IV heparin , hemoglobin trending down, keep an eye , severe pulm HTN and reduced RV function, hemodynamically stable , no arrythmias on tele no sign of right heart failure    3) Mod to Sev AS - valve are 1.1cm2 on 2d echo with low gradients, pt not a candidate for intervention sec to dementia conservative management

## 2024-12-29 NOTE — CONSULT NOTE ADULT - SUBJECTIVE AND OBJECTIVE BOX
Brett Prince MD  Interventional Cardiology / Advance Heart Failure and Cardiac Transplant Specialist  [place] Office : [phone] [place] [address] [place] [address]  Tel:   [place] Office : [phone] [facility] [place] [address]  Tel: [phone]         HISTORY OF PRESENTING ILLNESS:  HPI:  89 y.o man with a PMHx significant for pmhx of dementia, CAD s/p CABG and stents, prior DVT, and BPH with chronic indwelling Schmidt catheter, PAD, right facial droop at baseline (no hx of CVA), presents to ED for right facial droop worsening from baseline and slurred speech x 2 days. As per chart review, pt brought in by Son for episodes of body 'freezing' and staring into space. He had an episode yesterday morning and noticed worsening right sided facial droop + slowed speech. No recent fevers, sick contacts, lives at home with an aid.     ED vitals: BP 170s- 190s systolic  Hypoxic and placed on NC 94%  CTA head and neck with no large vessel occlusion but CT showing b/l partially visualized PE, started on heparin drip  Pt found to have an elevated lactate of 4.1--> 2.1, treated with CTX for UTI    Cardiology called as 2D echo show EF 45%, severe pulm HTN, mod to severe AS valve area 1.1 cm2 RV mildly dilated with decreased function , pt is minimally verbal so good history not available     PAST MEDICAL & SURGICAL HISTORY:  CAD (coronary artery disease)      Essential hypertension      HLD (hyperlipidemia)      BPH (benign prostatic hyperplasia)      HTN (hypertension)      Dementia      CAD (coronary artery disease)      S/P CABG (coronary artery bypass graft)      Stenosis of right carotid artery  s/p endartectomy      S/P drug eluting coronary stent placement          SOCIAL HISTORY: Substance Use (street drugs): ( x ) never used  (  ) other:    FAMILY HISTORY:  FH: hypertension (Mother)         MEDICATIONS:  aspirin  chewable 81 milliGRAM(s) Oral daily  carvedilol 6.25 milliGRAM(s) Oral every 12 hours  heparin   Injectable 4500 Unit(s) IV Push every 6 hours PRN  heparin   Injectable 2000 Unit(s) IV Push every 6 hours PRN  heparin  Infusion.  Unit(s)/Hr IV Continuous <Continuous>  piperacillin/tazobactam IVPB.. 3.375 Gram(s) IV Intermittent every 8 hours  acetaminophen     Tablet .. 650 milliGRAM(s) Oral every 6 hours PRN  LORazepam   Injectable 2 milliGRAM(s) IV Push once PRN  melatonin 3 milliGRAM(s) Oral at bedtime PRN  ondansetron Injectable 4 milliGRAM(s) IV Push every 8 hours PRN  traZODone 50 milliGRAM(s) Oral at bedtime  aluminum hydroxide/magnesium hydroxide/simethicone Suspension 30 milliLiter(s) Oral every 4 hours PRN  polyethylene glycol 3350 17 Gram(s) Oral two times a day  finasteride 5 milliGRAM(s) Oral daily  chlorhexidine 2% Cloths 1 Application(s) Topical daily  latanoprost 0.005% Ophthalmic Solution 1 Drop(s) Both EYES at bedtime  multivitamin 1 Tablet(s) Oral daily  sodium chloride 0.9%. 1000 milliLiter(s) IV Continuous <Continuous>  tamsulosin 0.4 milliGRAM(s) Oral at bedtime      FAMILY HISTORY:  FH: hypertension (Mother)          Allergies    ramipril (Angioedema)    Intolerances    	      PHYSICAL EXAM:  T(C): 36.4 (12-29-24 @ 08:00), Max: 36.7 (12-28-24 @ 20:14)  HR: 70 (12-29-24 @ 08:00) (68 - 82)  BP: 119/81 (12-29-24 @ 08:00) (118/83 - 173/90)  RR: 18 (12-29-24 @ 08:00) (18 - 18)  SpO2: 100% (12-29-24 @ 08:00) (100% - 100%)  Wt(kg): --  I&O's Summary    28 Dec 2024 07:01  -  29 Dec 2024 07:00  --------------------------------------------------------  IN: 0 mL / OUT: 725 mL / NET: -725 mL        GENERAL: NAD   EYES: EOMI, PERRLA, conjunctiva and sclera clear  ENMT: No tonsillar erythema, exudates, or enlargement; Moist mucous membranes, Good dentition, No lesions  Cardiovascular: Normal S1 S2, No JVD, 2/6 ejection systolic  murmur, No edema  Respiratory: Lungs clear to auscultation	  Gastrointestinal:  Soft, Non-tender, + BS	  Extremities: no edema      LABS:	 	    CARDIAC MARKERS:                                  8.2    5.45  )-----------( 279      ( 29 Dec 2024 06:37 )             29.7     12-29    144  |  107  |  25[H]  ----------------------------<  92  3.4[L]   |  27  |  1.09    Ca    8.7      29 Dec 2024 06:37  Phos  3.0     12-29  Mg     2.20     12-29      proBNP:   Lipid Profile:   HgA1c:   TSH:     Consultant(s) Notes Reviewed:  [x ] YES  [ ] NO    Care Discussed with Consultants/Other Providers [ x] YES  [ ] NO    Imaging Personally Reviewed independently:  [x] YES  [ ] NO    All labs, radiologic studies, vitals, orders and medications list reviewed. Patient is seen and examined at bedside. Case discussed with medical team.    ASSESSMENT/PLAN:

## 2024-12-29 NOTE — PROGRESS NOTE ADULT - PROBLEM SELECTOR PLAN 3
- CTA head and neck finding b/l pulmonary embolism, pt on4L NC.   - Await official CTA chest. Pending.   - TTE: EF 45%, grade 1 diastolic dysfunction, severe pulm HTN 89mmHg.   - Heparin gtt. Currently refusing meds intermittently most likely d/t encephalopathy d/t infection, will plan to transition to PO Eliquis once medical condition improves.  - Monitoring H/H while pt on heparin gtt (numbers slowly

## 2024-12-29 NOTE — PROGRESS NOTE ADULT - PROBLEM SELECTOR PLAN 8
- continue finasteride 5mg QD and Flomax 0.4mg QHS.    # Glaucoma: continue home Latanoprost.   # Insomnia: on Trazodone at home, continuing as 50mg QHS for now (admission med rec appears to have older meds on it as well), will confirm dose w/ family

## 2024-12-29 NOTE — CHART NOTE - NSCHARTNOTEFT_GEN_A_CORE
Initial 2 hours of record reviewed.  There are no seizures noted. Background is continuous.   Full report to follow after review of completed study tomorrow.     CLAUDIA Mcdonnell.  Attending Physician, [facility]      [facility] EEG Reading Room Ph#: [phone]  Epilepsy Answering Service after 5PM and before 8:30AM: Ph#: [phone] Needs appointment for yearly

## 2024-12-29 NOTE — PROGRESS NOTE ADULT - PROBLEM SELECTOR PLAN 9
Diet: minced and moist, DASH   DVT: heparin ggt  Dispo: TBD    12/27: GOC discussion by ACP w/ daughter. Patient was DNR/DNI w/ outpt provider, but daughter did not feel comfortable signing updated MOLST inpatient. She will bring old MOLST. She wants to keep patient FC in the interim. I will also discuss w/ daughter myself.  12/28: Son brought in MOLST DNR w/ trial of intubation  12/29: Updated son and daughter over phone

## 2024-12-29 NOTE — PROGRESS NOTE ADULT - PROBLEM SELECTOR PLAN 2
- Started on Zosyn empirically (culture showing Klebsiella and staph) but pt also w/ concern of possible PNA.   - On Zosyn. Would treat x 5d total (12/27 - 12/31)     # suprapubic carter: due to be exchanged, difficulty to do by home care nurse.   - Exchanged by urology on 12/27

## 2024-12-30 DIAGNOSIS — I35.0 NONRHEUMATIC AORTIC (VALVE) STENOSIS: ICD-10-CM

## 2024-12-30 DIAGNOSIS — I63.9 CEREBRAL INFARCTION, UNSPECIFIED: ICD-10-CM

## 2024-12-30 LAB
ANION GAP SERPL CALC-SCNC: 9 MMOL/L — SIGNIFICANT CHANGE UP (ref 7–14)
APTT BLD: 65.2 SEC — HIGH (ref 24.5–35.6)
BUN SERPL-MCNC: 21 MG/DL — SIGNIFICANT CHANGE UP (ref 7–23)
CALCIUM SERPL-MCNC: 8.9 MG/DL — SIGNIFICANT CHANGE UP (ref 8.4–10.5)
CHLORIDE SERPL-SCNC: 105 MMOL/L — SIGNIFICANT CHANGE UP (ref 98–107)
CO2 SERPL-SCNC: 26 MMOL/L — SIGNIFICANT CHANGE UP (ref 22–31)
CREAT SERPL-MCNC: 1.03 MG/DL — SIGNIFICANT CHANGE UP (ref 0.5–1.3)
EGFR: 69 ML/MIN/1.73M2 — SIGNIFICANT CHANGE UP
GLUCOSE SERPL-MCNC: 79 MG/DL — SIGNIFICANT CHANGE UP (ref 70–99)
HCT VFR BLD CALC: 29.2 % — LOW (ref 39–50)
HGB BLD-MCNC: 8.5 G/DL — LOW (ref 13–17)
MAGNESIUM SERPL-MCNC: 2.1 MG/DL — SIGNIFICANT CHANGE UP (ref 1.6–2.6)
MCHC RBC-ENTMCNC: 24.7 PG — LOW (ref 27–34)
MCHC RBC-ENTMCNC: 29.1 G/DL — LOW (ref 32–36)
MCV RBC AUTO: 84.9 FL — SIGNIFICANT CHANGE UP (ref 80–100)
NRBC # BLD: 0 /100 WBCS — SIGNIFICANT CHANGE UP (ref 0–0)
NRBC # FLD: 0 K/UL — SIGNIFICANT CHANGE UP (ref 0–0)
PHOSPHATE SERPL-MCNC: 2.7 MG/DL — SIGNIFICANT CHANGE UP (ref 2.5–4.5)
PLATELET # BLD AUTO: 243 K/UL — SIGNIFICANT CHANGE UP (ref 150–400)
POTASSIUM SERPL-MCNC: 3.5 MMOL/L — SIGNIFICANT CHANGE UP (ref 3.5–5.3)
POTASSIUM SERPL-SCNC: 3.5 MMOL/L — SIGNIFICANT CHANGE UP (ref 3.5–5.3)
RBC # BLD: 3.44 M/UL — LOW (ref 4.2–5.8)
RBC # FLD: 16.9 % — HIGH (ref 10.3–14.5)
SODIUM SERPL-SCNC: 140 MMOL/L — SIGNIFICANT CHANGE UP (ref 135–145)
WBC # BLD: 4.71 K/UL — SIGNIFICANT CHANGE UP (ref 3.8–10.5)
WBC # FLD AUTO: 4.71 K/UL — SIGNIFICANT CHANGE UP (ref 3.8–10.5)

## 2024-12-30 PROCEDURE — 70551 MRI BRAIN STEM W/O DYE: CPT | Mod: 26

## 2024-12-30 PROCEDURE — 99233 SBSQ HOSP IP/OBS HIGH 50: CPT

## 2024-12-30 PROCEDURE — 71275 CT ANGIOGRAPHY CHEST: CPT | Mod: 26

## 2024-12-30 RX ADMIN — Medication 81 MILLIGRAM(S): at 11:10

## 2024-12-30 RX ADMIN — HEPARIN SODIUM UNIT(S)/HR: 1000 INJECTION, SOLUTION INTRAVENOUS; SUBCUTANEOUS at 07:25

## 2024-12-30 RX ADMIN — CARVEDILOL 6.25 MILLIGRAM(S): 25 TABLET, FILM COATED ORAL at 05:21

## 2024-12-30 RX ADMIN — Medication 17 GRAM(S): at 05:21

## 2024-12-30 RX ADMIN — Medication 5 MILLIGRAM(S): at 11:10

## 2024-12-30 RX ADMIN — Medication 1 TABLET(S): at 11:10

## 2024-12-30 RX ADMIN — CARVEDILOL 6.25 MILLIGRAM(S): 25 TABLET, FILM COATED ORAL at 17:21

## 2024-12-30 RX ADMIN — CHLORHEXIDINE GLUCONATE 1 APPLICATION(S): 1.2 RINSE ORAL at 11:15

## 2024-12-30 RX ADMIN — HEPARIN SODIUM UNIT(S)/HR: 1000 INJECTION, SOLUTION INTRAVENOUS; SUBCUTANEOUS at 19:06

## 2024-12-30 RX ADMIN — TRAZODONE HYDROCHLORIDE 50 MILLIGRAM(S): 150 TABLET ORAL at 21:20

## 2024-12-30 RX ADMIN — HEPARIN SODIUM UNIT(S)/HR: 1000 INJECTION, SOLUTION INTRAVENOUS; SUBCUTANEOUS at 07:15

## 2024-12-30 RX ADMIN — ACETAMINOPHEN 650 MILLIGRAM(S): 80 SOLUTION/ DROPS ORAL at 05:21

## 2024-12-30 RX ADMIN — LATANOPROST 1 DROP(S): 50 SOLUTION OPHTHALMIC at 21:19

## 2024-12-30 RX ADMIN — TAMSULOSIN HYDROCHLORIDE 0.4 MILLIGRAM(S): 0.4 CAPSULE ORAL at 21:20

## 2024-12-30 RX ADMIN — PIPERACILLIN AND TAZOBACTAM 25 GRAM(S): 3; .375 INJECTION, POWDER, LYOPHILIZED, FOR SOLUTION INTRAVENOUS at 05:21

## 2024-12-30 NOTE — EEG REPORT - NS EEG TEXT BOX
[facility]   COMPREHENSIVE EPILEPSY CENTER   REPORT OF LONG-TERM VIDEO EEG     [facility]: [address], [place] [address], Ph#: [phone]  [facility]: [address] [place] [address], Ph#: [phone]  [facility]: [address] [place] [address], Ph#: [phone]    Patient Name: SANDEE WILSON  Age and : 89y (35)  MRN #: [id number]  Location: [facility] [address]  Referring Physician: Hi Ware    Start Time/Date: 14:42 on   End Time/Date: 08:00 on   Duration: 19 hours 18 minutes    _____________________________________________________________  STUDY INFORMATION    EEG Recording Technique:  The patient underwent continuous Video-EEG monitoring, using Telemetry System hardware on the XLTek Digital System. EEG and video data were stored on a computer hard drive with important events saved in digital archive files. The material was reviewed by a physician (electroencephalographer / epileptologist) on a daily basis. Rhys and seizure detection algorithms were utilized and reviewed. An EEG Technician attended to the patient, and was available throughout daytime work hours.  The epilepsy center neurologist was available in person or on call 24-hours per day.    EEG Placement and Labeling of Electrodes:  The EEG was performed utilizing 20 channel referential EEG connections (coronal over temporal over parasagittal montage) using all standard 10-20 electrode placements with EKG, with additional electrodes placed in the inferior temporal region using the modified 10-10 montage electrode placements for elective admissions, or if deemed necessary. Recording was at a sampling rate of 256 samples per second per channel. Time synchronized digital video recording was done simultaneously with EEG recording. A low light infrared camera was used for low light recording.     _____________________________________________________________  HISTORY    Patient is a 89y old  Male who presents with a chief complaint of weakness (29 Dec 2024 15:23)      PERTINENT MEDICATION:  MEDICATIONS  (STANDING):  aspirin  chewable 81 milliGRAM(s) Oral daily  carvedilol 6.25 milliGRAM(s) Oral every 12 hours  chlorhexidine 2% Cloths 1 Application(s) Topical daily  finasteride 5 milliGRAM(s) Oral daily  heparin  Infusion.  Unit(s)/Hr (11 mL/Hr) IV Continuous <Continuous>  latanoprost 0.005% Ophthalmic Solution 1 Drop(s) Both EYES at bedtime  multivitamin 1 Tablet(s) Oral daily  polyethylene glycol 3350 17 Gram(s) Oral two times a day  tamsulosin 0.4 milliGRAM(s) Oral at bedtime  traZODone 50 milliGRAM(s) Oral at bedtime    _____________________________________________________________  STUDY INTERPRETATION      FINDINGS:      Background:  Continuity: continuous  Symmetry: symmetric  PDR: 8 Hz activity, with amplitude to 40 uV, that attenuated to eye opening.  Low amplitude frontal beta noted in wakefulness.  Reactivity: present  Voltage: normal, mostly 20-150uV  Anterior Posterior Gradient: present  Other background findings: none  Breach: absent    Background Slowing:  Generalized slowing: mild, with predominantly alpha/theta frequencies.  Focal slowing: none was present.    State Changes:   -Drowsiness noted with increased slowing, attenuation of fast activity, vertex transients.  -Present with N2 sleep transients with symmetric spindles and K-complexes.    Sporadic Epileptiform Discharges:    None    Rhythmic and Periodic Patterns (RPPs):  None     Electrographic and Electroclinical seizures:  None    Other Clinical Events:  None    Activation Procedures:   -Hyperventilation was not performed.    -Photic stimulation was not performed.    Artifacts:  Intermittent myogenic and movement artifacts were noted.    ECG:  The heart rate on single channel ECG was predominantly ~70 bpm.    Summary:  Abnormal EEG in the awake / drowsy / asleep state(s).  Mild generalized background slowing.    Clinical Impression:  This finding is consistent with a mild degree of diffuse or multifocal cerebral dysfunction.  There were no epileptiform abnormalities recorded.          -------------------------------------------------------------------------------------------------------    Brittney Hamm MD  Director, Epilepsy - [facility]    Questions please call [phone]  After hours (5 PM - 8:30 AM) please call the epilepsy answering service at [phone]       [facility]   COMPREHENSIVE EPILEPSY CENTER   REPORT OF LONG-TERM VIDEO EEG     [facility]: [address], [place] [address], Ph#: [phone]  [facility]: [address] [address] [place] [address], Ph#: [phone]  [facility]: [address] [place] [address], Ph#: [phone]    Patient Name: SANDEE WILSON  Age and : 89y (35)  MRN #: [id number]  Location: [facility] [address]  Referring Physician: Hi Ware    Start Time/Date: 14:42 on   End Time/Date: 10:25 on   Duration: 21 hours 43 minutes    _____________________________________________________________  STUDY INFORMATION    EEG Recording Technique:  The patient underwent continuous Video-EEG monitoring, using Telemetry System hardware on the XLTek Digital System. EEG and video data were stored on a computer hard drive with important events saved in digital archive files. The material was reviewed by a physician (electroencephalographer / epileptologist) on a daily basis. Rhys and seizure detection algorithms were utilized and reviewed. An EEG Technician attended to the patient, and was available throughout daytime work hours.  The epilepsy center neurologist was available in person or on call 24-hours per day.    EEG Placement and Labeling of Electrodes:  The EEG was performed utilizing 20 channel referential EEG connections (coronal over temporal over parasagittal montage) using all standard 10-20 electrode placements with EKG, with additional electrodes placed in the inferior temporal region using the modified 10-10 montage electrode placements for elective admissions, or if deemed necessary. Recording was at a sampling rate of 256 samples per second per channel. Time synchronized digital video recording was done simultaneously with EEG recording. A low light infrared camera was used for low light recording.     _____________________________________________________________  HISTORY    Patient is a 89y old  Male who presents with a chief complaint of weakness (29 Dec 2024 15:23)      PERTINENT MEDICATION:  MEDICATIONS  (STANDING):  aspirin  chewable 81 milliGRAM(s) Oral daily  carvedilol 6.25 milliGRAM(s) Oral every 12 hours  chlorhexidine 2% Cloths 1 Application(s) Topical daily  finasteride 5 milliGRAM(s) Oral daily  heparin  Infusion.  Unit(s)/Hr (11 mL/Hr) IV Continuous <Continuous>  latanoprost 0.005% Ophthalmic Solution 1 Drop(s) Both EYES at bedtime  multivitamin 1 Tablet(s) Oral daily  polyethylene glycol 3350 17 Gram(s) Oral two times a day  tamsulosin 0.4 milliGRAM(s) Oral at bedtime  traZODone 50 milliGRAM(s) Oral at bedtime    _____________________________________________________________  STUDY INTERPRETATION      FINDINGS:      Background:  Continuity: continuous  Symmetry: symmetric  PDR: 8 Hz activity, with amplitude to 40 uV, that attenuated to eye opening.  Low amplitude frontal beta noted in wakefulness.  Reactivity: present  Voltage: normal, mostly 20-150uV  Anterior Posterior Gradient: present  Other background findings: none  Breach: absent    Background Slowing:  Generalized slowing: mild, with predominantly alpha/theta frequencies.  Focal slowing: none was present.    State Changes:   -Drowsiness noted with increased slowing, attenuation of fast activity, vertex transients.  -Present with N2 sleep transients with symmetric spindles and K-complexes.    Sporadic Epileptiform Discharges:    None    Rhythmic and Periodic Patterns (RPPs):  None     Electrographic and Electroclinical seizures:  None    Other Clinical Events:  None    Activation Procedures:   -Hyperventilation was not performed.    -Photic stimulation was not performed.    Artifacts:  Intermittent myogenic and movement artifacts were noted.    ECG:  The heart rate on single channel ECG was predominantly ~70 bpm.    Summary:  Abnormal EEG in the awake / drowsy / asleep state(s).  Mild generalized background slowing.    Clinical Impression:  This finding is consistent with a mild degree of diffuse or multifocal cerebral dysfunction.  There were no epileptiform abnormalities recorded.          -------------------------------------------------------------------------------------------------------    Brittney Hamm MD  Director, Epilepsy - [facility]    Questions please call [phone]  After hours (5 PM - 8:30 AM) please call the epilepsy answering service at [phone]

## 2024-12-30 NOTE — PROGRESS NOTE ADULT - PROBLEM SELECTOR PLAN 2
concern for acute CVA  - EEG performed - no evidence of SZ D/O  - Continue ASA  - MR Brain pending to evaluate for possibility of acute CVA.

## 2024-12-30 NOTE — PROGRESS NOTE ADULT - ASSESSMENT
EKG - NSR RBBB unchanged    2D echo show EF 45%, severe pulm HTN, mod to severe AS valve area 1.1 cm2 RV mildly dilated with decreased function    a/p     1) CAD s/p CABG - cont asa and coreg EF mildly reduced , EKG unchanged , BNP > 12K but not in clinical CHF     2) B/L PE - on IV heparin , hemoglobin trending down, keep an eye , severe pulm HTN and reduced RV function, hemodynamically stable , no arrythmias on tele no sign of right heart failure    3) Mod to Sev AS - valve are 1.1cm2 on 2d echo with low gradients, pt not a candidate for intervention sec to dementia conservative management

## 2024-12-30 NOTE — SWALLOW BEDSIDE ASSESSMENT ADULT - H & P REVIEW
yes 89 y.o man with a PMHx significant for pmhx of dementia, CAD s/p CABG and stents, prior DVT, and BPH with chronic indwelling Schmidt catheter, PAD, right facial droop at baseline (no hx of CVA), presents to ED for right facial droop worsening from baseline and slurred speech x 2 days. As per chart review, pt brought in by Son for episodes of body 'freezing' and staring into space. He had an episode yesterday morning and noticed worsening right sided facial droop + slowed speech. No recent fevers, sick contacts, lives at home with an aid./yes

## 2024-12-30 NOTE — PROGRESS NOTE ADULT - SUBJECTIVE AND OBJECTIVE BOX
[facility] Division of Hospital Medicine  David Jon MD  Contact via [employer] Teams (Mon-Fri 8AM-4PM)  Otherwise: contact Hospitalist in Charge at [phone]    Patient is a 89y old  Male who presents with a chief complaint of weakness (29 Dec 2024 15:23)    SUBJECTIVE / OVERNIGHT EVENTS:  Limited ROS+H&P due to baseline dementia.  No overnight issues with F/C, vomiting, SOB, Cough, diarrhea.      MEDICATIONS  (STANDING):  aspirin  chewable 81 milliGRAM(s) Oral daily  carvedilol 6.25 milliGRAM(s) Oral every 12 hours  chlorhexidine 2% Cloths 1 Application(s) Topical daily  finasteride 5 milliGRAM(s) Oral daily  heparin  Infusion.  Unit(s)/Hr (11 mL/Hr) IV Continuous <Continuous>  latanoprost 0.005% Ophthalmic Solution 1 Drop(s) Both EYES at bedtime  multivitamin 1 Tablet(s) Oral daily  polyethylene glycol 3350 17 Gram(s) Oral two times a day  tamsulosin 0.4 milliGRAM(s) Oral at bedtime  traZODone 50 milliGRAM(s) Oral at bedtime    MEDICATIONS  (PRN):  acetaminophen     Tablet .. 650 milliGRAM(s) Oral every 6 hours PRN Temp greater or equal to 38C (100.4F), Mild Pain (1 - 3)  aluminum hydroxide/magnesium hydroxide/simethicone Suspension 30 milliLiter(s) Oral every 4 hours PRN Dyspepsia  heparin   Injectable 4500 Unit(s) IV Push every 6 hours PRN For aPTT less than 40  heparin   Injectable 2000 Unit(s) IV Push every 6 hours PRN For aPTT between 40 - 57  LORazepam   Injectable 2 milliGRAM(s) IV Push once PRN Seizure  melatonin 3 milliGRAM(s) Oral at bedtime PRN Insomnia  ondansetron Injectable 4 milliGRAM(s) IV Push every 8 hours PRN Nausea and/or Vomiting      Vital Signs Last 24 Hrs  T(C): 36.4 (30 Dec 2024 12:00), Max: 36.9 (29 Dec 2024 16:00)  T(F): 97.6 (30 Dec 2024 12:00), Max: 98.4 (29 Dec 2024 16:00)  HR: 68 (30 Dec 2024 12:00) (68 - 78)  BP: 110/65 (30 Dec 2024 12:00) (110/65 - 151/72)  BP(mean): --  RR: 17 (30 Dec 2024 12:00) (17 - 19)  SpO2: 100% (30 Dec 2024 12:00) (98% - 100%)    Parameters below as of 30 Dec 2024 12:00  Patient On (Oxygen Delivery Method): nasal cannula  O2 Flow (L/min): 2    CAPILLARY BLOOD GLUCOSE        I&O's Summary      PHYSICAL EXAM:  CONSTITUTIONAL: NAD, frail  EYES: PERRLA; conjunctiva and sclera clear  ENMT: Moist oral mucosa, no pharyngeal injection or exudates; normal dentition  NECK: Supple, no palpable masses; no thyromegaly  RESPIRATORY: Normal respiratory effort; lungs are clear to auscultation bilaterally  CARDIOVASCULAR: Regular rate and rhythm, normal S1 and S2, no murmur/rub/gallop; No lower extremity edema; Peripheral pulses are 2+ bilaterally  ABDOMEN: Nontender to palpation, normoactive bowel sounds, no rebound/guarding; No hepatosplenomegaly  MUSCULOSKELETAL:  Did not assess gait; no clubbing or cyanosis of digits; no joint swelling or tenderness to palpation  PSYCH: A+Ox0; calm  NEUROLOGY: Unable to follow commands for neuro exam  SKIN: No rashes; no palpable lesions    LABS:                        8.5    4.71  )-----------( 243      ( 30 Dec 2024 04:40 )             29.2     12-30    140  |  105  |  21  ----------------------------<  79  3.5   |  26  |  1.03    Ca    8.9      30 Dec 2024 04:40  Phos  2.7     12-30  Mg     2.10     12-30      PTT - ( 30 Dec 2024 04:40 )  PTT:65.2 sec      Urinalysis Basic - ( 30 Dec 2024 04:40 )    Color: x / Appearance: x / SG: x / pH: x  Gluc: 79 mg/dL / Ketone: x  / Bili: x / Urobili: x   Blood: x / Protein: x / Nitrite: x   Leuk Esterase: x / RBC: x / WBC x   Sq Epi: x / Non Sq Epi: x / Bacteria: x        RADIOLOGY & ADDITIONAL TESTS:    Imaging Personally Reviewed:    Care Discussed with Consultants/Other Providers:

## 2024-12-30 NOTE — SWALLOW BEDSIDE ASSESSMENT ADULT - POSITIONING
Amoxicillin (By mouth)   Amoxicillin (p-abd-g-JANE-in)  Treats infections or stomach ulcers  This medicine is a penicillin antibiotic  Brand Name(s): Amoxil, Moxatag, Omeclamox-Arnold, Prevpac, Triple Therapy   There may be other brand names for this medicine  When This Medicine Should Not Be Used: This medicine is not right for everyone  You should not use it if you had an allergic reaction to amoxicillin, any type of penicillin, or a cephalosporin antibiotic  How to Use This Medicine:   Capsule, Liquid, Tablet, Chewable Tablet, Long Acting Tablet  · Your doctor will tell you how much medicine to use  Do not use more than directed  · Chewable tablet: You must chew the tablet before you swallow it  You may crush the tablet and mix the medicine with a small amount of food to make it easier to swallow  · Oral liquid: Shake well just before each use  · Measure the oral liquid medicine with a marked measuring spoon, oral syringe, or medicine cup  You may mix the oral liquid with a baby formula, milk, fruit juice, water, ginger ale, or another cold drink  Be sure your child drinks all of the mixture right away  · Tablet for suspension: Place the tablet in a small drinking glass, and add 2 teaspoons of water  Do not use any other liquid  Gently stir or swirl the water in the glass until the tablet is completely dissolved  Drink all of this mixture right away  Add more water to the glass and drink all of it to make sure you get all of the medicine  Do not chew or swallow the tablet for suspension  · Take all of the medicine in your prescription to clear up your infection, even if you feel better after the first few doses  · Take a dose as soon as you remember  If it is almost time for your next dose, wait until then and take a regular dose  Do not take extra medicine to make up for a missed dose    · Store the tablets, capsules, and tablets for suspension at room temperature, away from heat, moisture, and direct light   · Store the oral liquid in the refrigerator  Do not freeze  Throw away any unused medicine after 14 days  Drugs and Foods to Avoid:   Ask your doctor or pharmacist before using any other medicine, including over-the-counter medicines, vitamins, and herbal products  · Some medicines can affect how amoxicillin works  Tell your doctor if you are also using any of the following:   ¨ Allopurinol  ¨ Probenecid  ¨ Birth control pills  ¨ A blood thinner  Warnings While Using This Medicine:   · Tell your doctor if you are pregnant or breastfeeding, or if you have kidney disease, allergies, or a condition called phenylketonuria (PKU)  Tell your doctor if you are on dialysis  · This medicine can cause diarrhea  Call your doctor if the diarrhea becomes severe, does not stop, or is bloody  Do not take any medicine to stop diarrhea until you have talked to your doctor  Diarrhea can occur 2 months or more after you stop taking this medicine  · Tell any doctor or dentist who treats you that you are using this medicine  This medicine may affect certain medical test results  · Call your doctor if your symptoms do not improve or if they get worse  · Use this medicine to treat only the infection your doctor has prescribed it for  Do not use this medicine for any infection or condition that has not been checked by a doctor  This medicine will not treat the flu or the common cold  · Keep all medicine out of the reach of children  Never share your medicine with anyone    Possible Side Effects While Using This Medicine:   Call your doctor right away if you notice any of these side effects:  · Allergic reaction: Itching or hives, swelling in your face or hands, swelling or tingling in your mouth or throat, chest tightness, trouble breathing  · Blistering, peeling, or red skin rash  · Diarrhea that may contain blood, stomach cramps, fever  If you notice these less serious side effects, talk with your doctor:   · Mild diarrhea, nausea, or vomiting  · Mild skin rash  If you notice other side effects that you think are caused by this medicine, tell your doctor  Call your doctor for medical advice about side effects  You may report side effects to FDA at 1-931-GAZ-7626  © 2017 [address] [employer] Information is for End User's use only and may not be sold, redistributed or otherwise used for commercial purposes  The above information is an  only  It is not intended as medical advice for individual conditions or treatments  Talk to your doctor, nurse or pharmacist before following any medical regimen to see if it is safe and effective for you  Ondansetron (By mouth, Into the mouth)   Ondansetron (on-DAN-se-bianka)  Prevents nausea and vomiting  Brand Name(s): Zofran, Zofran ODT, Zuplenz   There may be other brand names for this medicine  When This Medicine Should Not Be Used: This medicine is not right for everyone  Do not use it if you had an allergic reaction to ondansetron  How to Use This Medicine: Thin Sheet, Liquid, Tablet, Dissolving Tablet  · Your doctor will tell you how much medicine to use  Do not use more than directed  · Measure the oral liquid medicine with a marked measuring spoon, oral syringe, or medicine cup  · To use the disintegrating tablet:   ¨ Do not open the blister pack that contains the tablet until you are ready to take it  ¨ Make sure your hands are dry  Peel back the foil, then remove the tablet from the blister pack  Do not push the tablet through the foil  ¨ Place the tablet on top of your tongue where it will dissolve in seconds  After the tablet has melted, swallow or take a sip of water  · To use the soluble film:   ¨ Make sure your hands are clean and dry  ¨ Fold the pouch along the dotted line  ¨ While still folded, tear the pouch carefully along the edge  Remove the film from the pouch  ¨ Place the film on top of your tongue  It will dissolve in 4 to 20 seconds  Do not chew or swallow the film whole  ¨ After the film has dissolved, you may swallow with or without water  · Read and follow the patient instructions that come with this medicine  Talk to your doctor or pharmacist if you have any questions  · Missed dose: Take a dose as soon as you remember  If it is almost time for your next dose, wait until then and take a regular dose  Do not take extra medicine to make up for a missed dose  · Store the medicine in a closed container at room temperature, away from heat, moisture, and direct light  Keep the soluble film in the foil pouch until you ready to use it  Drugs and Foods to Avoid:   Ask your doctor or pharmacist before using any other medicine, including over-the-counter medicines, vitamins, and herbal products  · Do not use this medicine together with apomorphine  · Some medicines may affect how ondansetron works  Tell your doctor if you are using tramadol, diuretics (water pills), or any other medicine for nausea and vomiting  Warnings While Using This Medicine:   · Tell your doctor if you are pregnant or breastfeeding, or if you have liver disease, congestive heart failure, heart rhythm problems (such as prolonged QT interval, slow heartbeat), low magnesium or potassium levels, stomach or bowel problems, or phenylketonuria (PKU)  · This medicine may cause heart rhythm problems (such as QT prolongation)  · This medicine may make you dizzy  Do not drive or do anything else that could be dangerous until you know how this medicine affects you  · Keep all medicine out of the reach of children  Never share your medicine with anyone    Possible Side Effects While Using This Medicine:   Call your doctor right away if you notice any of these side effects:  · Allergic reaction: Itching or hives, swelling in your face or hands, swelling or tingling in your mouth or throat, chest tightness, trouble breathing  · Fainting, dizziness, or lightheadedness  · Fast, pounding, or uneven heartbeat  · Trouble breathing  If you notice these less serious side effects, talk with your doctor:   · Constipation or diarrhea  · Headache  · Tiredness or weakness  If you notice other side effects that you think are caused by this medicine, tell your doctor  Call your doctor for medical advice about side effects  You may report side effects to FDA at 8-415-FDA-0060  © 2017 [address] [employer]  Information is for End User's use only and may not be sold, redistributed or otherwise used for commercial purposes  The above information is an  only  It is not intended as medical advice for individual conditions or treatments  Talk to your doctor, nurse or pharmacist before following any medical regimen to see if it is safe and effective for you  Pneumonia in Children   WHAT YOU NEED TO KNOW:   What is pneumonia? Pneumonia is an infection in one or both lungs  Pneumonia can be caused by bacteria, viruses, fungi, or parasites  Viruses are usually the cause of pneumonia in children  Children with viral pneumonia can also develop bacterial pneumonia  Often, pneumonia begins after an infection of the upper respiratory tract (nose and throat)  This causes fluid to collect in the lungs, making it hard to breathe  Pneumonia can also develop if foreign material, such as food or stomach acid, is inhaled into the lungs  What may increase my child's risk for pneumonia? · Premature birth    · Breathing secondhand smoke    · Asthma or certain genetic disorders, such as sickle-cell anemia     · Heart defects, such as ventricular septal defect (VSD), atrial septal defect (ASD), or patent ductus arteriosus (PDA)    · Poor nutrition    · A weak immune system    · Spending time in a crowded place, such as a  center  What are the signs and symptoms of pneumonia? The signs and symptoms depend on your child's age and the cause of his or her pneumonia   The signs and symptoms of bacterial pneumonia usually begin more quickly than they do with viral pneumonia  Your child may have any of the following:  · Fever or chills     · Cough     · Shortness of breath or trouble breathing    · Chest pain when your child coughs or breathes deeply    · Abdominal pain near your child's ribs    · Poor appetite    · Crying more than usual, or more irritable or fussy than normal    · Pale or bluish lips, fingernails, or toenails  How do I know if my child is having trouble breathing? · Your child's nostrils open wider when he or she breathes in     · Your child's skin between his or her ribs and around his or her neck pulls in with each breath  · Your child is wheezing, which means you hear a high-pitched noise when he or she breathes out  · Your child is breathing fast:    ¨ More than 60 breaths in 1 minute for  babies up to 3 months old    ¨ More than 50 breaths in 1 minute for a baby 2 months to 13 months old    ¨ More than 40 breaths in 1 minute for a child older than 1 to 5 years    ¨ More than 20 breaths in 1 minute for a child older than 5 years  How is pneumonia diagnosed? Your child's healthcare provider will examine your child and listen to his or her lungs  Tell the provider if your child has other health conditions  Your child may also need any of the following:  · A chest x-ray  may show signs of infection in your child's lungs  · Blood tests  may show signs of an infection or the bacteria causing your child's pneumonia  · A mucus sample  is collected and tested for the germ that is causing your child's illness  It can help your child's healthcare provider choose the best medicine to treat the infection  · Pulse oximetry  measures the amount of oxygen in your child's blood  How is pneumonia treated? If your child's pneumonia is severe, the healthcare provider may want your child to stay in the hospital for treatment   Trouble breathing, dehydration, high fever, and the need for oxygen are reasons to stay in the hospital   · Antibiotics  may be given if your child has bacterial pneumonia  · NSAIDs , such as ibuprofen, help decrease swelling, pain, and fever  This medicine is available with or without a doctor's order  NSAIDs can cause stomach bleeding or kidney problems in certain people  If your child takes blood thinner medicine, always ask if NSAIDs are safe for him  Always read the medicine label and follow directions  Do not give these medicines to children under 10months of age without direction from your child's healthcare provider  · Acetaminophen  decreases pain and fever  It is available without a doctor's order  Ask how much to give your child and how often to give it  Follow directions  Read the labels of all other medicines your child uses to see if they also contain acetaminophen, or ask your child's doctor or pharmacist  Acetaminophen can cause liver damage if not taken correctly  · Your child may need extra oxygen  if his blood oxygen level is lower than it should be  Your child may get oxygen through a mask placed over his nose and mouth or through small tubes placed in his nostrils  Ask your child's healthcare provider before you take off the mask or oxygen tubing  How can I manage my child's symptoms? · Let your child rest and sleep as much as possible  Your child may be more tired than usual  Rest and sleep help your child's body heal     · Give your child liquids as directed  Liquids help your child to loosen mucus and keeps him or her from becoming dehydrated  Ask how much liquid your child should drink each day and which liquids are best for him or her  Your child's healthcare provider may recommend water, apple juice, gelatin, broth, and popsicles  · Use a cool mist humidifier  to increase air moisture in your home  This may make it easier for your child to breathe and help decrease his cough    How can pneumonia be prevented? · Do not let anyone smoke around your child  Smoke can make your child's coughing or breathing worse  · Get your child vaccinated  Vaccines protect against viruses or bacteria that cause infections such as the flu, pertussis, and pneumonia  · Prevent the spread of germs  Wash your hands and your child's hands often with soap to prevent the spread of germs  Do not let your child share food, drinks, or utensils with others  · Keep your child away from others who are sick  with symptoms of a respiratory infection  These include a sore throat or cough  When should I seek immediate care? · Your child is younger than 3 months and has a fever  · Your child is struggling to breathe or is wheezing  · Your child's lips or nails are bluish or gray  · Your child's skin between the ribs and around the neck pulls in with each breath  · Your child has any of the following signs of dehydration:     ¨ Crying without tears    ¨ Dizziness    ¨ Dry mouth or cracked lip    ¨ More irritable or fussy than normal    ¨ Sleepier than usual    ¨ Urinating less than usual or not at all    ¨ Sunken soft spot on the top of the head if your child is younger than 1 year  When should I contact my child's healthcare provider? · Your child has a fever of 102°F (38 9°C), or above 100 4°F (38°C) if your child is younger than 6 months  · Your child cannot stop coughing  · Your child is vomiting  · You have questions or concerns about your child's condition or care  CARE AGREEMENT:   You have the right to help plan your child's care  Learn about your child's health condition and how it may be treated  Discuss treatment options with your child's caregivers to decide what care you want for your child  The above information is an  only  It is not intended as medical advice for individual conditions or treatments   Talk to your doctor, nurse or pharmacist before following any medical regimen to see if it is safe and effective for you  © 2017 [employer] Information is for End User's use only and may not be sold, redistributed or otherwise used for commercial purposes  All illustrations and images included in CareNotes® are the copyrighted property of A [employer]  or David Quinones  upright (90 degrees)

## 2024-12-30 NOTE — PROGRESS NOTE ADULT - SUBJECTIVE AND OBJECTIVE BOX
Brett Prince MD   Interventional Cardiology / Endovascular Specialist   [place] Office : [address] [place] [address] [place] [address]  Tel:    [place] Office : [address] [facility] [place] [address]  Tel: [phone]   Cell : [phone]      Pt is lying in bed comfortable not in distress, no chest pains no SOB no palpitations  	  MEDICATIONS:  aspirin  chewable 81 milliGRAM(s) Oral daily  carvedilol 6.25 milliGRAM(s) Oral every 12 hours  heparin   Injectable 4500 Unit(s) IV Push every 6 hours PRN  heparin   Injectable 2000 Unit(s) IV Push every 6 hours PRN  heparin  Infusion.  Unit(s)/Hr IV Continuous <Continuous>        acetaminophen     Tablet .. 650 milliGRAM(s) Oral every 6 hours PRN  LORazepam   Injectable 2 milliGRAM(s) IV Push once PRN  melatonin 3 milliGRAM(s) Oral at bedtime PRN  ondansetron Injectable 4 milliGRAM(s) IV Push every 8 hours PRN  traZODone 50 milliGRAM(s) Oral at bedtime    aluminum hydroxide/magnesium hydroxide/simethicone Suspension 30 milliLiter(s) Oral every 4 hours PRN  polyethylene glycol 3350 17 Gram(s) Oral two times a day    finasteride 5 milliGRAM(s) Oral daily    chlorhexidine 2% Cloths 1 Application(s) Topical daily  latanoprost 0.005% Ophthalmic Solution 1 Drop(s) Both EYES at bedtime  multivitamin 1 Tablet(s) Oral daily  tamsulosin 0.4 milliGRAM(s) Oral at bedtime      PAST MEDICAL/SURGICAL HISTORY  PAST MEDICAL & SURGICAL HISTORY:  CAD (coronary artery disease)      Essential hypertension      HLD (hyperlipidemia)      BPH (benign prostatic hyperplasia)      HTN (hypertension)      Dementia      CAD (coronary artery disease)      S/P CABG (coronary artery bypass graft)      Stenosis of right carotid artery  s/p endartectomy      S/P drug eluting coronary stent placement          SOCIAL HISTORY: Substance Use (street drugs): ( x ) never used  (  ) other:    FAMILY HISTORY:  FH: hypertension (Mother)          PHYSICAL EXAM:  T(C): 36.9 (12-30-24 @ 16:00), Max: 36.9 (12-30-24 @ 16:00)  HR: 67 (12-30-24 @ 16:00) (67 - 78)  BP: 110/72 (12-30-24 @ 16:00) (110/65 - 150/80)  RR: 17 (12-30-24 @ 16:00) (17 - 19)  SpO2: 100% (12-30-24 @ 16:00) (98% - 100%)  Wt(kg): --  I&O's Summary        GENERAL: NAD  EYES:   PERRLA   ENMT:   Moist mucous membranes, Good dentition, No lesions  Cardiovascular: Normal S1 S2, No JVD, No murmurs, No edema  Respiratory: Lungs clear to auscultation	  Gastrointestinal:  Soft, Non-tender, + BS	  Extremities: no edema                                    8.5    4.71  )-----------( 243      ( 30 Dec 2024 04:40 )             29.2     12-30    140  |  105  |  21  ----------------------------<  79  3.5   |  26  |  1.03    Ca    8.9      30 Dec 2024 04:40  Phos  2.7     12-30  Mg     2.10     12-30      proBNP:   Lipid Profile:   HgA1c:   TSH:     Consultant(s) Notes Reviewed:  [x ] YES  [ ] NO    Care Discussed with Consultants/Other Providers [ x] YES  [ ] NO    Imaging Personally Reviewed independently:  [x] YES  [ ] NO    All labs, radiologic studies, vitals, orders and medications list reviewed. Patient is seen and examined at bedside. Case discussed with medical team.

## 2024-12-30 NOTE — PROGRESS NOTE ADULT - PROBLEM SELECTOR PLAN 3
concern for PE with hypoxia as noted on CTA of Neck - able to catch the top lobes of Lung  - awaiting CTPA for verification  - continue heparin gtt for now  - titrate down on O2 as needed

## 2024-12-30 NOTE — SWALLOW BEDSIDE ASSESSMENT ADULT - SWALLOW EVAL: DIAGNOSIS
Mild oral dysphagia for puree, soft bite sized, mildly thickened, and thin liquids characterized by slightly increased mastication time for soft/bite size, adequate anterior-posterior transport, timely bolus transfer, and piecemeal deglutition. Mild-mod oral dysphagia for regular solids characterized by bolus holding, increased mastication time, and adequate anterior-posterior transport. Mild pharyngeal dysphagia for all aforementioned consistencies characterized by delayed swallow trigger, hyolaryngeal excursion upon digital palpation, multiple swallows (2-3, secondary to piecemeal deglutition) and no overt si/sx of penetration/aspiration.

## 2024-12-30 NOTE — SWALLOW BEDSIDE ASSESSMENT ADULT - COMMENTS
CXR 12/27: "IMPRESSION: Small right pleural effusion with right infiltrate."    EEG Findings 12/30: Clinical Impression: This finding is consistent with a mild degree of diffuse or multifocal cerebral dysfunction. There were no epileptiform abnormalities recorded.      Patient seen at bedside for swallow evaluation. Patient received asleep, upright in bed. Patient woke to verbal stimulus. AOx3. Receiving supplemental O2 via NC 2.5L, SPO2 100%.

## 2024-12-30 NOTE — SWALLOW BEDSIDE ASSESSMENT ADULT - PHARYNGEAL PHASE
multiple swallow secondary to piecemeal deglutition/Delayed pharyngeal swallow/Multiple swallows multiple swallows secondary to piecemeal deglutition/Delayed pharyngeal swallow/Multiple swallows

## 2024-12-30 NOTE — SWALLOW BEDSIDE ASSESSMENT ADULT - ASR SWALLOW RECOMMEND DIAG
Cinesophagram at MD discretion if concerned of silent aspiration given recent chest imaging/VFSS/MBS

## 2024-12-30 NOTE — PROGRESS NOTE ADULT - ASSESSMENT
Case Management Discharge Planning    Admission Date: 9/6/2024  GMLOS: 2  ALOS: 1    6-Clicks ADL Score: 24  6-Clicks Mobility Score: 24      Anticipated Discharge Dispo: Discharge Disposition: D/T to home under HHA care in anticipation of covered skilled care (06)    DME Needed: No    Action(s) Taken: Choice obtained and Referral(s) sent  RN CM meet with Pt and her daughter at bedside for HH choice. Pt is agreeable with . HH choice obtained and completed. Faxed to [facility] for processing. Referral sent to [facility].Pt's PCP is Adelia Garcia.    Escalations Completed: Provider    Medically Clear: No    Next Steps: Follow up HH acceptance.    Barriers to Discharge: Medical clearance and Outpatient referrals pending    Is the patient up for discharge tomorrow: No         89M Dementia, PAD, DVT - no longer on A/C, BPH s/p chronic carter, Rt facial droop as baseline (No CVA) p/w acute hypoxic respiratory failure w/ Rt mod pleural effusion w/ LIZZETTE, complicated UTI, acute metabolic encephalopathy, c/f PE, mod/sev AS.

## 2024-12-31 DIAGNOSIS — K92.2 GASTROINTESTINAL HEMORRHAGE, UNSPECIFIED: ICD-10-CM

## 2024-12-31 DIAGNOSIS — J90 PLEURAL EFFUSION, NOT ELSEWHERE CLASSIFIED: ICD-10-CM

## 2024-12-31 DIAGNOSIS — Z20.828 CONTACT WITH AND (SUSPECTED) EXPOSURE TO OTHER VIRAL COMMUNICABLE DISEASES: ICD-10-CM

## 2024-12-31 LAB
ANION GAP SERPL CALC-SCNC: 8 MMOL/L — SIGNIFICANT CHANGE UP (ref 7–14)
APTT BLD: 172.8 SEC — CRITICAL HIGH (ref 24.5–35.6)
BLD GP AB SCN SERPL QL: NEGATIVE — SIGNIFICANT CHANGE UP
BUN SERPL-MCNC: 22 MG/DL — SIGNIFICANT CHANGE UP (ref 7–23)
CALCIUM SERPL-MCNC: 8.7 MG/DL — SIGNIFICANT CHANGE UP (ref 8.4–10.5)
CHLORIDE SERPL-SCNC: 108 MMOL/L — HIGH (ref 98–107)
CO2 SERPL-SCNC: 25 MMOL/L — SIGNIFICANT CHANGE UP (ref 22–31)
CREAT SERPL-MCNC: 0.81 MG/DL — SIGNIFICANT CHANGE UP (ref 0.5–1.3)
EGFR: 84 ML/MIN/1.73M2 — SIGNIFICANT CHANGE UP
FLUAV AG NPH QL: SIGNIFICANT CHANGE UP
FLUBV AG NPH QL: SIGNIFICANT CHANGE UP
GLUCOSE SERPL-MCNC: 87 MG/DL — SIGNIFICANT CHANGE UP (ref 70–99)
HCT VFR BLD CALC: 22.5 % — LOW (ref 39–50)
HCT VFR BLD CALC: 29.5 % — LOW (ref 39–50)
HGB BLD-MCNC: 6.7 G/DL — CRITICAL LOW (ref 13–17)
HGB BLD-MCNC: 8.7 G/DL — LOW (ref 13–17)
MAGNESIUM SERPL-MCNC: 2 MG/DL — SIGNIFICANT CHANGE UP (ref 1.6–2.6)
MCHC RBC-ENTMCNC: 25.1 PG — LOW (ref 27–34)
MCHC RBC-ENTMCNC: 25.1 PG — LOW (ref 27–34)
MCHC RBC-ENTMCNC: 29.5 G/DL — LOW (ref 32–36)
MCHC RBC-ENTMCNC: 29.8 G/DL — LOW (ref 32–36)
MCV RBC AUTO: 84.3 FL — SIGNIFICANT CHANGE UP (ref 80–100)
MCV RBC AUTO: 85 FL — SIGNIFICANT CHANGE UP (ref 80–100)
MRSA PCR RESULT.: SIGNIFICANT CHANGE UP
NRBC # BLD: 0 /100 WBCS — SIGNIFICANT CHANGE UP (ref 0–0)
NRBC # BLD: 0 /100 WBCS — SIGNIFICANT CHANGE UP (ref 0–0)
NRBC # FLD: 0 K/UL — SIGNIFICANT CHANGE UP (ref 0–0)
NRBC # FLD: 0 K/UL — SIGNIFICANT CHANGE UP (ref 0–0)
OB PNL STL: POSITIVE
PHOSPHATE SERPL-MCNC: 2.3 MG/DL — LOW (ref 2.5–4.5)
PLATELET # BLD AUTO: 245 K/UL — SIGNIFICANT CHANGE UP (ref 150–400)
PLATELET # BLD AUTO: 280 K/UL — SIGNIFICANT CHANGE UP (ref 150–400)
POTASSIUM SERPL-MCNC: 3.7 MMOL/L — SIGNIFICANT CHANGE UP (ref 3.5–5.3)
POTASSIUM SERPL-SCNC: 3.7 MMOL/L — SIGNIFICANT CHANGE UP (ref 3.5–5.3)
RBC # BLD: 2.67 M/UL — LOW (ref 4.2–5.8)
RBC # BLD: 3.47 M/UL — LOW (ref 4.2–5.8)
RBC # FLD: 16.6 % — HIGH (ref 10.3–14.5)
RBC # FLD: 16.6 % — HIGH (ref 10.3–14.5)
RH IG SCN BLD-IMP: POSITIVE — SIGNIFICANT CHANGE UP
RSV RNA NPH QL NAA+NON-PROBE: SIGNIFICANT CHANGE UP
S AUREUS DNA NOSE QL NAA+PROBE: SIGNIFICANT CHANGE UP
SARS-COV-2 RNA SPEC QL NAA+PROBE: SIGNIFICANT CHANGE UP
SODIUM SERPL-SCNC: 141 MMOL/L — SIGNIFICANT CHANGE UP (ref 135–145)
WBC # BLD: 5.24 K/UL — SIGNIFICANT CHANGE UP (ref 3.8–10.5)
WBC # BLD: 6.03 K/UL — SIGNIFICANT CHANGE UP (ref 3.8–10.5)
WBC # FLD AUTO: 5.24 K/UL — SIGNIFICANT CHANGE UP (ref 3.8–10.5)
WBC # FLD AUTO: 6.03 K/UL — SIGNIFICANT CHANGE UP (ref 3.8–10.5)

## 2024-12-31 PROCEDURE — 99233 SBSQ HOSP IP/OBS HIGH 50: CPT

## 2024-12-31 PROCEDURE — 99222 1ST HOSP IP/OBS MODERATE 55: CPT

## 2024-12-31 RX ORDER — HEPARIN SODIUM 1000 [USP'U]/ML
1000 INJECTION, SOLUTION INTRAVENOUS; SUBCUTANEOUS
Qty: 25000 | Refills: 0 | Status: DISCONTINUED | OUTPATIENT
Start: 2024-12-31 | End: 2024-12-31

## 2024-12-31 RX ORDER — PIPERACILLIN AND TAZOBACTAM 3; .375 G/15ML; G/15ML
3.38 INJECTION, POWDER, LYOPHILIZED, FOR SOLUTION INTRAVENOUS ONCE
Refills: 0 | Status: COMPLETED | OUTPATIENT
Start: 2024-12-31 | End: 2024-12-31

## 2024-12-31 RX ORDER — HEPARIN SODIUM 1000 [USP'U]/ML
4500 INJECTION, SOLUTION INTRAVENOUS; SUBCUTANEOUS EVERY 6 HOURS
Refills: 0 | Status: DISCONTINUED | OUTPATIENT
Start: 2024-12-31 | End: 2024-12-31

## 2024-12-31 RX ORDER — HEPARIN SODIUM 1000 [USP'U]/ML
2000 INJECTION, SOLUTION INTRAVENOUS; SUBCUTANEOUS EVERY 6 HOURS
Refills: 0 | Status: DISCONTINUED | OUTPATIENT
Start: 2024-12-31 | End: 2024-12-31

## 2024-12-31 RX ORDER — PIPERACILLIN AND TAZOBACTAM 3; .375 G/15ML; G/15ML
3.38 INJECTION, POWDER, LYOPHILIZED, FOR SOLUTION INTRAVENOUS EVERY 8 HOURS
Refills: 0 | Status: DISCONTINUED | OUTPATIENT
Start: 2024-12-31 | End: 2025-01-06

## 2024-12-31 RX ORDER — PIPERACILLIN AND TAZOBACTAM 3; .375 G/15ML; G/15ML
3.38 INJECTION, POWDER, LYOPHILIZED, FOR SOLUTION INTRAVENOUS ONCE
Refills: 0 | Status: DISCONTINUED | OUTPATIENT
Start: 2024-12-31 | End: 2024-12-31

## 2024-12-31 RX ORDER — OSELTAMIVIR 75 MG/1
30 CAPSULE ORAL EVERY 24 HOURS
Refills: 0 | Status: DISCONTINUED | OUTPATIENT
Start: 2024-12-31 | End: 2025-01-09

## 2024-12-31 RX ORDER — PANTOPRAZOLE 40 MG/1
40 TABLET, DELAYED RELEASE ORAL
Refills: 0 | Status: DISCONTINUED | OUTPATIENT
Start: 2024-12-31 | End: 2025-01-01

## 2024-12-31 RX ADMIN — HEPARIN SODIUM 0 UNIT(S)/HR: 1000 INJECTION, SOLUTION INTRAVENOUS; SUBCUTANEOUS at 06:58

## 2024-12-31 RX ADMIN — HEPARIN SODIUM 1000 UNIT(S)/HR: 1000 INJECTION, SOLUTION INTRAVENOUS; SUBCUTANEOUS at 09:04

## 2024-12-31 RX ADMIN — Medication 17 GRAM(S): at 05:03

## 2024-12-31 RX ADMIN — HEPARIN SODIUM 0 UNIT(S)/HR: 1000 INJECTION, SOLUTION INTRAVENOUS; SUBCUTANEOUS at 06:19

## 2024-12-31 RX ADMIN — CARVEDILOL 6.25 MILLIGRAM(S): 25 TABLET, FILM COATED ORAL at 05:04

## 2024-12-31 RX ADMIN — TRAZODONE HYDROCHLORIDE 50 MILLIGRAM(S): 150 TABLET ORAL at 22:16

## 2024-12-31 RX ADMIN — PIPERACILLIN AND TAZOBACTAM 25 GRAM(S): 3; .375 INJECTION, POWDER, LYOPHILIZED, FOR SOLUTION INTRAVENOUS at 22:15

## 2024-12-31 RX ADMIN — Medication 1 TABLET(S): at 11:12

## 2024-12-31 RX ADMIN — Medication 81 MILLIGRAM(S): at 11:12

## 2024-12-31 RX ADMIN — PIPERACILLIN AND TAZOBACTAM 200 GRAM(S): 3; .375 INJECTION, POWDER, LYOPHILIZED, FOR SOLUTION INTRAVENOUS at 14:00

## 2024-12-31 RX ADMIN — TAMSULOSIN HYDROCHLORIDE 0.4 MILLIGRAM(S): 0.4 CAPSULE ORAL at 22:16

## 2024-12-31 RX ADMIN — CHLORHEXIDINE GLUCONATE 1 APPLICATION(S): 1.2 RINSE ORAL at 11:13

## 2024-12-31 RX ADMIN — Medication 5 MILLIGRAM(S): at 11:12

## 2024-12-31 RX ADMIN — Medication 17 GRAM(S): at 17:12

## 2024-12-31 RX ADMIN — CARVEDILOL 6.25 MILLIGRAM(S): 25 TABLET, FILM COATED ORAL at 17:12

## 2024-12-31 RX ADMIN — PANTOPRAZOLE 40 MILLIGRAM(S): 40 TABLET, DELAYED RELEASE ORAL at 17:12

## 2024-12-31 NOTE — PROGRESS NOTE ADULT - ASSESSMENT
89M Dementia, PAD, DVT - no longer on A/C, BPH s/p chronic carter, Rt facial droop as baseline (No CVA) p/w subacute CVA, acute hypoxic respiratory failure w/ Rt mod pleural effusion w/ LIZZETTE, complicated UTI, acute metabolic encephalopathy, PE, large Rt pleural effusion, mod/sev AS, flu exposure 89M Dementia, PAD, DVT - no longer on A/C, BPH s/p chronic carter, Rt facial droop as baseline (No CVA) p/w subacute CVA, acute hypoxic respiratory failure w/ Rt mod pleural effusion w/ LIZZETTE, complicated UTI, acute metabolic encephalopathy, PE, large Rt pleural effusion, mod/sev AS, flu exposure, GI bleed.

## 2024-12-31 NOTE — PROGRESS NOTE ADULT - PROBLEM SELECTOR PLAN 2
concern for acute CVA  - EEG performed - no evidence of SZ D/O  - MR Brain reviewed   TTE with bubble reviewed  - Dysphagia screening done  - NIHSS   - Tele monitor  - Stroke education provided by nursing  - Continue ASA  - Lipid panel reviewed  - Statin  - PT/OT/PMR eval for safe disposition - wishes for home but if unable, agreeable for [facility]  - Hep gtt for VTE ppx - transition to Eliquis concern for acute CVA  - EEG performed - no evidence of SZ D/O  - MR Brain reviewed   TTE with bubble reviewed  - Dysphagia screening done  - NIHSS   - Tele monitor  - Stroke education provided by nursing  - ASA held d/t GI bleed concerns  - Lipid panel reviewed  - Statin  - PT/OT/PMR eval for safe disposition - wishes for home but if unable, agreeable for [facility]  - Hep gtt for VTE ppx - transition to Eliquis

## 2024-12-31 NOTE — PROGRESS NOTE ADULT - SUBJECTIVE AND OBJECTIVE BOX
[facility] Division of Hospital Medicine  David oJn MD  Contact via Microsoft Teams (Mon-Fri 8AM-4PM)  Otherwise: contact Hospitalist in Charge at [id number]    Patient is a 89y old  Male who presents with a chief complaint of weakness (30 Dec 2024 12:49)    SUBJECTIVE / OVERNIGHT EVENTS:  Patient offers no new complaints. Daughter by the bedside. Spoke to rest of the family on speakerphone by the bedside.  Daughter aware that his S+S recommended for soft/bite sized but wants regular consistency diet - aware of the risk of aspiration.   No F/C, N/V, CP, SOB, Cough, lightheadedness, dizziness, abdominal pain, diarrhea, dysuria.    MEDICATIONS  (STANDING):  aspirin  chewable 81 milliGRAM(s) Oral daily  carvedilol 6.25 milliGRAM(s) Oral every 12 hours  chlorhexidine 2% Cloths 1 Application(s) Topical daily  finasteride 5 milliGRAM(s) Oral daily  heparin  Infusion. 1000 Unit(s)/Hr (10 mL/Hr) IV Continuous <Continuous>  latanoprost 0.005% Ophthalmic Solution 1 Drop(s) Both EYES at bedtime  multivitamin 1 Tablet(s) Oral daily  polyethylene glycol 3350 17 Gram(s) Oral two times a day  tamsulosin 0.4 milliGRAM(s) Oral at bedtime  traZODone 50 milliGRAM(s) Oral at bedtime    MEDICATIONS  (PRN):  acetaminophen     Tablet .. 650 milliGRAM(s) Oral every 6 hours PRN Temp greater or equal to 38C (100.4F), Mild Pain (1 - 3)  aluminum hydroxide/magnesium hydroxide/simethicone Suspension 30 milliLiter(s) Oral every 4 hours PRN Dyspepsia  heparin   Injectable 4500 Unit(s) IV Push every 6 hours PRN For aPTT less than 40  heparin   Injectable 2000 Unit(s) IV Push every 6 hours PRN For aPTT between 40 - 57  LORazepam   Injectable 2 milliGRAM(s) IV Push once PRN Seizure  melatonin 3 milliGRAM(s) Oral at bedtime PRN Insomnia  ondansetron Injectable 4 milliGRAM(s) IV Push every 8 hours PRN Nausea and/or Vomiting      Vital Signs Last 24 Hrs  T(C): 36.7 (31 Dec 2024 11:57), Max: 37 (31 Dec 2024 08:08)  T(F): 98.1 (31 Dec 2024 11:57), Max: 98.6 (31 Dec 2024 08:08)  HR: 76 (31 Dec 2024 11:57) (67 - 78)  BP: 110/72 (31 Dec 2024 11:57) (110/72 - 142/80)  BP(mean): --  RR: 18 (31 Dec 2024 11:57) (17 - 18)  SpO2: 98% (31 Dec 2024 11:57) (98% - 100%)    Parameters below as of 31 Dec 2024 11:57  Patient On (Oxygen Delivery Method): nasal cannula  O2 Flow (L/min): 2    CAPILLARY BLOOD GLUCOSE        I&O's Summary    30 Dec 2024 07:01  -  31 Dec 2024 07:00  --------------------------------------------------------  IN: 250 mL / OUT: 1520 mL / NET: -1270 mL    31 Dec 2024 07:01  -  31 Dec 2024 13:22  --------------------------------------------------------  IN: 0 mL / OUT: 350 mL / NET: -350 mL        PHYSICAL EXAM:  CONSTITUTIONAL: NAD, frail  EYES: PERRLA; conjunctiva and sclera clear  ENMT: Moist oral mucosa, no pharyngeal injection or exudates; normal dentition  NECK: Supple, no palpable masses; no thyromegaly  RESPIRATORY: Normal respiratory effort;; decreased BS to Right side.  CARDIOVASCULAR: Regular rate and rhythm, normal S1 and S2, no murmur/rub/gallop; No lower extremity edema; Peripheral pulses are 2+ bilaterally  ABDOMEN: Nontender to palpation, normoactive bowel sounds, no rebound/guarding; No hepatosplenomegaly  MUSCULOSKELETAL:  Did not assess gait; no clubbing or cyanosis of digits; no joint swelling or tenderness to palpation  PSYCH: A+Ox1; calm  NEUROLOGY: follows simple commands, CN2-12 intact.  SKIN: No rashes; no palpable lesions    LABS:                        8.7    6.03  )-----------( 280      ( 31 Dec 2024 07:07 )             29.5     12-31    141  |  108[H]  |  22  ----------------------------<  87  3.7   |  25  |  0.81    Ca    8.7      31 Dec 2024 04:40  Phos  2.3     12-31  Mg     2.00     12-31      PTT - ( 31 Dec 2024 04:40 )  PTT:172.8 sec      Urinalysis Basic - ( 31 Dec 2024 04:40 )    Color: x / Appearance: x / SG: x / pH: x  Gluc: 87 mg/dL / Ketone: x  / Bili: x / Urobili: x   Blood: x / Protein: x / Nitrite: x   Leuk Esterase: x / RBC: x / WBC x   Sq Epi: x / Non Sq Epi: x / Bacteria: x        RADIOLOGY & ADDITIONAL TESTS:    Imaging Personally Reviewed:    Care Discussed with Consultants/Other Providers:

## 2024-12-31 NOTE — CHART NOTE - NSCHARTNOTEFT_GEN_A_CORE
Alerted by RN, Pt with 1 melanotic stool today. Occult blood confirmed positive. Pt is resting comfortably in bed without complaints, denies abd pain, n/v/d. Abd is soft, NT/ND without guarding or rebound tenderness. Discussed with Dr. Jon, will hold heparin gtt for now, monitor for further episodes of melena, antiembolism stockings ordered while heparin is held. Will continue to monitor closely, follow up repeat CBC and maintain active T+S.

## 2024-12-31 NOTE — PROGRESS NOTE ADULT - SUBJECTIVE AND OBJECTIVE BOX
Brett Prince MD   Interventional Cardiology / Endovascular Specialist   [place] Office : [phone] [place] [address] [place] [address]  Tel:    [place] Office : [address] [facility] [place] [address]  Tel: [phone]   Cell : [phone]      Pt seen and examined at bedside, not in distress  	  MEDICATIONS:  carvedilol 6.25 milliGRAM(s) Oral every 12 hours    oseltamivir 30 milliGRAM(s) Oral every 24 hours  piperacillin/tazobactam IVPB.. 3.375 Gram(s) IV Intermittent every 8 hours      acetaminophen     Tablet .. 650 milliGRAM(s) Oral every 6 hours PRN  LORazepam   Injectable 2 milliGRAM(s) IV Push once PRN  melatonin 3 milliGRAM(s) Oral at bedtime PRN  ondansetron Injectable 4 milliGRAM(s) IV Push every 8 hours PRN  traZODone 50 milliGRAM(s) Oral at bedtime    aluminum hydroxide/magnesium hydroxide/simethicone Suspension 30 milliLiter(s) Oral every 4 hours PRN  pantoprazole  Injectable 40 milliGRAM(s) IV Push two times a day  polyethylene glycol 3350 17 Gram(s) Oral two times a day    finasteride 5 milliGRAM(s) Oral daily    chlorhexidine 2% Cloths 1 Application(s) Topical daily  latanoprost 0.005% Ophthalmic Solution 1 Drop(s) Both EYES at bedtime  multivitamin 1 Tablet(s) Oral daily  tamsulosin 0.4 milliGRAM(s) Oral at bedtime      PAST MEDICAL/SURGICAL HISTORY  PAST MEDICAL & SURGICAL HISTORY:  CAD (coronary artery disease)      Essential hypertension      HLD (hyperlipidemia)      BPH (benign prostatic hyperplasia)      HTN (hypertension)      Dementia      CAD (coronary artery disease)      S/P CABG (coronary artery bypass graft)      Stenosis of right carotid artery  s/p endartectomy      S/P drug eluting coronary stent placement          SOCIAL HISTORY: Substance Use (street drugs): ( x ) never used  (  ) other:    FAMILY HISTORY:  FH: hypertension (Mother)          PHYSICAL EXAM:  T(C): 36.7 (12-31-24 @ 11:57), Max: 37 (12-31-24 @ 08:08)  HR: 76 (12-31-24 @ 11:57) (67 - 78)  BP: 110/72 (12-31-24 @ 11:57) (110/72 - 142/80)  RR: 18 (12-31-24 @ 11:57) (17 - 18)  SpO2: 98% (12-31-24 @ 11:57) (98% - 100%)  Wt(kg): --  I&O's Summary    30 Dec 2024 07:01  -  31 Dec 2024 07:00  --------------------------------------------------------  IN: 250 mL / OUT: 1520 mL / NET: -1270 mL    31 Dec 2024 07:01  -  31 Dec 2024 15:33  --------------------------------------------------------  IN: 0 mL / OUT: 350 mL / NET: -350 mL          GENERAL: NAD  EYES:   PERRLA   ENMT:   Moist mucous membranes, Good dentition, No lesions  Cardiovascular: Normal S1 S2, No JVD, No murmurs, No edema  Respiratory: Lungs clear to auscultation	  Gastrointestinal:  Soft, Non-tender, + BS	  Extremities: no edema                                    8.7    6.03  )-----------( 280      ( 31 Dec 2024 07:07 )             29.5     12-31    141  |  108[H]  |  22  ----------------------------<  87  3.7   |  25  |  0.81    Ca    8.7      31 Dec 2024 04:40  Phos  2.3     12-31  Mg     2.00     12-31      proBNP:   Lipid Profile:   HgA1c:   TSH:     Consultant(s) Notes Reviewed:  [x ] YES  [ ] NO    Care Discussed with Consultants/Other Providers [ x] YES  [ ] NO    Imaging Personally Reviewed independently:  [x] YES  [ ] NO    All labs, radiologic studies, vitals, orders and medications list reviewed. Patient is seen and examined at bedside. Case discussed with medical team.

## 2024-12-31 NOTE — PROGRESS NOTE ADULT - PROBLEM SELECTOR PLAN 3
Confirmed PE on CTPA  - continue heparin gtt - transition to Eliquis  - titrate down on O2 as needed Confirmed PE on CTPA  - hep gtt on hold d/t GI bleed concern  - titrate down on O2 as needed

## 2024-12-31 NOTE — CONSULT NOTE ADULT - SUBJECTIVE AND OBJECTIVE BOX
Patient is a 89y old  Male who presents with a chief complaint of weakness (30 Dec 2024 12:49)      HPI:  89 y.o man with a PMHx significant for pmhx of dementia, CAD s/p CABG and stents, prior DVT, and BPH with chronic indwelling Schmidt catheter, PAD, right facial droop at baseline (no hx of CVA), presents to ED for right facial droop worsening from baseline and slurred speech x 2 days. As per chart review, pt brought in by Son for episodes of body 'freezing' and staring into space. He had an episode yesterday morning and noticed worsening right sided facial droop + slowed speech. No recent fevers, sick contacts, lives at home with an aid.     ED vitals: BP 170s- 190s systolic  Hypoxic and placed on NC 94%  CTA head and neck with no large vessel occlusion but CT showing b/l partially visualized PE, started on heparin drip  Pt found to have an elevated lactate of 4.1--> 2.1, treated with CTX for UTI   (26 Dec 2024 22:24)      REVIEW OF SYSTEMS  Constitutional - No fever, No weight loss, No fatigue  HEENT - No eye pain, No visual disturbances, No difficulty hearing, No tinnitus, No vertigo, No neck pain  Respiratory - No cough, No wheezing, No shortness of breath  Cardiovascular - No chest pain, No palpitations  Gastrointestinal - No abdominal pain, No nausea, No vomiting, No diarrhea, No constipation  Genitourinary - No dysuria, No frequency, No hematuria, No incontinence  Neurological - No headaches, No memory loss, No loss of strength, No numbness, No tremors  Skin - No itching, No rashes, No lesions   Endocrine - No temperature intolerance  Musculoskeletal - No joint pain, No joint swelling, No muscle pain  Psychiatric - No depression, No anxiety    PAST MEDICAL & SURGICAL HISTORY  CAD (coronary artery disease)    Essential hypertension    HLD (hyperlipidemia)    BPH (benign prostatic hyperplasia)    HTN (hypertension)    Dementia    CAD (coronary artery disease)    S/P CABG (coronary artery bypass graft)    Stenosis of right carotid artery    S/P drug eluting coronary stent placement        SOCIAL HISTORY  Smoking - Denied  EtOH - Denied   Drugs - Denied    FUNCTIONAL HISTORY  Lives   Independent    CURRENT FUNCTIONAL STATUS  12/28  Therapeutic Interventions      Bed Mobility  Bed Mobility Training Rehab Potential: fair, will monitor progress closely  Bed Mobility Training Rolling/Turning: moderate assist (50% patient effort);  1 person assist;  nonverbal cues (demo/gestures);  verbal cues;  bed rails  Bed Mobility Training Scooting: moderate assist (50% patient effort);  1 person assist;  nonverbal cues (demo/gestures);  verbal cues;  bed rails  Bed Mobility Training Sit-to-Supine: moderate assist (50% patient effort);  1 person assist;  nonverbal cues (demo/gestures);  verbal cues;  bed rails  Bed Mobility Training Supine-to-Sit: moderate assist (50% patient effort);  1 person assist;  nonverbal cues (demo/gestures);  verbal cues;  bed rails  Bed Mobility Training Limitations: impaired ability to control trunk for mobility;  decreased ability to use legs for bridging/pushing;  decreased strength;  impaired postural control    Sit-Stand Transfer Training  Transfer Training Sit-to-Stand Transfer: moderate assist (50% patient effort);  2 person assist;  nonverbal cues (demo/gestures);  verbal cues;  rolling walker  Transfer Training Stand-to-Sit Transfer: moderate assist (50% patient effort);  2 person assist;  nonverbal cues (demo/gestures);  verbal cues;  rolling walker  Sit-to-Stand Transfer Training Transfer Safety Analysis: decreased step length;  decreased strength;  impaired balance;  rolling walker    Therapeutic Exercise  Therapeutic Exercise Detail: Pt performed seated ther ex of bilateral LE to improve strength and mobility in extremties in order to peform functional activites         12/31    Bed Mobility  Bed Mobility Training Treatment not Performed: Held due to patient having BM and needing hygiene care    Therapeutic Exercise  Therapeutic Exercise Rehab Effort: good  Therapeutic Exercise Detail: Patient performed 1x10 each of supine leg lifts, heel slides, overhead arm raises, bicep curls, and 1x3  squeezes. Pt and Pts daughter educated to perform exercises throughout the day as tolerated.           FAMILY HISTORY    FH: hypertension (Mother)        RECENT LABS/IMAGING  < from: MR Head No Cont (12.30.24 @ 13:30) >    ACC: [id number] EXAM:  MR BRAIN   ORDERED BY: LEROY BEST     PROCEDURE DATE:  12/30/2024          INTERPRETATION:  EXAM: MRI OF THE BRAIN WITHOUT CONTRAST    HISTORY: Altered mental status, right-sided facial droop    TECHNIQUE: Multi-planar multi-sequential MR imaging of the brain was   performed without intravenous contrast.    COMPARISON: CT/CTA of the head December 26, 2024.    FINDINGS:    Questionable punctate focus of diffusion restriction on DWI sequence in   the high right frontal lobe, image 5 and series 23. This is not   definitively seen on ADC sequence. No hydrocephalus. Foci of increased   T2/FLAIR signal in the deep and periventricular white matter, compatible   with chronic small vessel disease. The visualized extra axial spaces and   basal cisterns are within normal limits. No midline shift or mass effect   present.    The craniocervical junction is within normal limits. The sella is   partially CSF filled. The major intracranial vessels demonstrate the   expected signal void related to vascular flow. Scattered mucosal   thickening throughout the paranasal sinuses. Mucous retention cyst in the   left maxillary sinus. The mastoid air cells are well aerated. The   visualized orbits are status post cataract surgery.      IMPRESSION:    1.  Questionable punctate focus of diffusion restriction on DTI sequence   in the high right frontal lobe. This is not definitively seen on ADC   diffusion sequence. Finding could be artifactual or represent a punctate   subacute infarct. Recommend clinical correlation.  2.  Chronic small vessel disease.    --- End of Report ---            DUANE GARCIA MD; Attending Radiologist  This document has been electronically signed. Dec 30 2024  2:09PM    < end of copied text >    CBC Full  -  ( 31 Dec 2024 07:07 )  WBC Count : 6.03 K/uL  RBC Count : 3.47 M/uL  Hemoglobin : 8.7 g/dL  Hematocrit : 29.5 %  Platelet Count - Automated : 280 K/uL  Mean Cell Volume : 85.0 fL  Mean Cell Hemoglobin : 25.1 pg  Mean Cell Hemoglobin Concentration : 29.5 g/dL  Auto Neutrophil # : x  Auto Lymphocyte # : x  Auto Monocyte # : x  Auto Eosinophil # : x  Auto Basophil # : x  Auto Neutrophil % : x  Auto Lymphocyte % : x  Auto Monocyte % : x  Auto Eosinophil % : x  Auto Basophil % : x    12-31    141  |  108[H]  |  22  ----------------------------<  87  3.7   |  25  |  0.81    Ca    8.7      31 Dec 2024 04:40  Phos  2.3     12-31  Mg     2.00     12-31      Urinalysis Basic - ( 31 Dec 2024 04:40 )    Color: x / Appearance: x / SG: x / pH: x  Gluc: 87 mg/dL / Ketone: x  / Bili: x / Urobili: x   Blood: x / Protein: x / Nitrite: x   Leuk Esterase: x / RBC: x / WBC x   Sq Epi: x / Non Sq Epi: x / Bacteria: x        VITALS  T(C): 36.7 (12-31-24 @ 11:57), Max: 37 (12-31-24 @ 08:08)  HR: 76 (12-31-24 @ 11:57) (67 - 78)  BP: 110/72 (12-31-24 @ 11:57) (110/72 - 142/80)  RR: 18 (12-31-24 @ 11:57) (17 - 18)  SpO2: 98% (12-31-24 @ 11:57) (98% - 100%)  Wt(kg): --    ALLERGIES  ramipril (Angioedema)      MEDICATIONS   acetaminophen     Tablet .. 650 milliGRAM(s) Oral every 6 hours PRN  aluminum hydroxide/magnesium hydroxide/simethicone Suspension 30 milliLiter(s) Oral every 4 hours PRN  aspirin  chewable 81 milliGRAM(s) Oral daily  carvedilol 6.25 milliGRAM(s) Oral every 12 hours  chlorhexidine 2% Cloths 1 Application(s) Topical daily  finasteride 5 milliGRAM(s) Oral daily  heparin   Injectable 4500 Unit(s) IV Push every 6 hours PRN  heparin   Injectable 2000 Unit(s) IV Push every 6 hours PRN  heparin  Infusion. 1000 Unit(s)/Hr IV Continuous <Continuous>  latanoprost 0.005% Ophthalmic Solution 1 Drop(s) Both EYES at bedtime  LORazepam   Injectable 2 milliGRAM(s) IV Push once PRN  melatonin 3 milliGRAM(s) Oral at bedtime PRN  multivitamin 1 Tablet(s) Oral daily  ondansetron Injectable 4 milliGRAM(s) IV Push every 8 hours PRN  polyethylene glycol 3350 17 Gram(s) Oral two times a day  tamsulosin 0.4 milliGRAM(s) Oral at bedtime  traZODone 50 milliGRAM(s) Oral at bedtime      ----------------------------------------------------------------------------------------  PHYSICAL EXAM  Constitutional - NAD, Comfortable  HEENT - NCAT, EOMI  Neck - Supple, No limited ROM  Chest - CTA bilaterally, No wheeze, No rhonchi, No crackles  Cardiovascular - RRR, S1S2, No murmurs  Abdomen - BS+, Soft, NTND  Extremities - No C/C/E, No calf tenderness   Neurologic Exam -                    Cognitive - Awake, Alert, AAO to self, place, date, year, situation     Communication - Fluent, No dysarthria     Cranial Nerves - CN 2-12 intact     Motor - No focal deficits                    LEFT    UE - ShAB 5/5, EF 5/5, EE 5/5, WE 5/5,  5/5                    RIGHT UE - ShAB 5/5, EF 5/5, EE 5/5, WE 5/5,  5/5                    LEFT    LE - HF 5/5, KE 5/5, DF 5/5, PF 5/5                    RIGHT LE - HF 5/5, KE 5/5, DF 5/5, PF 5/5        Sensory - Intact to LT     Reflexes - DTR Intact, No primitive reflexive     Coordination - FTN intact     OculoVestibular - No saccades, No nystagmus, VOR         Balance - WNL Static  Psychiatric - Mood stable, Affect WNL  ----------------------------------------------------------------------------------------  ASSESSMENT/PLAN    Pain -  DVT PPX -   Rehab -     incomplete note, consult in progress Patient is a 89y old  Male who presents with a chief complaint of weakness (30 Dec 2024 12:49)      HPI:  89 y.o man with a PMHx significant for pmhx of dementia, CAD s/p CABG and stents, prior DVT, and BPH with chronic indwelling Schmidt catheter, PAD, right facial droop at baseline (no hx of CVA), presents to ED for right facial droop worsening from baseline and slurred speech x 2 days. As per chart review, pt brought in by Son for episodes of body 'freezing' and staring into space. He had an episode yesterday morning and noticed worsening right sided facial droop + slowed speech. No recent fevers, sick contacts, lives at home with an aid.     ED vitals: BP 170s- 190s systolic  Hypoxic and placed on NC 94%  CTA head and neck with no large vessel occlusion but CT showing b/l partially visualized PE, started on heparin drip  Pt found to have an elevated lactate of 4.1--> 2.1, treated with CTX for UTI   (26 Dec 2024 22:24)    Daughter at bedside, states family goal is for discharge home. States patient was not ambulating since prior admission. He has hha 8 hr per day and family assisted the rest of the time.    REVIEW OF SYSTEMS  Constitutional - No fever, No weight loss, No fatigue  HEENT - No eye pain, +L eye blindness, impaired R eye vision, No difficulty hearing, No tinnitus, No vertigo, No neck pain  Respiratory - No cough, No wheezing, No shortness of breath  Cardiovascular - No chest pain, No palpitations  Gastrointestinal - No abdominal pain, No nausea, No vomiting, No diarrhea, No constipation  Genitourinary - No dysuria, No frequency, No hematuria, No incontinence  Neurological - No headaches, + memory loss, + loss of strength, No numbness, No tremors  Skin - No itching, No rashes, No lesions   Endocrine - No temperature intolerance  Musculoskeletal - No joint pain, No joint swelling, No muscle pain  Psychiatric - No depression, No anxiety    PAST MEDICAL & SURGICAL HISTORY  CAD (coronary artery disease)    Essential hypertension    HLD (hyperlipidemia)    BPH (benign prostatic hyperplasia)    HTN (hypertension)    Dementia    CAD (coronary artery disease)    S/P CABG (coronary artery bypass graft)    Stenosis of right carotid artery    S/P drug eluting coronary stent placement        SOCIAL HISTORY  Smoking - Denied  EtOH - Denied   Drugs - Denied    FUNCTIONAL HISTORY  Lives with wife in home with stairs  ambulated with assistance prior to November hospitalization  required assistance for adls  has hha 8 hr/day    CURRENT FUNCTIONAL STATUS  12/28  Therapeutic Interventions      Bed Mobility  Bed Mobility Training Rehab Potential: fair, will monitor progress closely  Bed Mobility Training Rolling/Turning: moderate assist (50% patient effort);  1 person assist;  nonverbal cues (demo/gestures);  verbal cues;  bed rails  Bed Mobility Training Scooting: moderate assist (50% patient effort);  1 person assist;  nonverbal cues (demo/gestures);  verbal cues;  bed rails  Bed Mobility Training Sit-to-Supine: moderate assist (50% patient effort);  1 person assist;  nonverbal cues (demo/gestures);  verbal cues;  bed rails  Bed Mobility Training Supine-to-Sit: moderate assist (50% patient effort);  1 person assist;  nonverbal cues (demo/gestures);  verbal cues;  bed rails  Bed Mobility Training Limitations: impaired ability to control trunk for mobility;  decreased ability to use legs for bridging/pushing;  decreased strength;  impaired postural control    Sit-Stand Transfer Training  Transfer Training Sit-to-Stand Transfer: moderate assist (50% patient effort);  2 person assist;  nonverbal cues (demo/gestures);  verbal cues;  rolling walker  Transfer Training Stand-to-Sit Transfer: moderate assist (50% patient effort);  2 person assist;  nonverbal cues (demo/gestures);  verbal cues;  rolling walker  Sit-to-Stand Transfer Training Transfer Safety Analysis: decreased step length;  decreased strength;  impaired balance;  rolling walker    Therapeutic Exercise  Therapeutic Exercise Detail: Pt performed seated ther ex of bilateral LE to improve strength and mobility in extremties in order to peform functional activites         12/31    Bed Mobility  Bed Mobility Training Treatment not Performed: Held due to patient having BM and needing hygiene care    Therapeutic Exercise  Therapeutic Exercise Rehab Effort: good  Therapeutic Exercise Detail: Patient performed 1x10 each of supine leg lifts, heel slides, overhead arm raises, bicep curls, and 1x3  squeezes. Pt and Pts daughter educated to perform exercises throughout the day as tolerated.           FAMILY HISTORY    FH: hypertension (Mother)        RECENT LABS/IMAGING  < from: MR Head No Cont (12.30.24 @ 13:30) >    ACC: [id number] EXAM:  MR BRAIN   ORDERED BY: LEROY BEST     PROCEDURE DATE:  12/30/2024          INTERPRETATION:  EXAM: MRI OF THE BRAIN WITHOUT CONTRAST    HISTORY: Altered mental status, right-sided facial droop    TECHNIQUE: Multi-planar multi-sequential MR imaging of the brain was   performed without intravenous contrast.    COMPARISON: CT/CTA of the head December 26, 2024.    FINDINGS:    Questionable punctate focus of diffusion restriction on DWI sequence in   the high right frontal lobe, image 5 and series 23. This is not   definitively seen on ADC sequence. No hydrocephalus. Foci of increased   T2/FLAIR signal in the deep and periventricular white matter, compatible   with chronic small vessel disease. The visualized extra axial spaces and   basal cisterns are within normal limits. No midline shift or mass effect   present.    The craniocervical junction is within normal limits. The sella is   partially CSF filled. The major intracranial vessels demonstrate the   expected signal void related to vascular flow. Scattered mucosal   thickening throughout the paranasal sinuses. Mucous retention cyst in the   left maxillary sinus. The mastoid air cells are well aerated. The   visualized orbits are status post cataract surgery.      IMPRESSION:    1.  Questionable punctate focus of diffusion restriction on DTI sequence   in the high right frontal lobe. This is not definitively seen on ADC   diffusion sequence. Finding could be artifactual or represent a punctate   subacute infarct. Recommend clinical correlation.  2.  Chronic small vessel disease.    --- End of Report ---            DUANE GARCIA MD; Attending Radiologist  This document has been electronically signed. Dec 30 2024  2:09PM    < end of copied text >    CBC Full  -  ( 31 Dec 2024 07:07 )  WBC Count : 6.03 K/uL  RBC Count : 3.47 M/uL  Hemoglobin : 8.7 g/dL  Hematocrit : 29.5 %  Platelet Count - Automated : 280 K/uL  Mean Cell Volume : 85.0 fL  Mean Cell Hemoglobin : 25.1 pg  Mean Cell Hemoglobin Concentration : 29.5 g/dL  Auto Neutrophil # : x  Auto Lymphocyte # : x  Auto Monocyte # : x  Auto Eosinophil # : x  Auto Basophil # : x  Auto Neutrophil % : x  Auto Lymphocyte % : x  Auto Monocyte % : x  Auto Eosinophil % : x  Auto Basophil % : x    12-31    141  |  108[H]  |  22  ----------------------------<  87  3.7   |  25  |  0.81    Ca    8.7      31 Dec 2024 04:40  Phos  2.3     12-31  Mg     2.00     12-31      Urinalysis Basic - ( 31 Dec 2024 04:40 )    Color: x / Appearance: x / SG: x / pH: x  Gluc: 87 mg/dL / Ketone: x  / Bili: x / Urobili: x   Blood: x / Protein: x / Nitrite: x   Leuk Esterase: x / RBC: x / WBC x   Sq Epi: x / Non Sq Epi: x / Bacteria: x        VITALS  T(C): 36.7 (12-31-24 @ 11:57), Max: 37 (12-31-24 @ 08:08)  HR: 76 (12-31-24 @ 11:57) (67 - 78)  BP: 110/72 (12-31-24 @ 11:57) (110/72 - 142/80)  RR: 18 (12-31-24 @ 11:57) (17 - 18)  SpO2: 98% (12-31-24 @ 11:57) (98% - 100%)  Wt(kg): --    ALLERGIES  ramipril (Angioedema)      MEDICATIONS   acetaminophen     Tablet .. 650 milliGRAM(s) Oral every 6 hours PRN  aluminum hydroxide/magnesium hydroxide/simethicone Suspension 30 milliLiter(s) Oral every 4 hours PRN  aspirin  chewable 81 milliGRAM(s) Oral daily  carvedilol 6.25 milliGRAM(s) Oral every 12 hours  chlorhexidine 2% Cloths 1 Application(s) Topical daily  finasteride 5 milliGRAM(s) Oral daily  heparin   Injectable 4500 Unit(s) IV Push every 6 hours PRN  heparin   Injectable 2000 Unit(s) IV Push every 6 hours PRN  heparin  Infusion. 1000 Unit(s)/Hr IV Continuous <Continuous>  latanoprost 0.005% Ophthalmic Solution 1 Drop(s) Both EYES at bedtime  LORazepam   Injectable 2 milliGRAM(s) IV Push once PRN  melatonin 3 milliGRAM(s) Oral at bedtime PRN  multivitamin 1 Tablet(s) Oral daily  ondansetron Injectable 4 milliGRAM(s) IV Push every 8 hours PRN  polyethylene glycol 3350 17 Gram(s) Oral two times a day  tamsulosin 0.4 milliGRAM(s) Oral at bedtime  traZODone 50 milliGRAM(s) Oral at bedtime      ----------------------------------------------------------------------------------------  PHYSICAL EXAM  Constitutional - NAD, Comfortable  HEENT - NCAT, EOMI   Chest -no respiratory distress  Cardiovascular - RRR, S1S2  Abdomen - + suprapubic catheter. abdomen soft, NTND  Extremities - No C/C/E, No calf tenderness   Neurologic Exam -                    Cognitive - Awake, Alert, AAO to self, place but not year     Communication - Fluent, No dysarthria     Cranial Nerves - mild r facial weakness     Motor - No focal deficits                    LEFT    UE -4/5                    RIGHT UE - 4/5                    LEFT    LE - 4/5                    RIGHT LE - 4/5        Sensory - Intact to LT      Balance - WNL Static  Psychiatric - Mood stable, Affect WNL  ----------------------------------------------------------------------------------------  ASSESSMENT/PLAN  88 yo m p/w PE, UTI, facial weakness  continue bedside PT  Pain -acetaminophen  DVT PPX - on heparin  Rehab - recommended for subacute rehab when medically cleared, however per discussion with daughter at bedside, goal is for discharge home with family assistance.  recommend home with 24 hour assistance and home PT (vs homecare)

## 2024-12-31 NOTE — PROGRESS NOTE ADULT - ASSESSMENT
EKG - NSR RBBB unchanged    2D echo show EF 45%, severe pulm HTN, mod to severe AS valve area 1.1 cm2 RV mildly dilated with decreased function    a/p     1) CAD s/p CABG - cont asa and coreg EF mildly reduced , EKG unchanged , BNP > 12K but not in clinical CHF     2) B/L PE - on IV heparin , monitor h/h, severe pulm HTN and reduced RV function, hemodynamically stable no sign of right heart failure    3) Mod to Sev AS - valve are 1.1cm2 on 2d echo with low gradients, pt not a candidate for intervention sec to dementia conservative management

## 2025-01-01 DIAGNOSIS — D62 ACUTE POSTHEMORRHAGIC ANEMIA: ICD-10-CM

## 2025-01-01 LAB
ANION GAP SERPL CALC-SCNC: 10 MMOL/L — SIGNIFICANT CHANGE UP (ref 7–14)
BLD GP AB SCN SERPL QL: NEGATIVE — SIGNIFICANT CHANGE UP
BUN SERPL-MCNC: 19 MG/DL — SIGNIFICANT CHANGE UP (ref 7–23)
CALCIUM SERPL-MCNC: 8.9 MG/DL — SIGNIFICANT CHANGE UP (ref 8.4–10.5)
CHLORIDE SERPL-SCNC: 105 MMOL/L — SIGNIFICANT CHANGE UP (ref 98–107)
CO2 SERPL-SCNC: 26 MMOL/L — SIGNIFICANT CHANGE UP (ref 22–31)
CREAT SERPL-MCNC: 0.83 MG/DL — SIGNIFICANT CHANGE UP (ref 0.5–1.3)
CULTURE RESULTS: SIGNIFICANT CHANGE UP
CULTURE RESULTS: SIGNIFICANT CHANGE UP
EGFR: 84 ML/MIN/1.73M2 — SIGNIFICANT CHANGE UP
GLUCOSE SERPL-MCNC: 82 MG/DL — SIGNIFICANT CHANGE UP (ref 70–99)
HCT VFR BLD CALC: 20.7 % — CRITICAL LOW (ref 39–50)
HCT VFR BLD CALC: 23.1 % — LOW (ref 39–50)
HCT VFR BLD CALC: 30.8 % — LOW (ref 39–50)
HGB BLD-MCNC: 6.2 G/DL — CRITICAL LOW (ref 13–17)
HGB BLD-MCNC: 6.9 G/DL — CRITICAL LOW (ref 13–17)
HGB BLD-MCNC: 9.9 G/DL — LOW (ref 13–17)
MAGNESIUM SERPL-MCNC: 2 MG/DL — SIGNIFICANT CHANGE UP (ref 1.6–2.6)
MCHC RBC-ENTMCNC: 25.6 PG — LOW (ref 27–34)
MCHC RBC-ENTMCNC: 25.7 PG — LOW (ref 27–34)
MCHC RBC-ENTMCNC: 26.5 PG — LOW (ref 27–34)
MCHC RBC-ENTMCNC: 29.9 G/DL — LOW (ref 32–36)
MCHC RBC-ENTMCNC: 30 G/DL — LOW (ref 32–36)
MCHC RBC-ENTMCNC: 32.1 G/DL — SIGNIFICANT CHANGE UP (ref 32–36)
MCV RBC AUTO: 82.6 FL — SIGNIFICANT CHANGE UP (ref 80–100)
MCV RBC AUTO: 85.6 FL — SIGNIFICANT CHANGE UP (ref 80–100)
MCV RBC AUTO: 85.9 FL — SIGNIFICANT CHANGE UP (ref 80–100)
NRBC # BLD: 0 /100 WBCS — SIGNIFICANT CHANGE UP (ref 0–0)
NRBC # FLD: 0 K/UL — SIGNIFICANT CHANGE UP (ref 0–0)
PHOSPHATE SERPL-MCNC: 3.2 MG/DL — SIGNIFICANT CHANGE UP (ref 2.5–4.5)
PLATELET # BLD AUTO: 212 K/UL — SIGNIFICANT CHANGE UP (ref 150–400)
PLATELET # BLD AUTO: 225 K/UL — SIGNIFICANT CHANGE UP (ref 150–400)
PLATELET # BLD AUTO: 249 K/UL — SIGNIFICANT CHANGE UP (ref 150–400)
POTASSIUM SERPL-MCNC: 3.5 MMOL/L — SIGNIFICANT CHANGE UP (ref 3.5–5.3)
POTASSIUM SERPL-SCNC: 3.5 MMOL/L — SIGNIFICANT CHANGE UP (ref 3.5–5.3)
RBC # BLD: 2.41 M/UL — LOW (ref 4.2–5.8)
RBC # BLD: 2.7 M/UL — LOW (ref 4.2–5.8)
RBC # BLD: 3.73 M/UL — LOW (ref 4.2–5.8)
RBC # FLD: 16.4 % — HIGH (ref 10.3–14.5)
RBC # FLD: 16.6 % — HIGH (ref 10.3–14.5)
RBC # FLD: 16.9 % — HIGH (ref 10.3–14.5)
RH IG SCN BLD-IMP: POSITIVE — SIGNIFICANT CHANGE UP
SODIUM SERPL-SCNC: 141 MMOL/L — SIGNIFICANT CHANGE UP (ref 135–145)
SPECIMEN SOURCE: SIGNIFICANT CHANGE UP
SPECIMEN SOURCE: SIGNIFICANT CHANGE UP
WBC # BLD: 5.58 K/UL — SIGNIFICANT CHANGE UP (ref 3.8–10.5)
WBC # BLD: 7.42 K/UL — SIGNIFICANT CHANGE UP (ref 3.8–10.5)
WBC # BLD: 9.24 K/UL — SIGNIFICANT CHANGE UP (ref 3.8–10.5)
WBC # FLD AUTO: 5.58 K/UL — SIGNIFICANT CHANGE UP (ref 3.8–10.5)
WBC # FLD AUTO: 7.42 K/UL — SIGNIFICANT CHANGE UP (ref 3.8–10.5)
WBC # FLD AUTO: 9.24 K/UL — SIGNIFICANT CHANGE UP (ref 3.8–10.5)

## 2025-01-01 PROCEDURE — 99233 SBSQ HOSP IP/OBS HIGH 50: CPT

## 2025-01-01 RX ORDER — PANTOPRAZOLE 40 MG/1
8 TABLET, DELAYED RELEASE ORAL
Qty: 80 | Refills: 0 | Status: DISCONTINUED | OUTPATIENT
Start: 2025-01-01 | End: 2025-01-03

## 2025-01-01 RX ADMIN — CHLORHEXIDINE GLUCONATE 1 APPLICATION(S): 1.2 RINSE ORAL at 14:19

## 2025-01-01 RX ADMIN — Medication 17 GRAM(S): at 05:59

## 2025-01-01 RX ADMIN — PANTOPRAZOLE 10 MG/HR: 40 TABLET, DELAYED RELEASE ORAL at 14:16

## 2025-01-01 RX ADMIN — CARVEDILOL 6.25 MILLIGRAM(S): 25 TABLET, FILM COATED ORAL at 05:59

## 2025-01-01 RX ADMIN — LATANOPROST 1 DROP(S): 50 SOLUTION OPHTHALMIC at 23:09

## 2025-01-01 RX ADMIN — PIPERACILLIN AND TAZOBACTAM 25 GRAM(S): 3; .375 INJECTION, POWDER, LYOPHILIZED, FOR SOLUTION INTRAVENOUS at 05:58

## 2025-01-01 RX ADMIN — OSELTAMIVIR 30 MILLIGRAM(S): 75 CAPSULE ORAL at 05:58

## 2025-01-01 RX ADMIN — PANTOPRAZOLE 10 MG/HR: 40 TABLET, DELAYED RELEASE ORAL at 23:10

## 2025-01-01 RX ADMIN — PIPERACILLIN AND TAZOBACTAM 25 GRAM(S): 3; .375 INJECTION, POWDER, LYOPHILIZED, FOR SOLUTION INTRAVENOUS at 15:42

## 2025-01-01 RX ADMIN — PANTOPRAZOLE 40 MILLIGRAM(S): 40 TABLET, DELAYED RELEASE ORAL at 05:59

## 2025-01-01 RX ADMIN — PIPERACILLIN AND TAZOBACTAM 25 GRAM(S): 3; .375 INJECTION, POWDER, LYOPHILIZED, FOR SOLUTION INTRAVENOUS at 21:46

## 2025-01-01 NOTE — PROGRESS NOTE ADULT - PROBLEM SELECTOR PLAN 2
concern for acute CVA  - EEG performed - no evidence of SZ D/O  - MR Brain reviewed   TTE with bubble reviewed  - Dysphagia screening done  - NIHSS   - Tele monitor  - Stroke education provided by nursing  - ASA held d/t GI bleed concerns  - Lipid panel reviewed  - Statin  - PT/OT/PMR eval for safe disposition - wishes for home but if unable, agreeable for [facility]  - Hep gtt now off due to GIB

## 2025-01-01 NOTE — PROGRESS NOTE ADULT - PROBLEM SELECTOR PLAN 3
Confirmed PE on CTPA  - hep gtt on hold d/t GI bleed concern  - titrate down on O2 as needed, no resp distress  may need to consider IVC filter

## 2025-01-01 NOTE — CONSULT NOTE ADULT - ASSESSMENT
89 yo M with PMH of Alzheimer's dementia, CAD s/p CABG and PCI on ASA, PAD, prior DVT, PUD, severe pHTN, severe AS, and BPH with chronic indwelling Schmidt presenting with slurred speech and right facial droop for 2 days, found to have acute hypoxic respiratory failure 2/2 acute segmental and subsegmental PEs and large pleural effusion and possible subacute infarct. GI consulted for melena and acute drop in Hgb in setting of supratherapeutic PTT on heparin.     Impression:  #Melena  #Acute on chronic anemia  #Segmental and subsegmental PEs  #Large pleural effusion  #Severe AS  #Severe pHTN    P/w facial droop and slurred speech, found to have possible subacute infarct and CTPE findings of acute segmental and subsegmental PE. Pt started on heparin gtt and ASA then noted to have two melanotic stools yesterday and 2 so far today. Of note, PTT elevated to 172.8 on am labs 12/31. Labs in am on 12/31 showed Hgb 6.7 from 8.5 (baseline 9). Repeat labs showed Hgb 8.7 so no transfusion was given. CBC in evening on 12/31 showed Hgb 6.9 and repeat at 6 am on 1/1/25 showed Hgb 6.2. Labs notable for Cr 0.83 and BUN 19. Heparin gtt held since 3 pm yesterday.  Per daughter pt may have had PUD in remote past. Bleeding currently triggered by supratherapeutic AC. DDx for cause includes PUD, gastritis, duodenitis, esophagitis, dieulafoy lesion, vs AVMs. No known hx of cirrhosis or varices. Pt currently extremely high risk for sedation for endoscopic evaluation given acute segmental and subsegemental PE, large pleural effusion, severe AS, and severe pHTN. Vitals notable for hypotension this morning to 80/52 with repeat bp improved to 128/65 after 1 unit pRBC transfusion.    - Manage conservatively and hold off on endoscopic evaluation at this time given high cardiac risk for procedure  - Continue PPI gtt   - Transfuse 2 units pRBC for goal Hgb>8 given cardiac dz  - Trend cbc q8h, active T&S, transfuse Hgb>8  - Monitor vitals and hemodynamics closely  - Hold AC for now. If Hgb stabilizes on 2 consecutive cbcs tomorrow, can consider resuming AC given b/l PEs  - Risk/benefit per primary team regarding ASA  - Keep NPO at MN in case pt does not respond appropriately to transfusion  - Maintain two large bore peripheral IVs    All recommendations are tentative until note is attested by attending.     Rivka Martinez, PGY4  Gastroenterology/Hepatology Fellow  Available on Microsoft Teams  [phone] (Long Range Pager)  [phone] (Short Range Pager [facility])    After 5 pm, please contact the on-call GI fellow for any urgent issues via the Hospital Call    89 yo M with PMH of Alzheimer's dementia, CAD s/p CABG and PCI on ASA, PAD, prior DVT, PUD, severe pHTN, severe AS, and BPH with chronic indwelling Schmidt presenting with slurred speech and right facial droop for 2 days, found to have acute hypoxic respiratory failure 2/2 acute segmental and subsegmental PEs and large pleural effusion and possible subacute infarct. GI consulted for melena and acute drop in Hgb in setting of supratherapeutic PTT on heparin.     Impression:  #Melena  #Acute on chronic anemia  #Segmental and subsegmental PEs  #Large pleural effusion  #Severe AS  #Severe pHTN    P/w facial droop and slurred speech, found to have possible subacute infarct and CTPE findings of acute segmental and subsegmental PE. Pt started on heparin gtt and ASA then noted to have two melanotic stools yesterday and 2 so far today. Of note, PTT elevated to 172.8 on am labs 12/31. Labs in am on 12/31 showed Hgb 6.7 from 8.5 (baseline 9). Repeat labs showed Hgb 8.7 so no transfusion was given. CBC in evening on 12/31 showed Hgb 6.9 and repeat at 6 am on 1/1/25 showed Hgb 6.2. Labs notable for Cr 0.83 and BUN 19. Heparin gtt held since 3 pm yesterday.  Per daughter pt may have had PUD in remote past. Bleeding currently triggered by supratherapeutic AC. DDx for cause includes PUD, gastritis, duodenitis, esophagitis, dieulafoy lesion, vs AVMs. No known hx of cirrhosis or varices. Pt currently extremely high risk for sedation for endoscopic evaluation given acute segmental and subsegemental PE, large pleural effusion, severe AS, and severe pHTN. Vitals notable for hypotension this morning to 80/52 with repeat bp improved to 128/65 after 1 unit pRBC transfusion.    - Manage conservatively and hold off on endoscopic evaluation at this time given high cardiac risk for procedure  - Continue PPI gtt   - Transfuse 2 units pRBC for goal Hgb>8 given cardiac dz  - Trend cbc q8h, active T&S, transfuse Hgb>8  - Monitor vitals and hemodynamics closely  - If Hgb stabilizes on 2 consecutive cbcs tomorrow, can resume AC given b/l PEs  - No absolute GI contraindication to restarting ASA if needed  - Maintain two large bore peripheral IVs    All recommendations are tentative until note is attested by attending.     Rivka Martinez, PGY4  Gastroenterology/Hepatology Fellow  Available on Microsoft Teams  [phone] (Long Range Pager)  [phone] (Short Range Pager [facility])    After 5 pm, please contact the on-call GI fellow for any urgent issues via the Hospital Call

## 2025-01-01 NOTE — PROGRESS NOTE ADULT - ASSESSMENT
89M Dementia, PAD, DVT - no longer on A/C, BPH s/p chronic carter, Rt facial droop as baseline (No CVA) p/w subacute CVA, acute hypoxic respiratory failure w/ Rt mod pleural effusion w/ LIZZETTE, complicated UTI, acute metabolic encephalopathy, PE, large Rt pleural effusion, mod/sev AS, flu exposure, GI bleed.

## 2025-01-01 NOTE — CONSULT NOTE ADULT - ATTENDING COMMENTS
Exam:  MS: Eyes closed.   Opens eyes and says "yea" to his name.  No follow commands.  CN:  No BTT.  Eyes in primary gaze.  Horizontal EOM are full to voice - he looks towards voice.  Corneal reflex is intact and symmetric.  Pupils irregular - NR.     Motor/sensory:  w/d to stimulation all 4 ext.  Holds arms up symmetrically when raised by examiner.    Myoclonus with passive movement in the arms - resolves with rest.       A/P  Mr. Caldwell is an 89 yo man with toxic metabolic septic encephalopathy superimposed upon pre-existing dementia.   We recommend further work up as above only if he does not return to baseline.   Thank you
Assessment/recommendations as per GI fellow.  Episode of melena associated with supratherapeutic PTT.  Probable UGI source–stress gastritis, PUD, esophagitis as possible etiology.  Recommendations as noted.  At high risk for sedation and upper endoscopy.  Therefore, plan for conservative treatment regimen with PPI drip in conjunction with transfusion with PRBCs to maintain hemoglobin greater than 8 g.  Pending clinical course if stable on 1/3 can resume AC with goal of low therapeutic  PTT.  Recommendations:  - Manage conservatively and hold off on endoscopic evaluation at this time given high cardiac risk for procedure  - Continue PPI gtt   - Transfuse 2 units pRBC for goal Hgb>8 given cardiac dz  - Trend cbc q8h, active T&S, transfuse Hgb>8  - Monitor vitals and hemodynamics closely  - If Hgb stabilizes on 2 consecutive cbcs tomorrow, can resume AC given b/l PEs  - No absolute GI contraindication to restarting ASA if needed  - Maintain two large bore peripheral IVs

## 2025-01-01 NOTE — PROGRESS NOTE ADULT - SUBJECTIVE AND OBJECTIVE BOX
[facility] Division of Hospital Medicine  Mackenzie Rae MD  Pager [phone]    Patient is a 89y old  Male who presents with a chief complaint of weakness       SUBJECTIVE / OVERNIGHT EVENTS: overnight events noted; pt's son at bedside; pt reports he is so-so; son states that's his usual responds; spoke with RN this AM about overnight events; RN was told to hold off PRBC early this AM until AM labs returned as concern for lab error, repeat HG 6.2 which was lower than overnight value, PRBC started and pt subseq with hypotension but asymptomatic; second IV line placed, PRBC to run faster; protonix infusion started, made NPO; GI consulted      MEDICATIONS  (STANDING):  carvedilol 6.25 milliGRAM(s) Oral every 12 hours  chlorhexidine 2% Cloths 1 Application(s) Topical daily  finasteride 5 milliGRAM(s) Oral daily  latanoprost 0.005% Ophthalmic Solution 1 Drop(s) Both EYES at bedtime  multivitamin 1 Tablet(s) Oral daily  oseltamivir 30 milliGRAM(s) Oral every 24 hours  pantoprazole Infusion 8 mG/Hr (10 mL/Hr) IV Continuous <Continuous>  piperacillin/tazobactam IVPB.. 3.375 Gram(s) IV Intermittent every 8 hours  polyethylene glycol 3350 17 Gram(s) Oral two times a day  tamsulosin 0.4 milliGRAM(s) Oral at bedtime  traZODone 50 milliGRAM(s) Oral at bedtime    MEDICATIONS  (PRN):  acetaminophen     Tablet .. 650 milliGRAM(s) Oral every 6 hours PRN Temp greater or equal to 38C (100.4F), Mild Pain (1 - 3)  aluminum hydroxide/magnesium hydroxide/simethicone Suspension 30 milliLiter(s) Oral every 4 hours PRN Dyspepsia  LORazepam   Injectable 2 milliGRAM(s) IV Push once PRN Seizure  melatonin 3 milliGRAM(s) Oral at bedtime PRN Insomnia  ondansetron Injectable 4 milliGRAM(s) IV Push every 8 hours PRN Nausea and/or Vomiting      PHYSICAL EXAM:  Vital Signs Last 24 Hrs  T(F): 98.4 (01 Jan 2025 10:00), Max: 98.5 (01 Jan 2025 00:00)  HR: 76 (01 Jan 2025 10:00) (68 - 84)  BP: 80/52 (01 Jan 2025 10:00) (80/52 - 136/88)  RR: 18 (01 Jan 2025 10:00) (18 - 18)  SpO2: 95% (01 Jan 2025 10:00) (95% - 99%)    Parameters below as of 01 Jan 2025 10:00  Patient On (Oxygen Delivery Method): nasal cannula  O2 Flow (L/min): 1      CONSTITUTIONAL: NAD, appears comfortable, thin  EYES: PERRLA; conjunctiva and sclera clear  ENMT: Moist oral mucosa, no pharyngeal injection or exudates; normal dentition  RESPIRATORY: Normal respiratory effort; grossly b/l AE  CARDIOVASCULAR: Regular rate and rhythm; No lower extremity edema  ABDOMEN: Nontender to palpation, normoactive bowel sounds; suprapubic cath draining clear yellow urine  MUSCULOSKELETAL:  no clubbing or cyanosis of digits; no joint swelling or tenderness to palpation  PSYCH: calm, coop; affect appropriate  NEUROLOGY: CN 2-12 are intact and symmetric; no gross sensory deficits   SKIN: No rashes; no palpable lesions    LABS:                        6.2    5.58  )-----------( 212      ( 01 Jan 2025 06:25 )             20.7     01-01    141  |  105  |  19  ----------------------------<  82  3.5   |  26  |  0.83    Ca    8.9      01 Jan 2025 06:25  Phos  3.2     01-01  Mg     2.00     01-01

## 2025-01-01 NOTE — CHART NOTE - NSCHARTNOTEFT_GEN_A_CORE
Patient laying in bed in NAD . Awake alert, family at bedside   Additional episode of melena x 2 this am     Complete Blood Count (01.01.25 @ 06:25)    Nucleated RBC: 0 /100 WBCs    WBC Count: 5.58 K/uL    RBC Count: 2.41 M/uL    Hemoglobin: 6.2: Test Repeated  TYPE:(C=Critical, N=Notification, A=Abnormal) C  TESTS: _H/H  DATE/TIME CALLED: _01/01/2025 09:07:32 EST  CALLED TO: _RADHA KLINE  READ BACK (2 Patient Identifiers)(Y/N): _Y  READ BACK VALUES (Y/N): _Y  CALLED BY: _RM g/dL    Hematocrit: 20.7 %    Mean Cell Volume: 85.9 fL    Mean Cell Hemoglobin: 25.7 pg    Mean Cell Hemoglobin Conc: 30.0 g/dL    Red Cell Distrib Width: 16.6 %    Platelet Count - Automated: 212 K/uL    Nucleated RBC #: 0.00 K/u    89 year old male with hx of dementia, CAD with CABG stents, DVT , CVA, BPH chronic suprapubic catheter  found to have pulmonary embolus developed melena on 12/31  - AC on hold   - Protonix IV BID   - transfusing 2 units of PRBC - rate increased. consent obtained and in chart   - GI consult emailed   - frequent vitals  - 2nd IV access obtained

## 2025-01-01 NOTE — CONSULT NOTE ADULT - SUBJECTIVE AND OBJECTIVE BOX
Chief Complaint: melena      HPI:  Daron Caldwell is a 88 yo M with PMH of Alzheimer's dementia, CAD s/p CABG and PCI on ASA, PAD, prior DVT, PUD, severe pHTN, severe AS, and BPH with chronic indwelling Schmidt presenting with slurred speech and right facial droop for 2 days. On presentation, vitals notable for T 98, bp 174/100, HR 83, RR 22, and SpO2 94% on NC. CTA head and neck negative for large vessel occlusion but incidentally noted PE. CTA chest showed segmental PE in RUL and subsegmental PEs in MICHELLE and both lower lobes with no right heart strain and a large right pleural effusion with associated partial passive atelectasis in right lower lobe. Patient started on heparin gtt for treatment of PE. MRI brain showed possible subacute infarct in right frontal lobe. Pt being managed and worked up for an acute CVA.    GI consulted for melena. Pt noted to have two melanotic stools yesterday and 2 so far today. Of note, PTT elevated to 172.8 on am labs 12/31. Labs in am on 12/31 showed Hgb 6.7 from 8.5 (baseline 9). Repeat labs showed Hgb 8.7 so no transfusion was given. CBC in evening on 12/31 showed Hgb 6.9 and repeat at 6 am on 1/1/25 showed Hgb 6.2. Labs notable for Cr 0.83 and BUN 19. Heparin gtt held since 3 pm yesterday. Vitals notable for hypotension this morning to 80/52 with repeat bp improved to 128/65 after 1 unit pRBC transfusion.     Pt unable to provide history and AAOx1 and lethargic. Per daughter, he had melena for several days 2 mo ago which resolved and had a negative stool sample at that time. She reports that he takes ASA 81 mg daily and takes ibuprofen 2x/week for pain. She believes he had a peptic ulcer >20 years but does not know any further details. She reports that he had a normal colonoscopy a few years ago. She denies any hx of GI bleeds in the past. Per [facility] form, patient is DNR but intubate.     Allergies:  ramipril (Angioedema)      Home Medications:    Hospital Medications:  acetaminophen     Tablet .. 650 milliGRAM(s) Oral every 6 hours PRN  aluminum hydroxide/magnesium hydroxide/simethicone Suspension 30 milliLiter(s) Oral every 4 hours PRN  carvedilol 6.25 milliGRAM(s) Oral every 12 hours  chlorhexidine 2% Cloths 1 Application(s) Topical daily  finasteride 5 milliGRAM(s) Oral daily  latanoprost 0.005% Ophthalmic Solution 1 Drop(s) Both EYES at bedtime  LORazepam   Injectable 2 milliGRAM(s) IV Push once PRN  melatonin 3 milliGRAM(s) Oral at bedtime PRN  multivitamin 1 Tablet(s) Oral daily  ondansetron Injectable 4 milliGRAM(s) IV Push every 8 hours PRN  oseltamivir 30 milliGRAM(s) Oral every 24 hours  pantoprazole Infusion 8 mG/Hr IV Continuous <Continuous>  piperacillin/tazobactam IVPB.. 3.375 Gram(s) IV Intermittent every 8 hours  polyethylene glycol 3350 17 Gram(s) Oral two times a day  tamsulosin 0.4 milliGRAM(s) Oral at bedtime  traZODone 50 milliGRAM(s) Oral at bedtime      PMHX/PSHX:  CAD (coronary artery disease)    Essential hypertension    HLD (hyperlipidemia)    BPH (benign prostatic hyperplasia)    HTN (hypertension)    Dementia    CAD (coronary artery disease)    S/P CABG (coronary artery bypass graft)    Stenosis of right carotid artery    S/P drug eluting coronary stent placement        Family history:  No pertinent family history in first degree relatives    No pertinent family history in first degree relatives    FH: hypertension (Mother)    Social History:   Limited due to mental status    ROS:   Limited due to mental status    PHYSICAL EXAM:   GENERAL: Lethargic, minimally responsive to questions  HEENT:  Normocephalic/atraumatic, no scleral icterus  CHEST:  No accessory muscle use  HEART:  Regular rate and rhythm  ABDOMEN:  Soft, non-tender, non-distended, normoactive bowel sounds,  no masses, no hepato-splenomegaly, no signs of chronic liver disease  GEETA: pt sitting in pool of melena  EXTREMITIES: No cyanosis, clubbing, or edema  SKIN:  No rash  NEURO:  Alert and oriented x 1, no asterixis    Vital Signs:  Vital Signs Last 24 Hrs  T(C): 37.1 (01 Jan 2025 13:37), Max: 37.1 (01 Jan 2025 13:37)  T(F): 98.7 (01 Jan 2025 13:37), Max: 98.7 (01 Jan 2025 13:37)  HR: 94 (01 Jan 2025 13:37) (68 - 94)  BP: 138/97 (01 Jan 2025 13:37) (80/52 - 138/97)  BP(mean): --  RR: 18 (01 Jan 2025 13:37) (18 - 18)  SpO2: 98% (01 Jan 2025 13:37) (95% - 99%)    Parameters below as of 01 Jan 2025 13:37  Patient On (Oxygen Delivery Method): room air      Daily     Daily     LABS:                        6.2    5.58  )-----------( 212      ( 01 Jan 2025 06:25 )             20.7     Mean Cell Volume: 85.9 fL (01-01-25 @ 06:25)    01-01    141  |  105  |  19  ----------------------------<  82  3.5   |  26  |  0.83    Ca    8.9      01 Jan 2025 06:25  Phos  3.2     01-01  Mg     2.00     01-01        PTT - ( 31 Dec 2024 04:40 )  PTT:172.8 sec  Urinalysis Basic - ( 01 Jan 2025 06:25 )    Color: x / Appearance: x / SG: x / pH: x  Gluc: 82 mg/dL / Ketone: x  / Bili: x / Urobili: x   Blood: x / Protein: x / Nitrite: x   Leuk Esterase: x / RBC: x / WBC x   Sq Epi: x / Non Sq Epi: x / Bacteria: x                              6.2    5.58  )-----------( 212      ( 01 Jan 2025 06:25 )             20.7                         6.9    7.42  )-----------( 225      ( 31 Dec 2024 23:30 )             23.1                         8.7    6.03  )-----------( 280      ( 31 Dec 2024 07:07 )             29.5                         6.7    5.24  )-----------( 245      ( 31 Dec 2024 04:40 )             22.5                         8.5    4.71  )-----------( 243      ( 30 Dec 2024 04:40 )             29.2       Imaging:  CTPE  Redemonstrated segmental pulmonary embolism in right upper lobe and   subsegmental pulmonary thromboemboli in left upper lobe and both lower   lobes. No evidence of acute right heart strain.  Large right pleural effusion with associated partial passive atelectasis   right lower lobe.    TTE    1. Left ventricular systolic function is moderately decreased with an ejection fraction of 45 % by Arvizu's method of disks.   2. There is mild (grade 1) left ventricular diastolic dysfunction, with elevated left ventricular filling pressure.   3. Mildly enlarged right ventricular cavity size, with normal wall thickness, and reduced right ventricular systolic function. Tricuspid annular plane systolic excursion (TAPSE) is 1.7 cm (normal >=1.7 cm).   4. Left atrium is normal in size.   5. The right atrium is dilated.   6. No pericardial effusion seen.   7.Estimated pulmonary artery systolic pressure is 89 mmHg, consistent with severe pulmonary hypertension.   8. Left ventricular global longitudinal strain is -10.6 % is abnormal (> -16%). Images were acquired on a [facility] ultrasound system and processed on the ultrasound machine with a heart rate of 72 bpm and a blood pressure of 185/94 mmHg.   9. The inferior vena cava is normal in size measuring 1.40 cm in diameter, (normal <2.1cm) with normal inspiratory collapse (normal >50%) consistent with normal right atrial pressure (~3, range 0-5mmHg).  10. There is calcification of the aortic valve leaflets. Severe aortic stenosis.

## 2025-01-02 LAB
ANION GAP SERPL CALC-SCNC: 9 MMOL/L — SIGNIFICANT CHANGE UP (ref 7–14)
BUN SERPL-MCNC: 16 MG/DL — SIGNIFICANT CHANGE UP (ref 7–23)
CALCIUM SERPL-MCNC: 8.9 MG/DL — SIGNIFICANT CHANGE UP (ref 8.4–10.5)
CHLORIDE SERPL-SCNC: 104 MMOL/L — SIGNIFICANT CHANGE UP (ref 98–107)
CO2 SERPL-SCNC: 25 MMOL/L — SIGNIFICANT CHANGE UP (ref 22–31)
CREAT SERPL-MCNC: 0.94 MG/DL — SIGNIFICANT CHANGE UP (ref 0.5–1.3)
EGFR: 77 ML/MIN/1.73M2 — SIGNIFICANT CHANGE UP
GLUCOSE SERPL-MCNC: 79 MG/DL — SIGNIFICANT CHANGE UP (ref 70–99)
HCT VFR BLD CALC: 30.5 % — LOW (ref 39–50)
HCT VFR BLD CALC: 32.5 % — LOW (ref 39–50)
HGB BLD-MCNC: 10.3 G/DL — LOW (ref 13–17)
HGB BLD-MCNC: 9.4 G/DL — LOW (ref 13–17)
MAGNESIUM SERPL-MCNC: 1.9 MG/DL — SIGNIFICANT CHANGE UP (ref 1.6–2.6)
MCHC RBC-ENTMCNC: 25.7 PG — LOW (ref 27–34)
MCHC RBC-ENTMCNC: 26.3 PG — LOW (ref 27–34)
MCHC RBC-ENTMCNC: 30.8 G/DL — LOW (ref 32–36)
MCHC RBC-ENTMCNC: 31.7 G/DL — LOW (ref 32–36)
MCV RBC AUTO: 83.1 FL — SIGNIFICANT CHANGE UP (ref 80–100)
MCV RBC AUTO: 83.3 FL — SIGNIFICANT CHANGE UP (ref 80–100)
NRBC # BLD: 0 /100 WBCS — SIGNIFICANT CHANGE UP (ref 0–0)
NRBC # BLD: 0 /100 WBCS — SIGNIFICANT CHANGE UP (ref 0–0)
NRBC # FLD: 0 K/UL — SIGNIFICANT CHANGE UP (ref 0–0)
NRBC # FLD: 0 K/UL — SIGNIFICANT CHANGE UP (ref 0–0)
PHOSPHATE SERPL-MCNC: 3 MG/DL — SIGNIFICANT CHANGE UP (ref 2.5–4.5)
PLATELET # BLD AUTO: 233 K/UL — SIGNIFICANT CHANGE UP (ref 150–400)
PLATELET # BLD AUTO: 249 K/UL — SIGNIFICANT CHANGE UP (ref 150–400)
POTASSIUM SERPL-MCNC: 3.6 MMOL/L — SIGNIFICANT CHANGE UP (ref 3.5–5.3)
POTASSIUM SERPL-SCNC: 3.6 MMOL/L — SIGNIFICANT CHANGE UP (ref 3.5–5.3)
RBC # BLD: 3.66 M/UL — LOW (ref 4.2–5.8)
RBC # BLD: 3.91 M/UL — LOW (ref 4.2–5.8)
RBC # FLD: 17.1 % — HIGH (ref 10.3–14.5)
RBC # FLD: 17.1 % — HIGH (ref 10.3–14.5)
SODIUM SERPL-SCNC: 138 MMOL/L — SIGNIFICANT CHANGE UP (ref 135–145)
WBC # BLD: 10.87 K/UL — HIGH (ref 3.8–10.5)
WBC # BLD: 7.48 K/UL — SIGNIFICANT CHANGE UP (ref 3.8–10.5)
WBC # FLD AUTO: 10.87 K/UL — HIGH (ref 3.8–10.5)
WBC # FLD AUTO: 7.48 K/UL — SIGNIFICANT CHANGE UP (ref 3.8–10.5)

## 2025-01-02 PROCEDURE — 99222 1ST HOSP IP/OBS MODERATE 55: CPT | Mod: GC

## 2025-01-02 PROCEDURE — 99233 SBSQ HOSP IP/OBS HIGH 50: CPT

## 2025-01-02 RX ADMIN — PIPERACILLIN AND TAZOBACTAM 25 GRAM(S): 3; .375 INJECTION, POWDER, LYOPHILIZED, FOR SOLUTION INTRAVENOUS at 14:05

## 2025-01-02 RX ADMIN — PIPERACILLIN AND TAZOBACTAM 25 GRAM(S): 3; .375 INJECTION, POWDER, LYOPHILIZED, FOR SOLUTION INTRAVENOUS at 21:38

## 2025-01-02 RX ADMIN — Medication 17 GRAM(S): at 17:58

## 2025-01-02 RX ADMIN — TAMSULOSIN HYDROCHLORIDE 0.4 MILLIGRAM(S): 0.4 CAPSULE ORAL at 21:39

## 2025-01-02 RX ADMIN — LATANOPROST 1 DROP(S): 50 SOLUTION OPHTHALMIC at 21:39

## 2025-01-02 RX ADMIN — PIPERACILLIN AND TAZOBACTAM 25 GRAM(S): 3; .375 INJECTION, POWDER, LYOPHILIZED, FOR SOLUTION INTRAVENOUS at 05:15

## 2025-01-02 RX ADMIN — PANTOPRAZOLE 10 MG/HR: 40 TABLET, DELAYED RELEASE ORAL at 08:47

## 2025-01-02 RX ADMIN — CARVEDILOL 6.25 MILLIGRAM(S): 25 TABLET, FILM COATED ORAL at 17:58

## 2025-01-02 RX ADMIN — TRAZODONE HYDROCHLORIDE 50 MILLIGRAM(S): 150 TABLET ORAL at 21:39

## 2025-01-02 RX ADMIN — CHLORHEXIDINE GLUCONATE 1 APPLICATION(S): 1.2 RINSE ORAL at 14:08

## 2025-01-02 NOTE — PROGRESS NOTE ADULT - SUBJECTIVE AND OBJECTIVE BOX
[facility] Division of Hospital Medicine  David Jon MD  Contact via Microsoft Teams (Mon-Fri 8AM-4PM)  Otherwise: contact Hospitalist in Charge at [phone]    Patient is a 89y old  Male who presents with a chief complaint of weakness (02 Jan 2025 11:40)    SUBJECTIVE / OVERNIGHT EVENTS:  Patient offers no new complaints. Limited history d/t acute encephalopathy.  No overnight issues with F/C, vomiting, SOB, Cough, diarrhea.    MEDICATIONS  (STANDING):  carvedilol 6.25 milliGRAM(s) Oral every 12 hours  chlorhexidine 2% Cloths 1 Application(s) Topical daily  finasteride 5 milliGRAM(s) Oral daily  latanoprost 0.005% Ophthalmic Solution 1 Drop(s) Both EYES at bedtime  multivitamin 1 Tablet(s) Oral daily  oseltamivir 30 milliGRAM(s) Oral every 24 hours  pantoprazole Infusion 8 mG/Hr (10 mL/Hr) IV Continuous <Continuous>  piperacillin/tazobactam IVPB.. 3.375 Gram(s) IV Intermittent every 8 hours  polyethylene glycol 3350 17 Gram(s) Oral two times a day  tamsulosin 0.4 milliGRAM(s) Oral at bedtime  traZODone 50 milliGRAM(s) Oral at bedtime    MEDICATIONS  (PRN):  acetaminophen     Tablet .. 650 milliGRAM(s) Oral every 6 hours PRN Temp greater or equal to 38C (100.4F), Mild Pain (1 - 3)  aluminum hydroxide/magnesium hydroxide/simethicone Suspension 30 milliLiter(s) Oral every 4 hours PRN Dyspepsia  LORazepam   Injectable 2 milliGRAM(s) IV Push once PRN Seizure  melatonin 3 milliGRAM(s) Oral at bedtime PRN Insomnia  ondansetron Injectable 4 milliGRAM(s) IV Push every 8 hours PRN Nausea and/or Vomiting      Vital Signs Last 24 Hrs  T(C): 36.7 (02 Jan 2025 14:23), Max: 37.1 (01 Jan 2025 15:38)  T(F): 98.1 (02 Jan 2025 14:23), Max: 98.8 (01 Jan 2025 15:38)  HR: 90 (02 Jan 2025 14:23) (90 - 105)  BP: 130/80 (02 Jan 2025 14:23) (122/94 - 139/86)  BP(mean): --  RR: 18 (02 Jan 2025 14:23) (17 - 18)  SpO2: 99% (02 Jan 2025 14:23) (94% - 99%)    Parameters below as of 02 Jan 2025 14:23  Patient On (Oxygen Delivery Method): room air      CAPILLARY BLOOD GLUCOSE      POCT Blood Glucose.: 90 mg/dL (01 Jan 2025 23:06)    I&O's Summary    01 Jan 2025 07:01  -  02 Jan 2025 07:00  --------------------------------------------------------  IN: 150 mL / OUT: 925 mL / NET: -775 mL        PHYSICAL EXAM:  CONSTITUTIONAL: NAD, appears comfortable, thin  EYES: PERRLA; conjunctiva and sclera clear  ENMT: Moist oral mucosa, no pharyngeal injection or exudates; normal dentition  RESPIRATORY: Normal respiratory effort; grossly b/l AE  CARDIOVASCULAR: Regular rate and rhythm; No lower extremity edema  ABDOMEN: Nontender to palpation, normoactive bowel sounds; suprapubic cath draining clear yellow urine  MUSCULOSKELETAL:  no clubbing or cyanosis of digits; no joint swelling or tenderness to palpation  PSYCH: calm, coop; affect appropriate  NEUROLOGY: CN 2-12 are intact and symmetric; no gross sensory deficits   SKIN: No rashes; no palpable lesions    LABS:                        9.4    7.48  )-----------( 233      ( 02 Jan 2025 06:09 )             30.5     01-02    138  |  104  |  16  ----------------------------<  79  3.6   |  25  |  0.94    Ca    8.9      02 Jan 2025 06:09  Phos  3.0     01-02  Mg     1.90     01-02            Urinalysis Basic - ( 02 Jan 2025 06:09 )    Color: x / Appearance: x / SG: x / pH: x  Gluc: 79 mg/dL / Ketone: x  / Bili: x / Urobili: x   Blood: x / Protein: x / Nitrite: x   Leuk Esterase: x / RBC: x / WBC x   Sq Epi: x / Non Sq Epi: x / Bacteria: x        RADIOLOGY & ADDITIONAL TESTS:    Imaging Personally Reviewed:    Care Discussed with Consultants/Other Providers:

## 2025-01-02 NOTE — PROGRESS NOTE ADULT - PROBLEM SELECTOR PLAN 6
Complicated UTI d/t chronic carter  - continue Zosyn for 5-7 days  - F/U UCx/S  ucx with more than 3 organisms, suspect pt is colonized, can consider dc abx; pt non toxic

## 2025-01-02 NOTE — PROGRESS NOTE ADULT - SUBJECTIVE AND OBJECTIVE BOX
Brett Prince MD   Interventional Cardiology / Endovascular Specialist   [place] Office : [address] [place] [address] [place] [address]  Tel:    [place] Office : [address] [facility] [place] [address]  Tel: [phone]   Cell : [phone]      Pt seen and examined at bedside, not in distress  	  MEDICATIONS:  carvedilol 6.25 milliGRAM(s) Oral every 12 hours    oseltamivir 30 milliGRAM(s) Oral every 24 hours  piperacillin/tazobactam IVPB.. 3.375 Gram(s) IV Intermittent every 8 hours      acetaminophen     Tablet .. 650 milliGRAM(s) Oral every 6 hours PRN  LORazepam   Injectable 2 milliGRAM(s) IV Push once PRN  melatonin 3 milliGRAM(s) Oral at bedtime PRN  ondansetron Injectable 4 milliGRAM(s) IV Push every 8 hours PRN  traZODone 50 milliGRAM(s) Oral at bedtime    aluminum hydroxide/magnesium hydroxide/simethicone Suspension 30 milliLiter(s) Oral every 4 hours PRN  pantoprazole Infusion 8 mG/Hr IV Continuous <Continuous>  polyethylene glycol 3350 17 Gram(s) Oral two times a day    finasteride 5 milliGRAM(s) Oral daily    chlorhexidine 2% Cloths 1 Application(s) Topical daily  latanoprost 0.005% Ophthalmic Solution 1 Drop(s) Both EYES at bedtime  multivitamin 1 Tablet(s) Oral daily  tamsulosin 0.4 milliGRAM(s) Oral at bedtime      PAST MEDICAL/SURGICAL HISTORY  PAST MEDICAL & SURGICAL HISTORY:  CAD (coronary artery disease)      Essential hypertension      HLD (hyperlipidemia)      BPH (benign prostatic hyperplasia)      HTN (hypertension)      Dementia      CAD (coronary artery disease)      S/P CABG (coronary artery bypass graft)      Stenosis of right carotid artery  s/p endartectomy      S/P drug eluting coronary stent placement          SOCIAL HISTORY: Substance Use (street drugs): ( x ) never used  (  ) other:    FAMILY HISTORY:  FH: hypertension (Mother)          PHYSICAL EXAM:  T(C): 36.2 (01-02-25 @ 13:19), Max: 37.1 (01-01-25 @ 15:38)  HR: 90 (01-02-25 @ 13:19) (90 - 105)  BP: 128/80 (01-02-25 @ 13:19) (122/94 - 139/86)  RR: 18 (01-02-25 @ 13:19) (17 - 18)  SpO2: 99% (01-02-25 @ 13:19) (94% - 99%)  Wt(kg): --  I&O's Summary    01 Jan 2025 07:01  -  02 Jan 2025 07:00  --------------------------------------------------------  IN: 150 mL / OUT: 925 mL / NET: -775 mL          GENERAL: NAD  EYES:   PERRLA   ENMT:   Moist mucous membranes, Good dentition, No lesions  Cardiovascular: Normal S1 S2, No JVD, No murmurs, No edema  Respiratory: Lungs clear to auscultation	  Gastrointestinal:  Soft, Non-tender, + BS	  Extremities: no edema                                      9.4    7.48  )-----------( 233      ( 02 Jan 2025 06:09 )             30.5     01-02    138  |  104  |  16  ----------------------------<  79  3.6   |  25  |  0.94    Ca    8.9      02 Jan 2025 06:09  Phos  3.0     01-02  Mg     1.90     01-02      proBNP:   Lipid Profile:   HgA1c:   TSH:     Consultant(s) Notes Reviewed:  [x ] YES  [ ] NO    Care Discussed with Consultants/Other Providers [ x] YES  [ ] NO    Imaging Personally Reviewed independently:  [x] YES  [ ] NO    All labs, radiologic studies, vitals, orders and medications list reviewed. Patient is seen and examined at bedside. Case discussed with medical team.

## 2025-01-02 NOTE — PROGRESS NOTE ADULT - ASSESSMENT
89M Dementia, PAD, DVT - no longer on A/C, BPH s/p chronic carter, Rt facial droop as baseline (No CVA) p/w subacute CVA, acute hypoxic respiratory failure w/ Rt mod pleural effusion w/ LIZZETTE, complicated UTI, acute metabolic encephalopathy, PE, large Rt pleural effusion, mod/sev AS, flu exposure, GI bleed, acute on chronic anemia s/p 2u PRBC on 1/1. Resident

## 2025-01-03 DIAGNOSIS — L08.9 LOCAL INFECTION OF THE SKIN AND SUBCUTANEOUS TISSUE, UNSPECIFIED: ICD-10-CM

## 2025-01-03 LAB
ALBUMIN SERPL ELPH-MCNC: 2.7 G/DL — LOW (ref 3.3–5)
ALP SERPL-CCNC: 58 U/L — SIGNIFICANT CHANGE UP (ref 40–120)
ALT FLD-CCNC: 31 U/L — SIGNIFICANT CHANGE UP (ref 4–41)
ANION GAP SERPL CALC-SCNC: 15 MMOL/L — HIGH (ref 7–14)
APTT BLD: 25.3 SEC — SIGNIFICANT CHANGE UP (ref 24.5–35.6)
AST SERPL-CCNC: 27 U/L — SIGNIFICANT CHANGE UP (ref 4–40)
BILIRUB SERPL-MCNC: 0.4 MG/DL — SIGNIFICANT CHANGE UP (ref 0.2–1.2)
BUN SERPL-MCNC: 15 MG/DL — SIGNIFICANT CHANGE UP (ref 7–23)
CALCIUM SERPL-MCNC: 9 MG/DL — SIGNIFICANT CHANGE UP (ref 8.4–10.5)
CHLORIDE SERPL-SCNC: 102 MMOL/L — SIGNIFICANT CHANGE UP (ref 98–107)
CO2 SERPL-SCNC: 22 MMOL/L — SIGNIFICANT CHANGE UP (ref 22–31)
CREAT SERPL-MCNC: 0.98 MG/DL — SIGNIFICANT CHANGE UP (ref 0.5–1.3)
EGFR: 74 ML/MIN/1.73M2 — SIGNIFICANT CHANGE UP
GLUCOSE SERPL-MCNC: 77 MG/DL — SIGNIFICANT CHANGE UP (ref 70–99)
HCT VFR BLD CALC: 29.9 % — LOW (ref 39–50)
HCT VFR BLD CALC: 30.7 % — LOW (ref 39–50)
HGB BLD-MCNC: 9.5 G/DL — LOW (ref 13–17)
HGB BLD-MCNC: 9.5 G/DL — LOW (ref 13–17)
MAGNESIUM SERPL-MCNC: 1.9 MG/DL — SIGNIFICANT CHANGE UP (ref 1.6–2.6)
MCHC RBC-ENTMCNC: 26.2 PG — LOW (ref 27–34)
MCHC RBC-ENTMCNC: 26.4 PG — LOW (ref 27–34)
MCHC RBC-ENTMCNC: 30.9 G/DL — LOW (ref 32–36)
MCHC RBC-ENTMCNC: 31.8 G/DL — LOW (ref 32–36)
MCV RBC AUTO: 82.6 FL — SIGNIFICANT CHANGE UP (ref 80–100)
MCV RBC AUTO: 85.3 FL — SIGNIFICANT CHANGE UP (ref 80–100)
NRBC # BLD: 0 /100 WBCS — SIGNIFICANT CHANGE UP (ref 0–0)
NRBC # BLD: 0 /100 WBCS — SIGNIFICANT CHANGE UP (ref 0–0)
NRBC # FLD: 0 K/UL — SIGNIFICANT CHANGE UP (ref 0–0)
NRBC # FLD: 0 K/UL — SIGNIFICANT CHANGE UP (ref 0–0)
PHOSPHATE SERPL-MCNC: 2.8 MG/DL — SIGNIFICANT CHANGE UP (ref 2.5–4.5)
PLATELET # BLD AUTO: 235 K/UL — SIGNIFICANT CHANGE UP (ref 150–400)
PLATELET # BLD AUTO: 237 K/UL — SIGNIFICANT CHANGE UP (ref 150–400)
POTASSIUM SERPL-MCNC: 3.6 MMOL/L — SIGNIFICANT CHANGE UP (ref 3.5–5.3)
POTASSIUM SERPL-SCNC: 3.6 MMOL/L — SIGNIFICANT CHANGE UP (ref 3.5–5.3)
PROT SERPL-MCNC: 6.4 G/DL — SIGNIFICANT CHANGE UP (ref 6–8.3)
RBC # BLD: 3.6 M/UL — LOW (ref 4.2–5.8)
RBC # BLD: 3.62 M/UL — LOW (ref 4.2–5.8)
RBC # FLD: 17.1 % — HIGH (ref 10.3–14.5)
RBC # FLD: 17.6 % — HIGH (ref 10.3–14.5)
SODIUM SERPL-SCNC: 139 MMOL/L — SIGNIFICANT CHANGE UP (ref 135–145)
WBC # BLD: 7.97 K/UL — SIGNIFICANT CHANGE UP (ref 3.8–10.5)
WBC # BLD: 8.68 K/UL — SIGNIFICANT CHANGE UP (ref 3.8–10.5)
WBC # FLD AUTO: 7.97 K/UL — SIGNIFICANT CHANGE UP (ref 3.8–10.5)
WBC # FLD AUTO: 8.68 K/UL — SIGNIFICANT CHANGE UP (ref 3.8–10.5)

## 2025-01-03 PROCEDURE — 93970 EXTREMITY STUDY: CPT | Mod: 26

## 2025-01-03 PROCEDURE — 99233 SBSQ HOSP IP/OBS HIGH 50: CPT

## 2025-01-03 RX ORDER — HEPARIN SODIUM 1000 [USP'U]/ML
4500 INJECTION, SOLUTION INTRAVENOUS; SUBCUTANEOUS EVERY 6 HOURS
Refills: 0 | Status: DISCONTINUED | OUTPATIENT
Start: 2025-01-03 | End: 2025-01-03

## 2025-01-03 RX ORDER — HEPARIN SODIUM 1000 [USP'U]/ML
INJECTION, SOLUTION INTRAVENOUS; SUBCUTANEOUS
Qty: 25000 | Refills: 0 | Status: DISCONTINUED | OUTPATIENT
Start: 2025-01-03 | End: 2025-01-03

## 2025-01-03 RX ORDER — PANTOPRAZOLE 40 MG/1
40 TABLET, DELAYED RELEASE ORAL EVERY 12 HOURS
Refills: 0 | Status: DISCONTINUED | OUTPATIENT
Start: 2025-01-03 | End: 2025-01-08

## 2025-01-03 RX ORDER — HEPARIN SODIUM 1000 [USP'U]/ML
2000 INJECTION, SOLUTION INTRAVENOUS; SUBCUTANEOUS EVERY 6 HOURS
Refills: 0 | Status: DISCONTINUED | OUTPATIENT
Start: 2025-01-03 | End: 2025-01-03

## 2025-01-03 RX ADMIN — Medication 17 GRAM(S): at 17:09

## 2025-01-03 RX ADMIN — PIPERACILLIN AND TAZOBACTAM 25 GRAM(S): 3; .375 INJECTION, POWDER, LYOPHILIZED, FOR SOLUTION INTRAVENOUS at 05:18

## 2025-01-03 RX ADMIN — Medication 1 TABLET(S): at 13:32

## 2025-01-03 RX ADMIN — TRAZODONE HYDROCHLORIDE 50 MILLIGRAM(S): 150 TABLET ORAL at 22:30

## 2025-01-03 RX ADMIN — TAMSULOSIN HYDROCHLORIDE 0.4 MILLIGRAM(S): 0.4 CAPSULE ORAL at 22:28

## 2025-01-03 RX ADMIN — LATANOPROST 1 DROP(S): 50 SOLUTION OPHTHALMIC at 22:30

## 2025-01-03 RX ADMIN — PIPERACILLIN AND TAZOBACTAM 25 GRAM(S): 3; .375 INJECTION, POWDER, LYOPHILIZED, FOR SOLUTION INTRAVENOUS at 22:28

## 2025-01-03 RX ADMIN — CARVEDILOL 6.25 MILLIGRAM(S): 25 TABLET, FILM COATED ORAL at 17:07

## 2025-01-03 RX ADMIN — OSELTAMIVIR 30 MILLIGRAM(S): 75 CAPSULE ORAL at 05:18

## 2025-01-03 RX ADMIN — CHLORHEXIDINE GLUCONATE 1 APPLICATION(S): 1.2 RINSE ORAL at 13:32

## 2025-01-03 RX ADMIN — Medication 5 MILLIGRAM(S): at 13:32

## 2025-01-03 RX ADMIN — PIPERACILLIN AND TAZOBACTAM 25 GRAM(S): 3; .375 INJECTION, POWDER, LYOPHILIZED, FOR SOLUTION INTRAVENOUS at 13:33

## 2025-01-03 RX ADMIN — CARVEDILOL 6.25 MILLIGRAM(S): 25 TABLET, FILM COATED ORAL at 05:18

## 2025-01-03 RX ADMIN — Medication 17 GRAM(S): at 05:18

## 2025-01-03 RX ADMIN — PANTOPRAZOLE 40 MILLIGRAM(S): 40 TABLET, DELAYED RELEASE ORAL at 17:07

## 2025-01-03 NOTE — PROGRESS NOTE ADULT - PROBLEM SELECTOR PLAN 6
Complicated UTI d/t chronic carter  - continue Zosyn for 5-7 days  - F/U UCx/S  ucx with more than 3 organisms, suspect pt is colonized, can consider dc abx; pt non toxic Complicated UTI d/t chronic carter  - continue Zosyn for 5-7 days  - F/U UCx/S  ucx with more than 3 organisms, suspect pt is colonized, can consider dc abx; pt non toxic    #skin infection on neck  - suspect cyst as family states that it was drained in the past but came back  - would not incise unless ruptures on its own.  - low clinical concern for abscess or infection - patient has been treated with zosyn IV for UTI.

## 2025-01-03 NOTE — PROGRESS NOTE ADULT - ASSESSMENT
EKG - NSR RBBB unchanged    2D echo show EF 45%, severe pulm HTN, mod to severe AS valve area 1.1 cm2 RV mildly dilated with decreased function    a/p     1) GIB  - melena w/ decreased Hb, held heparin gtt, asa,plavix, s/p PRBC 2 units, started PPI gtt  - to resume AC if H/H stable  -f/u GI recs    2) B/L PE - IV heparin hold for GIB, monitor h/h, severe pulm HTN and reduced RV function, hemodynamically stable no sign of right heart failure    3) CAD s/p CABG - EF mildly reduced , EKG unchanged , BNP > 12K but not in clinical CHF, held asa,plavix for GIB    4) Mod to Sev AS - valve are 1.1cm2 on 2d echo with low gradients, pt not a candidate for intervention sec to dementia conservative management

## 2025-01-03 NOTE — CHART NOTE - NSCHARTNOTEFT_GEN_A_CORE
Discussed with Dr. Jon. Will trail patient on heparin drip today. Discussed will consult IR for IVC Filter.

## 2025-01-03 NOTE — PROGRESS NOTE ADULT - SUBJECTIVE AND OBJECTIVE BOX
Brett Prince MD   Interventional Cardiology / Endovascular Specialist   [place] Office : [phone] [place] [address] [place] [address]  Tel:    [place] Office : [address] [facility] [place] [address]  Tel: [phone]   Cell : [phone]      Pt seen and examined at bedside, not in distress  	  MEDICATIONS:  carvedilol 6.25 milliGRAM(s) Oral every 12 hours    oseltamivir 30 milliGRAM(s) Oral every 24 hours  piperacillin/tazobactam IVPB.. 3.375 Gram(s) IV Intermittent every 8 hours      acetaminophen     Tablet .. 650 milliGRAM(s) Oral every 6 hours PRN  LORazepam   Injectable 2 milliGRAM(s) IV Push once PRN  melatonin 3 milliGRAM(s) Oral at bedtime PRN  ondansetron Injectable 4 milliGRAM(s) IV Push every 8 hours PRN  traZODone 50 milliGRAM(s) Oral at bedtime    aluminum hydroxide/magnesium hydroxide/simethicone Suspension 30 milliLiter(s) Oral every 4 hours PRN  pantoprazole Infusion 8 mG/Hr IV Continuous <Continuous>  polyethylene glycol 3350 17 Gram(s) Oral two times a day    finasteride 5 milliGRAM(s) Oral daily    chlorhexidine 2% Cloths 1 Application(s) Topical daily  latanoprost 0.005% Ophthalmic Solution 1 Drop(s) Both EYES at bedtime  multivitamin 1 Tablet(s) Oral daily  tamsulosin 0.4 milliGRAM(s) Oral at bedtime      PAST MEDICAL/SURGICAL HISTORY  PAST MEDICAL & SURGICAL HISTORY:  CAD (coronary artery disease)      Essential hypertension      HLD (hyperlipidemia)      BPH (benign prostatic hyperplasia)      HTN (hypertension)      Dementia      CAD (coronary artery disease)      S/P CABG (coronary artery bypass graft)      Stenosis of right carotid artery  s/p endartectomy      S/P drug eluting coronary stent placement          SOCIAL HISTORY: Substance Use (street drugs): ( x ) never used  (  ) other:    FAMILY HISTORY:  FH: hypertension (Mother)          PHYSICAL EXAM:  T(C): 36.7 (01-03-25 @ 09:00), Max: 37 (01-02-25 @ 17:57)  HR: 76 (01-03-25 @ 09:00) (76 - 95)  BP: 110/80 (01-03-25 @ 09:00) (110/80 - 157/79)  RR: 18 (01-03-25 @ 09:00) (18 - 18)  SpO2: 97% (01-03-25 @ 09:00) (96% - 99%)  Wt(kg): --  I&O's Summary    03 Jan 2025 07:01  -  03 Jan 2025 14:12  --------------------------------------------------------  IN: 200 mL / OUT: 0 mL / NET: 200 mL          GENERAL: NAD  EYES:   PERRLA   ENMT:   Moist mucous membranes, Good dentition, No lesions  Cardiovascular: Normal S1 S2, No JVD, No murmurs, No edema  Respiratory: Lungs clear to auscultation	  Gastrointestinal:  Soft, Non-tender, + BS	  Extremities: no edema                                    9.5    7.97  )-----------( 235      ( 03 Jan 2025 06:35 )             29.9     01-03    139  |  102  |  15  ----------------------------<  77  3.6   |  22  |  0.98    Ca    9.0      03 Jan 2025 06:35  Phos  2.8     01-03  Mg     1.90     01-03    TPro  6.4  /  Alb  2.7[L]  /  TBili  0.4  /  DBili  x   /  AST  27  /  ALT  31  /  AlkPhos  58  01-03    proBNP:   Lipid Profile:   HgA1c:   TSH:     Consultant(s) Notes Reviewed:  [x ] YES  [ ] NO    Care Discussed with Consultants/Other Providers [ x] YES  [ ] NO    Imaging Personally Reviewed independently:  [x] YES  [ ] NO    All labs, radiologic studies, vitals, orders and medications list reviewed. Patient is seen and examined at bedside. Case discussed with medical team.

## 2025-01-03 NOTE — PROGRESS NOTE ADULT - ASSESSMENT
89M Dementia, PAD, DVT - no longer on A/C, BPH s/p chronic carter, Rt facial droop as baseline (No CVA) p/w subacute CVA, acute hypoxic respiratory failure w/ Rt mod pleural effusion w/ LIZZETTE, complicated UTI, acute metabolic encephalopathy, PE, large Rt pleural effusion, mod/sev AS, flu exposure, GI bleed, acute on chronic anemia s/p 2u PRBC on 1/1. 89M Dementia, PAD, DVT - no longer on A/C, BPH s/p chronic carter, Rt facial droop as baseline (No CVA) p/w subacute CVA, acute hypoxic respiratory failure w/ Rt mod pleural effusion w/ LIZZETTE, complicated UTI, acute metabolic encephalopathy, PE, large Rt pleural effusion, mod/sev AS, flu exposure, GI bleed, acute on chronic anemia s/p 2u PRBC on 1/1, skin infection/abscess.

## 2025-01-03 NOTE — PROGRESS NOTE ADULT - SUBJECTIVE AND OBJECTIVE BOX
[facility] Division of Hospital Medicine  David Jon MD  Contact via Microsoft Teams (Mon-Fri 8AM-4PM)  Otherwise: contact Hospitalist in Charge at [phone]    Patient is a 89y old  Male who presents with a chief complaint of weakness (03 Jan 2025 11:10)    SUBJECTIVE / OVERNIGHT EVENTS:  Patient offers no new complaints. No new episode of melena. Tolerating CLD.  No F/C, N/V, CP, SOB, Cough, lightheadedness, dizziness, abdominal pain, diarrhea, dysuria.    MEDICATIONS  (STANDING):  carvedilol 6.25 milliGRAM(s) Oral every 12 hours  chlorhexidine 2% Cloths 1 Application(s) Topical daily  finasteride 5 milliGRAM(s) Oral daily  heparin  Infusion.  Unit(s)/Hr (11 mL/Hr) IV Continuous <Continuous>  latanoprost 0.005% Ophthalmic Solution 1 Drop(s) Both EYES at bedtime  multivitamin 1 Tablet(s) Oral daily  oseltamivir 30 milliGRAM(s) Oral every 24 hours  pantoprazole Infusion 8 mG/Hr (10 mL/Hr) IV Continuous <Continuous>  piperacillin/tazobactam IVPB.. 3.375 Gram(s) IV Intermittent every 8 hours  polyethylene glycol 3350 17 Gram(s) Oral two times a day  tamsulosin 0.4 milliGRAM(s) Oral at bedtime  traZODone 50 milliGRAM(s) Oral at bedtime    MEDICATIONS  (PRN):  acetaminophen     Tablet .. 650 milliGRAM(s) Oral every 6 hours PRN Temp greater or equal to 38C (100.4F), Mild Pain (1 - 3)  aluminum hydroxide/magnesium hydroxide/simethicone Suspension 30 milliLiter(s) Oral every 4 hours PRN Dyspepsia  heparin   Injectable 4500 Unit(s) IV Push every 6 hours PRN For aPTT less than 40  heparin   Injectable 2000 Unit(s) IV Push every 6 hours PRN For aPTT between 40 - 57  LORazepam   Injectable 2 milliGRAM(s) IV Push once PRN Seizure  melatonin 3 milliGRAM(s) Oral at bedtime PRN Insomnia  ondansetron Injectable 4 milliGRAM(s) IV Push every 8 hours PRN Nausea and/or Vomiting      Vital Signs Last 24 Hrs  T(C): 36.3 (03 Jan 2025 13:00), Max: 37 (02 Jan 2025 17:57)  T(F): 97.3 (03 Jan 2025 13:00), Max: 98.6 (02 Jan 2025 17:57)  HR: 79 (03 Jan 2025 13:00) (76 - 95)  BP: 130/84 (03 Jan 2025 13:00) (110/80 - 157/79)  BP(mean): --  RR: 18 (03 Jan 2025 13:00) (18 - 18)  SpO2: 98% (03 Jan 2025 13:00) (96% - 98%)    Parameters below as of 03 Jan 2025 13:00  Patient On (Oxygen Delivery Method): room air      CAPILLARY BLOOD GLUCOSE        I&O's Summary    03 Jan 2025 07:01  -  03 Jan 2025 15:09  --------------------------------------------------------  IN: 430 mL / OUT: 0 mL / NET: 430 mL        PHYSICAL EXAM:  CONSTITUTIONAL: NAD, appears comfortable, thin  EYES: PERRLA; conjunctiva and sclera clear  ENMT: Moist oral mucosa, no pharyngeal injection or exudates; normal dentition  RESPIRATORY: Normal respiratory effort; grossly b/l AE  CARDIOVASCULAR: Regular rate and rhythm; No lower extremity edema  ABDOMEN: Nontender to palpation, normoactive bowel sounds; suprapubic cath draining clear yellow urine  MUSCULOSKELETAL:  no clubbing or cyanosis of digits; no joint swelling or tenderness to palpation  PSYCH: calm, coop; affect appropriate  NEUROLOGY: CN 2-12 are intact and symmetric; no gross sensory deficits   SKIN: No rashes; no palpable lesions    LABS:                        9.5    7.97  )-----------( 235      ( 03 Jan 2025 06:35 )             29.9     01-03    139  |  102  |  15  ----------------------------<  77  3.6   |  22  |  0.98    Ca    9.0      03 Jan 2025 06:35  Phos  2.8     01-03  Mg     1.90     01-03    TPro  6.4  /  Alb  2.7[L]  /  TBili  0.4  /  DBili  x   /  AST  27  /  ALT  31  /  AlkPhos  58  01-03    PTT - ( 03 Jan 2025 13:39 )  PTT:25.3 sec      Urinalysis Basic - ( 03 Jan 2025 06:35 )    Color: x / Appearance: x / SG: x / pH: x  Gluc: 77 mg/dL / Ketone: x  / Bili: x / Urobili: x   Blood: x / Protein: x / Nitrite: x   Leuk Esterase: x / RBC: x / WBC x   Sq Epi: x / Non Sq Epi: x / Bacteria: x        RADIOLOGY & ADDITIONAL TESTS:    Imaging Personally Reviewed:    Care Discussed with Consultants/Other Providers:   [facility] Division of Hospital Medicine  David Jon MD  Contact via Microsoft Teams (Mon-Fri 8AM-4PM)  Otherwise: contact Hospitalist in Charge at [phone]    Patient is a 89y old  Male who presents with a chief complaint of weakness (03 Jan 2025 11:10)    SUBJECTIVE / OVERNIGHT EVENTS:  Patient offers no new complaints. No new episode of melena. Tolerating CLD.  No F/C, N/V, CP, SOB, Cough, lightheadedness, dizziness, abdominal pain, diarrhea, dysuria.    MEDICATIONS  (STANDING):  carvedilol 6.25 milliGRAM(s) Oral every 12 hours  chlorhexidine 2% Cloths 1 Application(s) Topical daily  finasteride 5 milliGRAM(s) Oral daily  heparin  Infusion.  Unit(s)/Hr (11 mL/Hr) IV Continuous <Continuous>  latanoprost 0.005% Ophthalmic Solution 1 Drop(s) Both EYES at bedtime  multivitamin 1 Tablet(s) Oral daily  oseltamivir 30 milliGRAM(s) Oral every 24 hours  pantoprazole Infusion 8 mG/Hr (10 mL/Hr) IV Continuous <Continuous>  piperacillin/tazobactam IVPB.. 3.375 Gram(s) IV Intermittent every 8 hours  polyethylene glycol 3350 17 Gram(s) Oral two times a day  tamsulosin 0.4 milliGRAM(s) Oral at bedtime  traZODone 50 milliGRAM(s) Oral at bedtime    MEDICATIONS  (PRN):  acetaminophen     Tablet .. 650 milliGRAM(s) Oral every 6 hours PRN Temp greater or equal to 38C (100.4F), Mild Pain (1 - 3)  aluminum hydroxide/magnesium hydroxide/simethicone Suspension 30 milliLiter(s) Oral every 4 hours PRN Dyspepsia  heparin   Injectable 4500 Unit(s) IV Push every 6 hours PRN For aPTT less than 40  heparin   Injectable 2000 Unit(s) IV Push every 6 hours PRN For aPTT between 40 - 57  LORazepam   Injectable 2 milliGRAM(s) IV Push once PRN Seizure  melatonin 3 milliGRAM(s) Oral at bedtime PRN Insomnia  ondansetron Injectable 4 milliGRAM(s) IV Push every 8 hours PRN Nausea and/or Vomiting      Vital Signs Last 24 Hrs  T(C): 36.3 (03 Jan 2025 13:00), Max: 37 (02 Jan 2025 17:57)  T(F): 97.3 (03 Jan 2025 13:00), Max: 98.6 (02 Jan 2025 17:57)  HR: 79 (03 Jan 2025 13:00) (76 - 95)  BP: 130/84 (03 Jan 2025 13:00) (110/80 - 157/79)  BP(mean): --  RR: 18 (03 Jan 2025 13:00) (18 - 18)  SpO2: 98% (03 Jan 2025 13:00) (96% - 98%)    Parameters below as of 03 Jan 2025 13:00  Patient On (Oxygen Delivery Method): room air      CAPILLARY BLOOD GLUCOSE        I&O's Summary    03 Jan 2025 07:01  -  03 Jan 2025 15:09  --------------------------------------------------------  IN: 430 mL / OUT: 0 mL / NET: 430 mL        PHYSICAL EXAM:  CONSTITUTIONAL: NAD, appears comfortable, thin  EYES: PERRLA; conjunctiva and sclera clear  ENMT: Moist oral mucosa, no pharyngeal injection or exudates; normal dentition  RESPIRATORY: Normal respiratory effort; grossly b/l AE  CARDIOVASCULAR: Regular rate and rhythm; No lower extremity edema  ABDOMEN: Nontender to palpation, normoactive bowel sounds; suprapubic cath draining clear yellow urine  MUSCULOSKELETAL:  no clubbing or cyanosis of digits; no joint swelling or tenderness to palpation  PSYCH: calm, coop; affect appropriate  NEUROLOGY: CN 2-12 are intact and symmetric; no gross sensory deficits   SKIN: No rashes; no palpable lesions, neck skin collection    LABS:                        9.5    7.97  )-----------( 235      ( 03 Jan 2025 06:35 )             29.9     01-03    139  |  102  |  15  ----------------------------<  77  3.6   |  22  |  0.98    Ca    9.0      03 Jan 2025 06:35  Phos  2.8     01-03  Mg     1.90     01-03    TPro  6.4  /  Alb  2.7[L]  /  TBili  0.4  /  DBili  x   /  AST  27  /  ALT  31  /  AlkPhos  58  01-03    PTT - ( 03 Jan 2025 13:39 )  PTT:25.3 sec      Urinalysis Basic - ( 03 Jan 2025 06:35 )    Color: x / Appearance: x / SG: x / pH: x  Gluc: 77 mg/dL / Ketone: x  / Bili: x / Urobili: x   Blood: x / Protein: x / Nitrite: x   Leuk Esterase: x / RBC: x / WBC x   Sq Epi: x / Non Sq Epi: x / Bacteria: x        RADIOLOGY & ADDITIONAL TESTS:    Imaging Personally Reviewed:    Care Discussed with Consultants/Other Providers:

## 2025-01-03 NOTE — PROGRESS NOTE ADULT - PROBLEM SELECTOR PLAN 2
concern for GI bleed given melena.  - likely d/t anticoagulation.  - d/c'd ASA, hep gtt  - on Protonix IV gtt - will inquire when to transition to IV BID by GI.  GI consulted  mild hypotension, improved with PRBC  monitor closely if persistent hypotension/bleeding may need MICU eval

## 2025-01-04 LAB
ALBUMIN SERPL ELPH-MCNC: 2.7 G/DL — LOW (ref 3.3–5)
ALP SERPL-CCNC: 59 U/L — SIGNIFICANT CHANGE UP (ref 40–120)
ALT FLD-CCNC: 25 U/L — SIGNIFICANT CHANGE UP (ref 4–41)
ANION GAP SERPL CALC-SCNC: 10 MMOL/L — SIGNIFICANT CHANGE UP (ref 7–14)
APTT BLD: 44.3 SEC — HIGH (ref 24.5–35.6)
AST SERPL-CCNC: 28 U/L — SIGNIFICANT CHANGE UP (ref 4–40)
BILIRUB SERPL-MCNC: 0.3 MG/DL — SIGNIFICANT CHANGE UP (ref 0.2–1.2)
BUN SERPL-MCNC: 14 MG/DL — SIGNIFICANT CHANGE UP (ref 7–23)
CALCIUM SERPL-MCNC: 9 MG/DL — SIGNIFICANT CHANGE UP (ref 8.4–10.5)
CHLORIDE SERPL-SCNC: 104 MMOL/L — SIGNIFICANT CHANGE UP (ref 98–107)
CO2 SERPL-SCNC: 25 MMOL/L — SIGNIFICANT CHANGE UP (ref 22–31)
CREAT SERPL-MCNC: 1.09 MG/DL — SIGNIFICANT CHANGE UP (ref 0.5–1.3)
EGFR: 65 ML/MIN/1.73M2 — SIGNIFICANT CHANGE UP
GLUCOSE SERPL-MCNC: 115 MG/DL — HIGH (ref 70–99)
HCT VFR BLD CALC: 32.2 % — LOW (ref 39–50)
HGB BLD-MCNC: 9.8 G/DL — LOW (ref 13–17)
MAGNESIUM SERPL-MCNC: 2.1 MG/DL — SIGNIFICANT CHANGE UP (ref 1.6–2.6)
MCHC RBC-ENTMCNC: 26.4 PG — LOW (ref 27–34)
MCHC RBC-ENTMCNC: 30.4 G/DL — LOW (ref 32–36)
MCV RBC AUTO: 86.8 FL — SIGNIFICANT CHANGE UP (ref 80–100)
NRBC # BLD: 0 /100 WBCS — SIGNIFICANT CHANGE UP (ref 0–0)
NRBC # FLD: 0 K/UL — SIGNIFICANT CHANGE UP (ref 0–0)
PHOSPHATE SERPL-MCNC: 3.4 MG/DL — SIGNIFICANT CHANGE UP (ref 2.5–4.5)
PLATELET # BLD AUTO: 234 K/UL — SIGNIFICANT CHANGE UP (ref 150–400)
POTASSIUM SERPL-MCNC: 4.5 MMOL/L — SIGNIFICANT CHANGE UP (ref 3.5–5.3)
POTASSIUM SERPL-SCNC: 4.5 MMOL/L — SIGNIFICANT CHANGE UP (ref 3.5–5.3)
PROT SERPL-MCNC: 6.2 G/DL — SIGNIFICANT CHANGE UP (ref 6–8.3)
RBC # BLD: 3.71 M/UL — LOW (ref 4.2–5.8)
RBC # FLD: 18.8 % — HIGH (ref 10.3–14.5)
SODIUM SERPL-SCNC: 139 MMOL/L — SIGNIFICANT CHANGE UP (ref 135–145)
WBC # BLD: 9.03 K/UL — SIGNIFICANT CHANGE UP (ref 3.8–10.5)
WBC # FLD AUTO: 9.03 K/UL — SIGNIFICANT CHANGE UP (ref 3.8–10.5)

## 2025-01-04 PROCEDURE — 99233 SBSQ HOSP IP/OBS HIGH 50: CPT

## 2025-01-04 RX ORDER — HEPARIN SODIUM 1000 [USP'U]/ML
800 INJECTION, SOLUTION INTRAVENOUS; SUBCUTANEOUS
Qty: 25000 | Refills: 0 | Status: DISCONTINUED | OUTPATIENT
Start: 2025-01-04 | End: 2025-01-05

## 2025-01-04 RX ORDER — BACITRACIN 500 UNIT/G
1 OINTMENT (GRAM) TOPICAL
Refills: 0 | Status: DISCONTINUED | OUTPATIENT
Start: 2025-01-04 | End: 2025-01-09

## 2025-01-04 RX ADMIN — TRAZODONE HYDROCHLORIDE 50 MILLIGRAM(S): 150 TABLET ORAL at 21:41

## 2025-01-04 RX ADMIN — Medication 1 TABLET(S): at 12:27

## 2025-01-04 RX ADMIN — Medication 5 MILLIGRAM(S): at 12:27

## 2025-01-04 RX ADMIN — TAMSULOSIN HYDROCHLORIDE 0.4 MILLIGRAM(S): 0.4 CAPSULE ORAL at 21:41

## 2025-01-04 RX ADMIN — CHLORHEXIDINE GLUCONATE 1 APPLICATION(S): 1.2 RINSE ORAL at 12:14

## 2025-01-04 RX ADMIN — Medication 17 GRAM(S): at 17:12

## 2025-01-04 RX ADMIN — Medication 1 APPLICATION(S): at 17:12

## 2025-01-04 RX ADMIN — Medication 17 GRAM(S): at 05:34

## 2025-01-04 RX ADMIN — LATANOPROST 1 DROP(S): 50 SOLUTION OPHTHALMIC at 21:41

## 2025-01-04 RX ADMIN — HEPARIN SODIUM 8 UNIT(S)/HR: 1000 INJECTION, SOLUTION INTRAVENOUS; SUBCUTANEOUS at 19:14

## 2025-01-04 RX ADMIN — CARVEDILOL 6.25 MILLIGRAM(S): 25 TABLET, FILM COATED ORAL at 05:35

## 2025-01-04 RX ADMIN — CARVEDILOL 6.25 MILLIGRAM(S): 25 TABLET, FILM COATED ORAL at 17:12

## 2025-01-04 RX ADMIN — PIPERACILLIN AND TAZOBACTAM 25 GRAM(S): 3; .375 INJECTION, POWDER, LYOPHILIZED, FOR SOLUTION INTRAVENOUS at 21:41

## 2025-01-04 RX ADMIN — PANTOPRAZOLE 40 MILLIGRAM(S): 40 TABLET, DELAYED RELEASE ORAL at 17:12

## 2025-01-04 RX ADMIN — PANTOPRAZOLE 40 MILLIGRAM(S): 40 TABLET, DELAYED RELEASE ORAL at 05:35

## 2025-01-04 RX ADMIN — OSELTAMIVIR 30 MILLIGRAM(S): 75 CAPSULE ORAL at 05:34

## 2025-01-04 RX ADMIN — PIPERACILLIN AND TAZOBACTAM 25 GRAM(S): 3; .375 INJECTION, POWDER, LYOPHILIZED, FOR SOLUTION INTRAVENOUS at 13:10

## 2025-01-04 RX ADMIN — HEPARIN SODIUM 8 UNIT(S)/HR: 1000 INJECTION, SOLUTION INTRAVENOUS; SUBCUTANEOUS at 13:10

## 2025-01-04 RX ADMIN — PIPERACILLIN AND TAZOBACTAM 25 GRAM(S): 3; .375 INJECTION, POWDER, LYOPHILIZED, FOR SOLUTION INTRAVENOUS at 05:33

## 2025-01-04 NOTE — PROGRESS NOTE ADULT - PROBLEM SELECTOR PLAN 6
Complicated UTI d/t chronic carter  - continue Zosyn for 5-7 days  - F/U UCx/S  ucx with more than 3 organisms, suspect pt is colonized, can consider dc abx; pt non toxic    #skin infection on neck/cyst  - suspect cyst as family states that it was drained in the past but came back  - would not incise unless ruptures on its own.  - low clinical concern for abscess or infection - patient has been treated with zosyn IV for UTI.

## 2025-01-04 NOTE — CONSULT NOTE ADULT - ASSESSMENT
Interventional Radiology    Evaluate for Procedure: IVC Filter Placement     HPI: 89M Dementia, PAD, DVT - no longer on A/C, BPH s/p chronic carter, Rt facial droop as baseline (No CVA) p/w subacute CVA, acute hypoxic respiratory failure w/ Rt mod pleural effusion w/ LIZZETTE, complicated UTI, acute metabolic encephalopathy, PE, large Rt pleural effusion, mod/sev AS, flu exposure, GI bleed, acute on chronic anemia s/p 2u PRBC on 1/1, L neck cyst    Allergies: ramipril (Angioedema)    Medications (Abx/Cardiac/Anticoagulation/Blood Products)    carvedilol: 6.25 milliGRAM(s) Oral (01-04 @ 17:12)  heparin  Infusion: 8 mL/Hr IV Continuous (01-04 @ 13:11)  oseltamivir: 30 milliGRAM(s) Oral (01-04 @ 05:34)  piperacillin/tazobactam IVPB..: 25 mL/Hr IV Intermittent (01-04 @ 13:10)    Data:    T(C): 36.6  HR: 70  BP: 135/92  RR: 18  SpO2: 100%    -WBC 9.03 / HgB 9.8 / Hct 32.2 / Plt 234  -Na 139 / Cl 104 / BUN 14 / Glucose 115  -K 4.5 / CO2 25 / Cr 1.09  -ALT 25 / Alk Phos 59 / T.Bili 0.3  -INR -- / PTT 44.3      Radiology:   - relevant imaging reviewed     Assessment/Plan:   89M dementia, PAD, mod/severe AS, DVT admit with subacute CVA, AHRF, UTI, encephalopathy. Course c/b RUL segmental and MICHELLE/BL lower lobe subsegmental PE and BL popliteal DVT. Inability to tolerate AC iso melena req 2u pRBC on 1/1 with adequate response, holding asa and heparin gtt. On abx, afebrile, no leukocytosis, BCx 12/26 neg.     - Plan for IVC filter placement likely Monday 1/6 given unable to tolerate AC  - cbc, bmp, coags am of procedure   - no need for NPO   - pre-procedure note  - IR procedure order under Dr. Sheffield    --  Yaakov Malone MD, PGY-4  Vascular and Interventional Radiology   Available on Microsoft Teams    - Non-emergent consults: Place IR consult order in [place]  - Emergent issues (pager): [facility] [phone]; [facility] [phone]; [id number]  - Scheduling questions: [facility] [phone]; [facility] [phone]  - Clinic/outpatient booking: [facility] [phone]; [facility] [phone]

## 2025-01-04 NOTE — PROGRESS NOTE ADULT - SUBJECTIVE AND OBJECTIVE BOX
[facility] Division of Hospital Medicine  Mackenzie Rae MD  Pager [phone]    Patient is a 89y old  Male who presents with a chief complaint of weakness       SUBJECTIVE / OVERNIGHT EVENTS: resting comfortably; did not engage      MEDICATIONS  (STANDING):  carvedilol 6.25 milliGRAM(s) Oral every 12 hours  chlorhexidine 2% Cloths 1 Application(s) Topical daily  finasteride 5 milliGRAM(s) Oral daily  latanoprost 0.005% Ophthalmic Solution 1 Drop(s) Both EYES at bedtime  multivitamin 1 Tablet(s) Oral daily  oseltamivir 30 milliGRAM(s) Oral every 24 hours  pantoprazole  Injectable 40 milliGRAM(s) IV Push every 12 hours  piperacillin/tazobactam IVPB.. 3.375 Gram(s) IV Intermittent every 8 hours  polyethylene glycol 3350 17 Gram(s) Oral two times a day  tamsulosin 0.4 milliGRAM(s) Oral at bedtime  traZODone 50 milliGRAM(s) Oral at bedtime    MEDICATIONS  (PRN):  acetaminophen     Tablet .. 650 milliGRAM(s) Oral every 6 hours PRN Temp greater or equal to 38C (100.4F), Mild Pain (1 - 3)  aluminum hydroxide/magnesium hydroxide/simethicone Suspension 30 milliLiter(s) Oral every 4 hours PRN Dyspepsia  LORazepam   Injectable 2 milliGRAM(s) IV Push once PRN Seizure  melatonin 3 milliGRAM(s) Oral at bedtime PRN Insomnia  ondansetron Injectable 4 milliGRAM(s) IV Push every 8 hours PRN Nausea and/or Vomiting      CAPILLARY BLOOD GLUCOSE  POCT Blood Glucose.: 160 mg/dL (03 Jan 2025 21:36)  POCT Blood Glucose.: 82 mg/dL (03 Jan 2025 17:17)        PHYSICAL EXAM:  Vital Signs Last 24 Hrs  T(F): 97.3 (04 Jan 2025 09:00), Max: 98 (03 Jan 2025 21:27)  HR: 69 (04 Jan 2025 09:00) (69 - 80)  BP: 102/78 (04 Jan 2025 09:00) (102/78 - 132/89)  RR: 18 (04 Jan 2025 09:00) (18 - 18)  SpO2: 100% (04 Jan 2025 09:00) (96% - 100%)    Parameters below as of 04 Jan 2025 09:00  Patient On (Oxygen Delivery Method): room air        CONSTITUTIONAL: NAD, appears comfortable, thin  EYES: PERRLA; conjunctiva and sclera clear  ENMT: Moist oral mucosa; normal dentition  RESPIRATORY: Normal respiratory effort; grossly b/l AE  CARDIOVASCULAR: Regular rate and rhythm; No lower extremity edema  ABDOMEN: Nontender to palpation, normoactive bowel sounds  MUSCULOSKELETAL: no clubbing or cyanosis of digits; no joint swelling or tenderness to palpation  PSYCH: not engaging  NEUROLOGY: moves all ext   SKIN: L neck cyst, no surrounding erythema    LABS:                        9.8    9.03  )-----------( 234      ( 04 Jan 2025 09:58 )             32.2     01-04    139  |  104  |  14  ----------------------------<  115[H]  4.5   |  25  |  1.09    Ca    9.0      04 Jan 2025 09:58  Phos  3.4     01-04  Mg     2.10     01-04    TPro  6.2  /  Alb  2.7[L]  /  TBili  0.3  /  DBili  x   /  AST  28  /  ALT  25  /  AlkPhos  59  01-04

## 2025-01-04 NOTE — PROGRESS NOTE ADULT - ASSESSMENT
89M Dementia, PAD, DVT - no longer on A/C, BPH s/p chronic carter, Rt facial droop as baseline (No CVA) p/w subacute CVA, acute hypoxic respiratory failure w/ Rt mod pleural effusion w/ LIZZETTE, complicated UTI, acute metabolic encephalopathy, PE, large Rt pleural effusion, mod/sev AS, flu exposure, GI bleed, acute on chronic anemia s/p 2u PRBC on 1/1, L neck cyst

## 2025-01-04 NOTE — PROGRESS NOTE ADULT - PROBLEM SELECTOR PLAN 2
concern for GI bleed given melena.  - likely d/t anticoagulation.  - d/c'd ASA, hep gtt  - on Protonix IV gtt - will inquire when to transition to IV BID by GI.  GI consulted  mild hypotension, improved with PRBC

## 2025-01-05 LAB
ALBUMIN SERPL ELPH-MCNC: 2.8 G/DL — LOW (ref 3.3–5)
ALP SERPL-CCNC: 61 U/L — SIGNIFICANT CHANGE UP (ref 40–120)
ALT FLD-CCNC: 24 U/L — SIGNIFICANT CHANGE UP (ref 4–41)
ANION GAP SERPL CALC-SCNC: 9 MMOL/L — SIGNIFICANT CHANGE UP (ref 7–14)
APTT BLD: 57.4 SEC — HIGH (ref 24.5–35.6)
APTT BLD: 72.7 SEC — HIGH (ref 24.5–35.6)
AST SERPL-CCNC: 14 U/L — SIGNIFICANT CHANGE UP (ref 4–40)
BILIRUB SERPL-MCNC: 0.3 MG/DL — SIGNIFICANT CHANGE UP (ref 0.2–1.2)
BUN SERPL-MCNC: 14 MG/DL — SIGNIFICANT CHANGE UP (ref 7–23)
CALCIUM SERPL-MCNC: 9 MG/DL — SIGNIFICANT CHANGE UP (ref 8.4–10.5)
CHLORIDE SERPL-SCNC: 103 MMOL/L — SIGNIFICANT CHANGE UP (ref 98–107)
CO2 SERPL-SCNC: 27 MMOL/L — SIGNIFICANT CHANGE UP (ref 22–31)
CREAT SERPL-MCNC: 1.09 MG/DL — SIGNIFICANT CHANGE UP (ref 0.5–1.3)
EGFR: 65 ML/MIN/1.73M2 — SIGNIFICANT CHANGE UP
GLUCOSE SERPL-MCNC: 86 MG/DL — SIGNIFICANT CHANGE UP (ref 70–99)
HCT VFR BLD CALC: 32.4 % — LOW (ref 39–50)
HGB BLD-MCNC: 9.8 G/DL — LOW (ref 13–17)
INR BLD: 1.15 RATIO — SIGNIFICANT CHANGE UP (ref 0.85–1.16)
MAGNESIUM SERPL-MCNC: 2 MG/DL — SIGNIFICANT CHANGE UP (ref 1.6–2.6)
MCHC RBC-ENTMCNC: 26.2 PG — LOW (ref 27–34)
MCHC RBC-ENTMCNC: 30.2 G/DL — LOW (ref 32–36)
MCV RBC AUTO: 86.6 FL — SIGNIFICANT CHANGE UP (ref 80–100)
NRBC # BLD: 0 /100 WBCS — SIGNIFICANT CHANGE UP (ref 0–0)
NRBC # FLD: 0 K/UL — SIGNIFICANT CHANGE UP (ref 0–0)
PHOSPHATE SERPL-MCNC: 3.4 MG/DL — SIGNIFICANT CHANGE UP (ref 2.5–4.5)
PLATELET # BLD AUTO: 258 K/UL — SIGNIFICANT CHANGE UP (ref 150–400)
POTASSIUM SERPL-MCNC: 3.4 MMOL/L — LOW (ref 3.5–5.3)
POTASSIUM SERPL-SCNC: 3.4 MMOL/L — LOW (ref 3.5–5.3)
PROT SERPL-MCNC: 6.6 G/DL — SIGNIFICANT CHANGE UP (ref 6–8.3)
PROTHROM AB SERPL-ACNC: 13.3 SEC — SIGNIFICANT CHANGE UP (ref 9.9–13.4)
RBC # BLD: 3.74 M/UL — LOW (ref 4.2–5.8)
RBC # FLD: 18.3 % — HIGH (ref 10.3–14.5)
SODIUM SERPL-SCNC: 139 MMOL/L — SIGNIFICANT CHANGE UP (ref 135–145)
WBC # BLD: 6.69 K/UL — SIGNIFICANT CHANGE UP (ref 3.8–10.5)
WBC # FLD AUTO: 6.69 K/UL — SIGNIFICANT CHANGE UP (ref 3.8–10.5)

## 2025-01-05 PROCEDURE — 99232 SBSQ HOSP IP/OBS MODERATE 35: CPT

## 2025-01-05 RX ORDER — HEPARIN SODIUM 1000 [USP'U]/ML
700 INJECTION, SOLUTION INTRAVENOUS; SUBCUTANEOUS
Qty: 25000 | Refills: 0 | Status: DISCONTINUED | OUTPATIENT
Start: 2025-01-05 | End: 2025-01-05

## 2025-01-05 RX ORDER — HEPARIN SODIUM 1000 [USP'U]/ML
650 INJECTION, SOLUTION INTRAVENOUS; SUBCUTANEOUS
Qty: 25000 | Refills: 0 | Status: DISCONTINUED | OUTPATIENT
Start: 2025-01-05 | End: 2025-01-05

## 2025-01-05 RX ORDER — HEPARIN SODIUM 1000 [USP'U]/ML
650 INJECTION, SOLUTION INTRAVENOUS; SUBCUTANEOUS
Qty: 25000 | Refills: 0 | Status: DISCONTINUED | OUTPATIENT
Start: 2025-01-05 | End: 2025-01-06

## 2025-01-05 RX ADMIN — PIPERACILLIN AND TAZOBACTAM 25 GRAM(S): 3; .375 INJECTION, POWDER, LYOPHILIZED, FOR SOLUTION INTRAVENOUS at 21:55

## 2025-01-05 RX ADMIN — Medication 1 APPLICATION(S): at 05:13

## 2025-01-05 RX ADMIN — LATANOPROST 1 DROP(S): 50 SOLUTION OPHTHALMIC at 21:54

## 2025-01-05 RX ADMIN — Medication 17 GRAM(S): at 17:11

## 2025-01-05 RX ADMIN — HEPARIN SODIUM 6.5 UNIT(S)/HR: 1000 INJECTION, SOLUTION INTRAVENOUS; SUBCUTANEOUS at 22:35

## 2025-01-05 RX ADMIN — HEPARIN SODIUM 6.5 UNIT(S)/HR: 1000 INJECTION, SOLUTION INTRAVENOUS; SUBCUTANEOUS at 17:52

## 2025-01-05 RX ADMIN — Medication 5 MILLIGRAM(S): at 15:04

## 2025-01-05 RX ADMIN — Medication 1 TABLET(S): at 15:04

## 2025-01-05 RX ADMIN — CHLORHEXIDINE GLUCONATE 1 APPLICATION(S): 1.2 RINSE ORAL at 15:08

## 2025-01-05 RX ADMIN — TRAZODONE HYDROCHLORIDE 50 MILLIGRAM(S): 150 TABLET ORAL at 21:55

## 2025-01-05 RX ADMIN — HEPARIN SODIUM 7 UNIT(S)/HR: 1000 INJECTION, SOLUTION INTRAVENOUS; SUBCUTANEOUS at 07:17

## 2025-01-05 RX ADMIN — CARVEDILOL 6.25 MILLIGRAM(S): 25 TABLET, FILM COATED ORAL at 05:15

## 2025-01-05 RX ADMIN — CARVEDILOL 6.25 MILLIGRAM(S): 25 TABLET, FILM COATED ORAL at 17:11

## 2025-01-05 RX ADMIN — PIPERACILLIN AND TAZOBACTAM 25 GRAM(S): 3; .375 INJECTION, POWDER, LYOPHILIZED, FOR SOLUTION INTRAVENOUS at 05:13

## 2025-01-05 RX ADMIN — HEPARIN SODIUM 7 UNIT(S)/HR: 1000 INJECTION, SOLUTION INTRAVENOUS; SUBCUTANEOUS at 03:50

## 2025-01-05 RX ADMIN — HEPARIN SODIUM 6.5 UNIT(S)/HR: 1000 INJECTION, SOLUTION INTRAVENOUS; SUBCUTANEOUS at 19:16

## 2025-01-05 RX ADMIN — Medication 1 APPLICATION(S): at 17:11

## 2025-01-05 RX ADMIN — PANTOPRAZOLE 40 MILLIGRAM(S): 40 TABLET, DELAYED RELEASE ORAL at 17:11

## 2025-01-05 RX ADMIN — PIPERACILLIN AND TAZOBACTAM 25 GRAM(S): 3; .375 INJECTION, POWDER, LYOPHILIZED, FOR SOLUTION INTRAVENOUS at 15:04

## 2025-01-05 RX ADMIN — TAMSULOSIN HYDROCHLORIDE 0.4 MILLIGRAM(S): 0.4 CAPSULE ORAL at 21:55

## 2025-01-05 RX ADMIN — OSELTAMIVIR 30 MILLIGRAM(S): 75 CAPSULE ORAL at 05:16

## 2025-01-05 RX ADMIN — PANTOPRAZOLE 40 MILLIGRAM(S): 40 TABLET, DELAYED RELEASE ORAL at 05:13

## 2025-01-05 RX ADMIN — Medication 17 GRAM(S): at 05:16

## 2025-01-05 NOTE — PROVIDER CONTACT NOTE (CRITICAL VALUE NOTIFICATION) - ASSESSMENT
Patient shows no acute signs of chest pain, sob, or signs of discomfort.
Pt asymptomatic
pt is A&O* 1  pt NSR on tele   pt denies sob, pain and chest pain at this time
Patient A&O x2.
lactate 4.1

## 2025-01-05 NOTE — PROVIDER CONTACT NOTE (CRITICAL VALUE NOTIFICATION) - RECOMMENDATIONS
fluids, repeat, rectal temp
follow heparin nomagram
1 Unit of blood transfusion
Repeat Ptt
stop heparin drip, redraw hgb and type and screen
ACP made aware.
ACP notified

## 2025-01-05 NOTE — PROVIDER CONTACT NOTE (CRITICAL VALUE NOTIFICATION) - PERSON GIVING RESULT:
LAUREN Smiley
jeff hernadez
JOSE L Gill
Perlita Can [facility] hematology
JAMES Delgadillo
SUSHIL Reynolds
DANIE Huang

## 2025-01-05 NOTE — PROGRESS NOTE ADULT - SUBJECTIVE AND OBJECTIVE BOX
[facility] Division of Hospital Medicine  Mackenzie Rae MD  Pager [phone]    Patient is a 89y old  Male who presents with a chief complaint of weakness       SUBJECTIVE / OVERNIGHT EVENTS: pt seen earlier this AM; did not engage      MEDICATIONS  (STANDING):  bacitracin   Ointment 1 Application(s) Topical two times a day  carvedilol 6.25 milliGRAM(s) Oral every 12 hours  chlorhexidine 2% Cloths 1 Application(s) Topical daily  finasteride 5 milliGRAM(s) Oral daily  heparin  Infusion 700 Unit(s)/Hr (7 mL/Hr) IV Continuous <Continuous>  latanoprost 0.005% Ophthalmic Solution 1 Drop(s) Both EYES at bedtime  multivitamin 1 Tablet(s) Oral daily  oseltamivir 30 milliGRAM(s) Oral every 24 hours  pantoprazole  Injectable 40 milliGRAM(s) IV Push every 12 hours  piperacillin/tazobactam IVPB.. 3.375 Gram(s) IV Intermittent every 8 hours  polyethylene glycol 3350 17 Gram(s) Oral two times a day  tamsulosin 0.4 milliGRAM(s) Oral at bedtime  traZODone 50 milliGRAM(s) Oral at bedtime    MEDICATIONS  (PRN):  acetaminophen     Tablet .. 650 milliGRAM(s) Oral every 6 hours PRN Temp greater or equal to 38C (100.4F), Mild Pain (1 - 3)  aluminum hydroxide/magnesium hydroxide/simethicone Suspension 30 milliLiter(s) Oral every 4 hours PRN Dyspepsia  LORazepam   Injectable 2 milliGRAM(s) IV Push once PRN Seizure  melatonin 3 milliGRAM(s) Oral at bedtime PRN Insomnia  ondansetron Injectable 4 milliGRAM(s) IV Push every 8 hours PRN Nausea and/or Vomiting        PHYSICAL EXAM:  Vital Signs Last 24 Hrs  T(F): 97.9 (05 Jan 2025 12:00), Max: 98.1 (04 Jan 2025 21:28)  HR: 75 (05 Jan 2025 12:00) (68 - 78)  BP: 155/85 (05 Jan 2025 12:00) (100/70 - 155/85)  RR: 17 (05 Jan 2025 12:00) (17 - 18)  SpO2: 98% (05 Jan 2025 12:00) (96% - 100%)    Parameters below as of 05 Jan 2025 12:00  Patient On (Oxygen Delivery Method): room air        CONSTITUTIONAL: NAD, appears comfortable, thin, frail  EYES: closed  ENMT: Moist oral mucosa; normal dentition  RESPIRATORY: Normal respiratory effort; grossly b/l AE  CARDIOVASCULAR: Regular rate and rhythm; + murmur; No lower extremity edema  ABDOMEN: Nontender to palpation, normoactive bowel sounds  MUSCULOSKELETAL:  No clubbing or cyanosis of digits; no joint swelling or tenderness to palpation  PSYCH: calm, coop; affect appropriate; restless at times  NEUROLOGY: moves all ext  SKIN: No rashes; no palpable lesions  : carter in place draining clear yellow urine    LABS:                        9.8    6.69  )-----------( 258      ( 05 Jan 2025 07:22 )             32.4     01-05    139  |  103  |  14  ----------------------------<  86  3.4[L]   |  27  |  1.09    Ca    9.0      05 Jan 2025 07:22  Phos  3.4     01-05  Mg     2.00     01-05    TPro  6.6  /  Alb  2.8[L]  /  TBili  0.3  /  DBili  x   /  AST  14  /  ALT  24  /  AlkPhos  61  01-05    PT/INR - ( 05 Jan 2025 02:24 )   PT: 13.3 sec;   INR: 1.15 ratio         PTT - ( 05 Jan 2025 02:24 )  PTT:72.7 sec

## 2025-01-05 NOTE — PROVIDER CONTACT NOTE (CRITICAL VALUE NOTIFICATION) - SITUATION
Patient Hgb 6.9
Pt on pt specific heparin
lactate 4.1
patient has melena over night
Troponin 75
HGB 6.7 HGB 22.5
aptt 172.8

## 2025-01-05 NOTE — PROVIDER CONTACT NOTE (CRITICAL VALUE NOTIFICATION) - ACTION/TREATMENT ORDERED:
Continue to monitor H&H.
ACP notified
heparin drip stopped, hgb and type and screen drawn
heparin nomagram followed
1 Unit of blood ordered
PTT repeated, rate changed
fluids, repeat, rectal temp

## 2025-01-05 NOTE — PROGRESS NOTE ADULT - PROBLEM SELECTOR PLAN 6
Complicated UTI d/t chronic carter  completed abx    #skin infection on neck/cyst  - suspect cyst as family states that it was drained in the past but came back  - would not incise unless ruptures on its own.  - low clinical concern for abscess or infection - patient has been treated with zosyn IV for UTI.

## 2025-01-05 NOTE — CHART NOTE - NSCHARTNOTEFT_GEN_A_CORE
89M dementia, PAD, mod/severe AS, DVT admit with subacute CVA, AHRF, UTI, encephalopathy. Course c/b RUL segmental and MICHELLE/BL lower lobe subsegmental PE and BL popliteal DVT. Inability to tolerate AC iso melena req 2u pRBC on 1/1 with adequate response, holding asa and heparin gtt.     - D/w team, now undergoing rpt trial on heparin gtt. If tolerates plan to transition to doac. If does not tolerate, will reach out to IR for filter placement.     --  Yaakov Malone MD, PGY-4  Vascular and Interventional Radiology   Available on [employer] Teams    - Non-emergent consults: Place IR consult order in [place]  - Emergent issues (pager): [facility] [phone]; [facility] [phone]; [phone]  - Scheduling questions: [facility] [phone]; [facility] [phone]  - Clinic/outpatient booking: [facility] [phone]; [facility] [phone]

## 2025-01-05 NOTE — PROVIDER CONTACT NOTE (CRITICAL VALUE NOTIFICATION) - BACKGROUND
PMH CAD, HTN, BPH
Pt admitted for PE. PMH of HTN, dementia, CAD.
lactate 4.1
Pt admitted for PE. PMH of HTN, dementia, CAD.
Pt admitted for PE. PMH of HTN, dementia, CAD.
pt had been refusing labs previously. Lab drawn by RUBEN Fu
Patient has PMHx of BPH, HTN, and CAD.

## 2025-01-06 ENCOUNTER — NON-APPOINTMENT (OUTPATIENT)
Age: 89
End: 2025-01-06

## 2025-01-06 DIAGNOSIS — F03.90 UNSPECIFIED DEMENTIA W/OUT BEHAVIORAL DISTURBANCE: ICD-10-CM

## 2025-01-06 LAB
ALBUMIN SERPL ELPH-MCNC: 2.6 G/DL — LOW (ref 3.3–5)
ALP SERPL-CCNC: 60 U/L — SIGNIFICANT CHANGE UP (ref 40–120)
ALT FLD-CCNC: 18 U/L — SIGNIFICANT CHANGE UP (ref 4–41)
ANION GAP SERPL CALC-SCNC: 13 MMOL/L — SIGNIFICANT CHANGE UP (ref 7–14)
APTT BLD: 36.8 SEC — HIGH (ref 24.5–35.6)
APTT BLD: 52.6 SEC — HIGH (ref 24.5–35.6)
APTT BLD: 57.8 SEC — HIGH (ref 24.5–35.6)
AST SERPL-CCNC: 20 U/L — SIGNIFICANT CHANGE UP (ref 4–40)
BILIRUB SERPL-MCNC: 0.4 MG/DL — SIGNIFICANT CHANGE UP (ref 0.2–1.2)
BUN SERPL-MCNC: 13 MG/DL — SIGNIFICANT CHANGE UP (ref 7–23)
CALCIUM SERPL-MCNC: 8.8 MG/DL — SIGNIFICANT CHANGE UP (ref 8.4–10.5)
CHLORIDE SERPL-SCNC: 103 MMOL/L — SIGNIFICANT CHANGE UP (ref 98–107)
CO2 SERPL-SCNC: 20 MMOL/L — LOW (ref 22–31)
CREAT SERPL-MCNC: 1.04 MG/DL — SIGNIFICANT CHANGE UP (ref 0.5–1.3)
EGFR: 69 ML/MIN/1.73M2 — SIGNIFICANT CHANGE UP
GLUCOSE SERPL-MCNC: 84 MG/DL — SIGNIFICANT CHANGE UP (ref 70–99)
HCT VFR BLD CALC: 31.5 % — LOW (ref 39–50)
HGB BLD-MCNC: 9.5 G/DL — LOW (ref 13–17)
MAGNESIUM SERPL-MCNC: 2 MG/DL — SIGNIFICANT CHANGE UP (ref 1.6–2.6)
MCHC RBC-ENTMCNC: 26 PG — LOW (ref 27–34)
MCHC RBC-ENTMCNC: 30.2 G/DL — LOW (ref 32–36)
MCV RBC AUTO: 86.3 FL — SIGNIFICANT CHANGE UP (ref 80–100)
NRBC # BLD: 0 /100 WBCS — SIGNIFICANT CHANGE UP (ref 0–0)
NRBC # FLD: 0 K/UL — SIGNIFICANT CHANGE UP (ref 0–0)
PHOSPHATE SERPL-MCNC: 3 MG/DL — SIGNIFICANT CHANGE UP (ref 2.5–4.5)
PLATELET # BLD AUTO: 266 K/UL — SIGNIFICANT CHANGE UP (ref 150–400)
POTASSIUM SERPL-MCNC: 4 MMOL/L — SIGNIFICANT CHANGE UP (ref 3.5–5.3)
POTASSIUM SERPL-SCNC: 4 MMOL/L — SIGNIFICANT CHANGE UP (ref 3.5–5.3)
PROT SERPL-MCNC: 6.6 G/DL — SIGNIFICANT CHANGE UP (ref 6–8.3)
RBC # BLD: 3.65 M/UL — LOW (ref 4.2–5.8)
RBC # FLD: 18 % — HIGH (ref 10.3–14.5)
SODIUM SERPL-SCNC: 136 MMOL/L — SIGNIFICANT CHANGE UP (ref 135–145)
WBC # BLD: 8.21 K/UL — SIGNIFICANT CHANGE UP (ref 3.8–10.5)
WBC # FLD AUTO: 8.21 K/UL — SIGNIFICANT CHANGE UP (ref 3.8–10.5)

## 2025-01-06 PROCEDURE — 99232 SBSQ HOSP IP/OBS MODERATE 35: CPT

## 2025-01-06 RX ORDER — APIXABAN 5 MG/1
2.5 TABLET, FILM COATED ORAL EVERY 12 HOURS
Refills: 0 | Status: DISCONTINUED | OUTPATIENT
Start: 2025-01-06 | End: 2025-01-09

## 2025-01-06 RX ORDER — APIXABAN 5 MG/1
1 TABLET, FILM COATED ORAL
Qty: 60 | Refills: 0
Start: 2025-01-06 | End: 2025-02-04

## 2025-01-06 RX ORDER — HEPARIN SODIUM 1000 [USP'U]/ML
650 INJECTION, SOLUTION INTRAVENOUS; SUBCUTANEOUS
Qty: 25000 | Refills: 0 | Status: DISCONTINUED | OUTPATIENT
Start: 2025-01-06 | End: 2025-01-06

## 2025-01-06 RX ADMIN — LATANOPROST 1 DROP(S): 50 SOLUTION OPHTHALMIC at 22:24

## 2025-01-06 RX ADMIN — Medication 1 TABLET(S): at 12:52

## 2025-01-06 RX ADMIN — CARVEDILOL 6.25 MILLIGRAM(S): 25 TABLET, FILM COATED ORAL at 17:24

## 2025-01-06 RX ADMIN — APIXABAN 2.5 MILLIGRAM(S): 5 TABLET, FILM COATED ORAL at 17:24

## 2025-01-06 RX ADMIN — Medication 1 APPLICATION(S): at 17:24

## 2025-01-06 RX ADMIN — Medication 17 GRAM(S): at 05:53

## 2025-01-06 RX ADMIN — CHLORHEXIDINE GLUCONATE 1 APPLICATION(S): 1.2 RINSE ORAL at 12:52

## 2025-01-06 RX ADMIN — Medication 17 GRAM(S): at 17:24

## 2025-01-06 RX ADMIN — TAMSULOSIN HYDROCHLORIDE 0.4 MILLIGRAM(S): 0.4 CAPSULE ORAL at 22:29

## 2025-01-06 RX ADMIN — PIPERACILLIN AND TAZOBACTAM 25 GRAM(S): 3; .375 INJECTION, POWDER, LYOPHILIZED, FOR SOLUTION INTRAVENOUS at 05:52

## 2025-01-06 RX ADMIN — CARVEDILOL 6.25 MILLIGRAM(S): 25 TABLET, FILM COATED ORAL at 05:52

## 2025-01-06 RX ADMIN — Medication 1 APPLICATION(S): at 05:53

## 2025-01-06 RX ADMIN — Medication 5 MILLIGRAM(S): at 12:52

## 2025-01-06 RX ADMIN — PIPERACILLIN AND TAZOBACTAM 25 GRAM(S): 3; .375 INJECTION, POWDER, LYOPHILIZED, FOR SOLUTION INTRAVENOUS at 13:05

## 2025-01-06 RX ADMIN — OSELTAMIVIR 30 MILLIGRAM(S): 75 CAPSULE ORAL at 05:53

## 2025-01-06 RX ADMIN — PANTOPRAZOLE 40 MILLIGRAM(S): 40 TABLET, DELAYED RELEASE ORAL at 17:25

## 2025-01-06 RX ADMIN — HEPARIN SODIUM 6.5 UNIT(S)/HR: 1000 INJECTION, SOLUTION INTRAVENOUS; SUBCUTANEOUS at 09:06

## 2025-01-06 RX ADMIN — TRAZODONE HYDROCHLORIDE 50 MILLIGRAM(S): 150 TABLET ORAL at 22:28

## 2025-01-06 RX ADMIN — HEPARIN SODIUM 6 UNIT(S)/HR: 1000 INJECTION, SOLUTION INTRAVENOUS; SUBCUTANEOUS at 07:16

## 2025-01-06 RX ADMIN — PANTOPRAZOLE 40 MILLIGRAM(S): 40 TABLET, DELAYED RELEASE ORAL at 05:53

## 2025-01-06 RX ADMIN — HEPARIN SODIUM 6 UNIT(S)/HR: 1000 INJECTION, SOLUTION INTRAVENOUS; SUBCUTANEOUS at 00:53

## 2025-01-06 NOTE — PROGRESS NOTE ADULT - ASSESSMENT
EKG - NSR RBBB unchanged    2D echo show EF 45%, severe pulm HTN, mod to severe AS valve area 1.1 cm2 RV mildly dilated with decreased function    a/p     1) GIB  - melena w/ decreased Hb, held heparin gtt, asa,plavix, s/p PRBC 2 units, s/p PPI gtt  -H/H stable, on IV ppi f/u GI recs    2) B/L PE - IV heparin hold for GIB, monitor h/h, severe pulm HTN and reduced RV function, hemodynamically stable no sign of right heart failure, now resumed heparin gtt    3) CAD s/p CABG - EF mildly reduced , EKG unchanged , BNP > 12K but not in clinical CHF, held asa,plavix for GIB    4) Mod to Sev AS - valve are 1.1cm2 on 2d echo with low gradients, pt not a candidate for intervention sec to dementia conservative management

## 2025-01-06 NOTE — CHART NOTE - NSCHARTNOTESELECT_GEN_ALL_CORE
Event Note
Event Note
EEG Prelim
Event Note
Event Note
GOC/Event Note
IR Followup
Nutrition Services

## 2025-01-06 NOTE — CHART NOTE - NSCHARTNOTEFT_GEN_A_CORE
NUTRITION FOLLOW UP NOTE          Source: Patient A&Ox 1-2   Family [ ]     RN [  ]    Chart [ x ]     Pt seen for nutrition follow up due to moderate malnutrition.    MEDICAL COURSE  Per chart review, patient is a 89M Dementia, PAD, DVT - no longer on A/C, BPH s/p chronic carter, Rt facial droop as baseline (No CVA) p/w subacute CVA, acute hypoxic respiratory failure w/ Rt mod pleural effusion w/ LIZZETTE, complicated UTI, acute metabolic encephalopathy, PE, large Rt pleural effusion, mod/sev AS, flu exposure, GI bleed, acute on chronic anemia s/p 2u PRBC on , L neck cyst      ACTIVE DIET ORDER  Diet, DASH/TLC:   Sodium & Cholesterol Restricted (25 @ 12:41)      NUTRITION COURSE  Patient seen in his room, noted to be very sleepy and falling asleep during conversation. Patient with dementia with A&Ox 1-2 at baseline, unable to fully participate nutrition follow up. Patient noted diet upgraded on regular consistency per medical discretion on ; per  speech and swallow evaluation, patient is tolerating soft and bite consistency and thin liquids. Per RN flow sheet, no reported GI issues such as nausea/vomiting/diarrhea/constipation, on bowel regimen. Patient with fecal incontinence noted on  per RN flow sheet. PO intake varies 25-75% with set-up help. Per previous RD assessment, Patient was recommended with Ensure Plus High Protein 8 oz. 2 x/day (700kcal, 40gm protein), will reinforce. Per care note, patient will be discharged home. RDN to remain available for further nutrition intervention as indicated.     PHYSICAL ASSESSMENT, PER RN FLOW SHEET  EDEMA: No edema per RN flow sheets   SKIN: Incontinence Associated Dermatitis     PERTINENT MEDICATION  MEDICATIONS  (STANDING):  apixaban 2.5 milliGRAM(s) Oral every 12 hours  bacitracin   Ointment 1 Application(s) Topical two times a day  carvedilol 6.25 milliGRAM(s) Oral every 12 hours  chlorhexidine 2% Cloths 1 Application(s) Topical daily  finasteride 5 milliGRAM(s) Oral daily  latanoprost 0.005% Ophthalmic Solution 1 Drop(s) Both EYES at bedtime  multivitamin 1 Tablet(s) Oral daily  oseltamivir 30 milliGRAM(s) Oral every 24 hours  pantoprazole  Injectable 40 milliGRAM(s) IV Push every 12 hours  piperacillin/tazobactam IVPB.. 3.375 Gram(s) IV Intermittent every 8 hours  polyethylene glycol 3350 17 Gram(s) Oral two times a day  tamsulosin 0.4 milliGRAM(s) Oral at bedtime  traZODone 50 milliGRAM(s) Oral at bedtime    MEDICATIONS  (PRN):  acetaminophen     Tablet .. 650 milliGRAM(s) Oral every 6 hours PRN Temp greater or equal to 38C (100.4F), Mild Pain (1 - 3)  aluminum hydroxide/magnesium hydroxide/simethicone Suspension 30 milliLiter(s) Oral every 4 hours PRN Dyspepsia  LORazepam   Injectable 2 milliGRAM(s) IV Push once PRN Seizure  melatonin 3 milliGRAM(s) Oral at bedtime PRN Insomnia  ondansetron Injectable 4 milliGRAM(s) IV Push every 8 hours PRN Nausea and/or Vomiting      PERTINENT LABS      136  |  103  |  13  ----------------------------<  84  4.0   |  20[L]  |  1.04    Ca    8.8      2025 07:10  Phos  3.0       Mg     2.00         TPro  6.6  /  Alb  2.6[L]  /  TBili  0.4  /  DBili  x   /  AST  20  /  ALT  18  /  AlkPhos  60                            9.5    8.21  )-----------( 266      ( 2025 07:10 )             31.5      POCT Blood Glucose.: 160 mg/dL (25 @ 21:36)      ANTHROPOMETRICS  Height (cm): 167.6 (), 167.6 (), 167.6 (10-20), 177.8 ()  Weight (kg): 55.5 (), 55.338 (), 72.6 (10-20), 57.2 ()  BMI (kg/m2): 19.8 (), 19.7 (), 19.7 (), 25.8 (10-20), 18.1 ()  IBW: 142 lbs/64.5kg +/- 10%    Weight Assessment: per RN flowsheet in KG  Daily Weight in k.2 (2025 05:50)  % weight change : -2.3kg /4.1% in 2 weeks, likely in setting of sub-optimal intake    ESTIMATED NEEDS ASSESSMENT  [ x ] recalculated, weight used: 53.2kg  Estimated Energy: 5595-7661 Kcal/kg/day (30-35 kcal/kg)  Estimated Protein: 63.8-74.48 gm/kg/day (1.2-1.4 gm/kg)    PREVIOUS NUTRITION DIAGNOSIS  [ x ] Moderate malnutrition   Nutrition Diagnosis is : [ x ] ongoing  New Nutrition Diagnosis :  [ x ] not applicable     EDUCATION  [ x ] Not applicable 2/2 cognitive deficit    RECOMMENDATION  [ x ] Continue present PO diet order as prescribed with addition of Ensure Plus High Protein 8 oz. 2 x/day  [ x ] Please consider adding appetite stimulant  [ x ] Honor food and fluid preferences as able.  [ x ] Replete electrolytes (Na, K, Phos, Mg) PRN    MONITORING AND EVALUATION   [ x ] Monitor PO intake/tolerance, skin integrity, bowel regimen, and nutrition pertinent labs.  [ x ] Tolerance to diet prescription [ x ] weights [ x ] follow up per protocol

## 2025-01-06 NOTE — PROGRESS NOTE ADULT - SUBJECTIVE AND OBJECTIVE BOX
Brett Prince MD   Interventional Cardiology / Endovascular Specialist   [place] Office : [address] [place] [address] [place] [address]  Tel:    [place] Office : [address] [facility] [place] [address]  Tel: [phone]   Cell : [phone]      Pt seen and examined at bedside, not in distress  	  MEDICATIONS:  carvedilol 6.25 milliGRAM(s) Oral every 12 hours  heparin  Infusion 650 Unit(s)/Hr IV Continuous <Continuous>    oseltamivir 30 milliGRAM(s) Oral every 24 hours  piperacillin/tazobactam IVPB.. 3.375 Gram(s) IV Intermittent every 8 hours      acetaminophen     Tablet .. 650 milliGRAM(s) Oral every 6 hours PRN  LORazepam   Injectable 2 milliGRAM(s) IV Push once PRN  melatonin 3 milliGRAM(s) Oral at bedtime PRN  ondansetron Injectable 4 milliGRAM(s) IV Push every 8 hours PRN  traZODone 50 milliGRAM(s) Oral at bedtime    aluminum hydroxide/magnesium hydroxide/simethicone Suspension 30 milliLiter(s) Oral every 4 hours PRN  pantoprazole  Injectable 40 milliGRAM(s) IV Push every 12 hours  polyethylene glycol 3350 17 Gram(s) Oral two times a day    finasteride 5 milliGRAM(s) Oral daily    bacitracin   Ointment 1 Application(s) Topical two times a day  chlorhexidine 2% Cloths 1 Application(s) Topical daily  latanoprost 0.005% Ophthalmic Solution 1 Drop(s) Both EYES at bedtime  multivitamin 1 Tablet(s) Oral daily  tamsulosin 0.4 milliGRAM(s) Oral at bedtime      PAST MEDICAL/SURGICAL HISTORY  PAST MEDICAL & SURGICAL HISTORY:  CAD (coronary artery disease)      Essential hypertension      HLD (hyperlipidemia)      BPH (benign prostatic hyperplasia)      HTN (hypertension)      Dementia      CAD (coronary artery disease)      S/P CABG (coronary artery bypass graft)      Stenosis of right carotid artery  s/p endartectomy      S/P drug eluting coronary stent placement          SOCIAL HISTORY: Substance Use (street drugs): ( x ) never used  (  ) other:    FAMILY HISTORY:  FH: hypertension (Mother)          PHYSICAL EXAM:  T(C): 36.4 (01-06-25 @ 12:00), Max: 36.5 (01-05-25 @ 21:55)  HR: 78 (01-06-25 @ 12:00) (74 - 79)  BP: 138/70 (01-06-25 @ 12:00) (138/70 - 171/84)  RR: 17 (01-06-25 @ 12:00) (16 - 18)  SpO2: 96% (01-06-25 @ 12:00) (95% - 100%)  Wt(kg): --  I&O's Summary    05 Jan 2025 07:01  -  06 Jan 2025 07:00  --------------------------------------------------------  IN: 400 mL / OUT: 1000 mL / NET: -600 mL          GENERAL: NAD  EYES:   PERRLA   ENMT:   Moist mucous membranes, Good dentition, No lesions  Cardiovascular: Normal S1 S2, No JVD, No murmurs, No edema  Respiratory: Lungs clear to auscultation	  Gastrointestinal:  Soft, Non-tender, + BS	  Extremities: no edema                                    9.5    8.21  )-----------( 266      ( 06 Jan 2025 07:10 )             31.5     01-06    136  |  103  |  13  ----------------------------<  84  4.0   |  20[L]  |  1.04    Ca    8.8      06 Jan 2025 07:10  Phos  3.0     01-06  Mg     2.00     01-06    TPro  6.6  /  Alb  2.6[L]  /  TBili  0.4  /  DBili  x   /  AST  20  /  ALT  18  /  AlkPhos  60  01-06    proBNP:   Lipid Profile:   HgA1c:   TSH:     Consultant(s) Notes Reviewed:  [x ] YES  [ ] NO    Care Discussed with Consultants/Other Providers [ x] YES  [ ] NO    Imaging Personally Reviewed independently:  [x] YES  [ ] NO    All labs, radiologic studies, vitals, orders and medications list reviewed. Patient is seen and examined at bedside. Case discussed with medical team.

## 2025-01-06 NOTE — PROGRESS NOTE ADULT - PROBLEM SELECTOR PLAN 2
concern for GI bleed given melena.  - likely d/t anticoagulation.  - d/c'd ASA  - on Protonix IV gtt - will inquire when to transition to IV BID by GI.  - GI consulted: manage conservatively given high cardiac risk  - heparin trial restarted, continue to monitor hg.

## 2025-01-06 NOTE — PROGRESS NOTE ADULT - SUBJECTIVE AND OBJECTIVE BOX
[facility] Division of [facility] Medicine  Analia Leahy DO  Available via MS Teams  Pager: [phone]    Patient is a 89y old  Male who presents with a chief complaint of weakness (05 Jan 2025 14:08)      SUBJECTIVE / OVERNIGHT EVENTS:    Pt seen and examined this morning. Pt resting comfortably in bed and accompanied by son at bedside. Pt states he does not have any pain and is ok today.     ADDITIONAL REVIEW OF SYSTEMS:  No Fever, chills, chest pain, shortness of breath.     MEDICATIONS  (STANDING):  bacitracin   Ointment 1 Application(s) Topical two times a day  carvedilol 6.25 milliGRAM(s) Oral every 12 hours  chlorhexidine 2% Cloths 1 Application(s) Topical daily  finasteride 5 milliGRAM(s) Oral daily  heparin  Infusion 650 Unit(s)/Hr (6.5 mL/Hr) IV Continuous <Continuous>  latanoprost 0.005% Ophthalmic Solution 1 Drop(s) Both EYES at bedtime  multivitamin 1 Tablet(s) Oral daily  oseltamivir 30 milliGRAM(s) Oral every 24 hours  pantoprazole  Injectable 40 milliGRAM(s) IV Push every 12 hours  piperacillin/tazobactam IVPB.. 3.375 Gram(s) IV Intermittent every 8 hours  polyethylene glycol 3350 17 Gram(s) Oral two times a day  tamsulosin 0.4 milliGRAM(s) Oral at bedtime  traZODone 50 milliGRAM(s) Oral at bedtime    MEDICATIONS  (PRN):  acetaminophen     Tablet .. 650 milliGRAM(s) Oral every 6 hours PRN Temp greater or equal to 38C (100.4F), Mild Pain (1 - 3)  aluminum hydroxide/magnesium hydroxide/simethicone Suspension 30 milliLiter(s) Oral every 4 hours PRN Dyspepsia  LORazepam   Injectable 2 milliGRAM(s) IV Push once PRN Seizure  melatonin 3 milliGRAM(s) Oral at bedtime PRN Insomnia  ondansetron Injectable 4 milliGRAM(s) IV Push every 8 hours PRN Nausea and/or Vomiting      I&O's Summary    05 Jan 2025 07:01  -  06 Jan 2025 07:00  --------------------------------------------------------  IN: 400 mL / OUT: 1000 mL / NET: -600 mL        PHYSICAL EXAM:  Vital Signs Last 24 Hrs  T(C): 36.4 (06 Jan 2025 12:00), Max: 36.5 (05 Jan 2025 21:55)  T(F): 97.6 (06 Jan 2025 12:00), Max: 97.7 (05 Jan 2025 21:55)  HR: 78 (06 Jan 2025 12:00) (74 - 79)  BP: 138/70 (06 Jan 2025 12:00) (138/70 - 171/84)  BP(mean): --  RR: 17 (06 Jan 2025 12:00) (16 - 18)  SpO2: 96% (06 Jan 2025 12:00) (95% - 100%)    Parameters below as of 06 Jan 2025 12:00  Patient On (Oxygen Delivery Method): room air      CONSTITUTIONAL: NAD  EYES: closed  ENMT: Moist oral mucosa  RESPIRATORY: Normal respiratory effort; lungs are clear to auscultation bilaterally  CARDIOVASCULAR: Regular rate and rhythm, normal S1 and S2, no murmur/rub/gallop  ABDOMEN: Nontender to palpation, normoactive bowel sounds  PSYCH: Alert    LABS:                        9.5    8.21  )-----------( 266      ( 06 Jan 2025 07:10 )             31.5     01-06    136  |  103  |  13  ----------------------------<  84  4.0   |  20[L]  |  1.04    Ca    8.8      06 Jan 2025 07:10  Phos  3.0     01-06  Mg     2.00     01-06    TPro  6.6  /  Alb  2.6[L]  /  TBili  0.4  /  DBili  x   /  AST  20  /  ALT  18  /  AlkPhos  60  01-06    PT/INR - ( 05 Jan 2025 02:24 )   PT: 13.3 sec;   INR: 1.15 ratio         PTT - ( 06 Jan 2025 07:10 )  PTT:36.8 sec      Urinalysis Basic - ( 06 Jan 2025 07:10 )    Color: x / Appearance: x / SG: x / pH: x  Gluc: 84 mg/dL / Ketone: x  / Bili: x / Urobili: x   Blood: x / Protein: x / Nitrite: x   Leuk Esterase: x / RBC: x / WBC x   Sq Epi: x / Non Sq Epi: x / Bacteria: x        SARS-CoV-2: NotDetejavier (27 Nov 2024 13:52)

## 2025-01-07 LAB
ALBUMIN SERPL ELPH-MCNC: 2.7 G/DL — LOW (ref 3.3–5)
ALP SERPL-CCNC: 58 U/L — SIGNIFICANT CHANGE UP (ref 40–120)
ALT FLD-CCNC: 14 U/L — SIGNIFICANT CHANGE UP (ref 4–41)
ANION GAP SERPL CALC-SCNC: 12 MMOL/L — SIGNIFICANT CHANGE UP (ref 7–14)
AST SERPL-CCNC: 13 U/L — SIGNIFICANT CHANGE UP (ref 4–40)
BILIRUB SERPL-MCNC: 0.3 MG/DL — SIGNIFICANT CHANGE UP (ref 0.2–1.2)
BUN SERPL-MCNC: 10 MG/DL — SIGNIFICANT CHANGE UP (ref 7–23)
CALCIUM SERPL-MCNC: 8.8 MG/DL — SIGNIFICANT CHANGE UP (ref 8.4–10.5)
CHLORIDE SERPL-SCNC: 106 MMOL/L — SIGNIFICANT CHANGE UP (ref 98–107)
CO2 SERPL-SCNC: 21 MMOL/L — LOW (ref 22–31)
CREAT SERPL-MCNC: 0.99 MG/DL — SIGNIFICANT CHANGE UP (ref 0.5–1.3)
EGFR: 73 ML/MIN/1.73M2 — SIGNIFICANT CHANGE UP
FLUAV AG NPH QL: SIGNIFICANT CHANGE UP
FLUBV AG NPH QL: SIGNIFICANT CHANGE UP
GLUCOSE BLDC GLUCOMTR-MCNC: 89 MG/DL — SIGNIFICANT CHANGE UP (ref 70–99)
GLUCOSE SERPL-MCNC: 84 MG/DL — SIGNIFICANT CHANGE UP (ref 70–99)
HCT VFR BLD CALC: 29.8 % — LOW (ref 39–50)
HGB BLD-MCNC: 8.8 G/DL — LOW (ref 13–17)
MAGNESIUM SERPL-MCNC: 2 MG/DL — SIGNIFICANT CHANGE UP (ref 1.6–2.6)
MCHC RBC-ENTMCNC: 26.1 PG — LOW (ref 27–34)
MCHC RBC-ENTMCNC: 29.5 G/DL — LOW (ref 32–36)
MCV RBC AUTO: 88.4 FL — SIGNIFICANT CHANGE UP (ref 80–100)
NRBC # BLD: 0 /100 WBCS — SIGNIFICANT CHANGE UP (ref 0–0)
NRBC # FLD: 0 K/UL — SIGNIFICANT CHANGE UP (ref 0–0)
PHOSPHATE SERPL-MCNC: 2.8 MG/DL — SIGNIFICANT CHANGE UP (ref 2.5–4.5)
PLATELET # BLD AUTO: 237 K/UL — SIGNIFICANT CHANGE UP (ref 150–400)
POTASSIUM SERPL-MCNC: 3.5 MMOL/L — SIGNIFICANT CHANGE UP (ref 3.5–5.3)
POTASSIUM SERPL-SCNC: 3.5 MMOL/L — SIGNIFICANT CHANGE UP (ref 3.5–5.3)
PROT SERPL-MCNC: 6.5 G/DL — SIGNIFICANT CHANGE UP (ref 6–8.3)
RBC # BLD: 3.37 M/UL — LOW (ref 4.2–5.8)
RBC # FLD: 18.1 % — HIGH (ref 10.3–14.5)
RSV RNA NPH QL NAA+NON-PROBE: SIGNIFICANT CHANGE UP
SARS-COV-2 RNA SPEC QL NAA+PROBE: SIGNIFICANT CHANGE UP
SODIUM SERPL-SCNC: 139 MMOL/L — SIGNIFICANT CHANGE UP (ref 135–145)
WBC # BLD: 5.86 K/UL — SIGNIFICANT CHANGE UP (ref 3.8–10.5)
WBC # FLD AUTO: 5.86 K/UL — SIGNIFICANT CHANGE UP (ref 3.8–10.5)

## 2025-01-07 PROCEDURE — 99232 SBSQ HOSP IP/OBS MODERATE 35: CPT

## 2025-01-07 RX ADMIN — CHLORHEXIDINE GLUCONATE 1 APPLICATION(S): 1.2 RINSE ORAL at 18:47

## 2025-01-07 RX ADMIN — Medication 1 APPLICATION(S): at 18:45

## 2025-01-07 RX ADMIN — TAMSULOSIN HYDROCHLORIDE 0.4 MILLIGRAM(S): 0.4 CAPSULE ORAL at 21:33

## 2025-01-07 RX ADMIN — LATANOPROST 1 DROP(S): 50 SOLUTION OPHTHALMIC at 21:32

## 2025-01-07 RX ADMIN — CARVEDILOL 6.25 MILLIGRAM(S): 25 TABLET, FILM COATED ORAL at 18:45

## 2025-01-07 RX ADMIN — Medication 5 MILLIGRAM(S): at 18:45

## 2025-01-07 RX ADMIN — Medication 3 MILLIGRAM(S): at 21:33

## 2025-01-07 RX ADMIN — PANTOPRAZOLE 40 MILLIGRAM(S): 40 TABLET, DELAYED RELEASE ORAL at 18:46

## 2025-01-07 RX ADMIN — TRAZODONE HYDROCHLORIDE 50 MILLIGRAM(S): 150 TABLET ORAL at 21:33

## 2025-01-07 RX ADMIN — Medication 17 GRAM(S): at 18:46

## 2025-01-07 RX ADMIN — Medication 1 TABLET(S): at 18:45

## 2025-01-07 RX ADMIN — APIXABAN 2.5 MILLIGRAM(S): 5 TABLET, FILM COATED ORAL at 18:45

## 2025-01-07 NOTE — PROGRESS NOTE ADULT - SUBJECTIVE AND OBJECTIVE BOX
Brett Prince MD  Interventional Cardiology / Advance Heart Failure and Cardiac Transplant Specialist  [place] Office : [address] [place] [address] [place] [address]  Tel:    [place] Office : [address] [facility] [place] [address]  Tel: [phone]  Cell : [phone]       Pt seen and examined at bedside, not in distress  	  MEDICATIONS:  apixaban 2.5 milliGRAM(s) Oral every 12 hours  carvedilol 6.25 milliGRAM(s) Oral every 12 hours    oseltamivir 30 milliGRAM(s) Oral every 24 hours      acetaminophen     Tablet .. 650 milliGRAM(s) Oral every 6 hours PRN  LORazepam   Injectable 2 milliGRAM(s) IV Push once PRN  melatonin 3 milliGRAM(s) Oral at bedtime PRN  ondansetron Injectable 4 milliGRAM(s) IV Push every 8 hours PRN  traZODone 50 milliGRAM(s) Oral at bedtime    aluminum hydroxide/magnesium hydroxide/simethicone Suspension 30 milliLiter(s) Oral every 4 hours PRN  pantoprazole  Injectable 40 milliGRAM(s) IV Push every 12 hours  polyethylene glycol 3350 17 Gram(s) Oral two times a day    finasteride 5 milliGRAM(s) Oral daily    bacitracin   Ointment 1 Application(s) Topical two times a day  chlorhexidine 2% Cloths 1 Application(s) Topical daily  latanoprost 0.005% Ophthalmic Solution 1 Drop(s) Both EYES at bedtime  multivitamin 1 Tablet(s) Oral daily  tamsulosin 0.4 milliGRAM(s) Oral at bedtime      PAST MEDICAL/SURGICAL HISTORY  PAST MEDICAL & SURGICAL HISTORY:  CAD (coronary artery disease)      Essential hypertension      HLD (hyperlipidemia)      BPH (benign prostatic hyperplasia)      HTN (hypertension)      Dementia      CAD (coronary artery disease)      S/P CABG (coronary artery bypass graft)      Stenosis of right carotid artery  s/p endartectomy      S/P drug eluting coronary stent placement          SOCIAL HISTORY: Substance Use (street drugs): ( x ) never used  (  ) other:    FAMILY HISTORY:  FH: hypertension (Mother)           PHYSICAL EXAM:  T(C): 36.4 (01-07-25 @ 12:00), Max: 36.7 (01-07-25 @ 04:00)  HR: 77 (01-07-25 @ 12:00) (68 - 82)  BP: 127/53 (01-07-25 @ 12:00) (104/60 - 180/83)  RR: 16 (01-07-25 @ 12:00) (16 - 18)  SpO2: 98% (01-07-25 @ 12:00) (95% - 100%)  Wt(kg): --  I&O's Summary        GENERAL: NAD  EYES:   PERRLA   ENMT:   Moist mucous membranes, Good dentition, No lesions  Cardiovascular: Normal S1 S2, No JVD, No murmurs, No edema  Respiratory: Lungs clear to auscultation	  Gastrointestinal:  Soft, Non-tender, + BS	  Extremities: no edema                                    8.8    5.86  )-----------( 237      ( 07 Jan 2025 07:07 )             29.8     01-07    139  |  106  |  10  ----------------------------<  84  3.5   |  21[L]  |  0.99    Ca    8.8      07 Jan 2025 07:07  Phos  2.8     01-07  Mg     2.00     01-07    TPro  6.5  /  Alb  2.7[L]  /  TBili  0.3  /  DBili  x   /  AST  13  /  ALT  14  /  AlkPhos  58  01-07    proBNP:   Lipid Profile:   HgA1c:   TSH:     Consultant(s) Notes Reviewed:  [x ] YES  [ ] NO    Care Discussed with Consultants/Other Providers [ x] YES  [ ] NO    Imaging Personally Reviewed independently:  [x] YES  [ ] NO    All labs, radiologic studies, vitals, orders and medications list reviewed. Patient is seen and examined at bedside. Case discussed with medical team.

## 2025-01-07 NOTE — PROGRESS NOTE ADULT - ASSESSMENT
EKG - NSR RBBB unchanged    2D echo show EF 45%, severe pulm HTN, mod to severe AS valve area 1.1 cm2 RV mildly dilated with decreased function    a/p     1) GIB  - melena w/ decreased Hb, s/p PRBC 2 units, s/p PPI gtt  - H/H stable, on IV ppi f/u GI recs    2) B/L PE  - IV heparin hold for GIB, monitor h/h, severe pulm HTN and reduced RV function, hemodynamically stable no sign of right heart failure, now heparin gtt switched to eliquis 2.5 bid    3) CAD s/p CABG - EF mildly reduced , EKG unchanged , BNP > 12K but not in clinical CHF, held asa,plavix for GIB    4) Mod to Sev AS - valve are 1.1cm2 on 2d echo with low gradients, pt not a candidate for intervention sec to dementia conservative management

## 2025-01-07 NOTE — PROGRESS NOTE ADULT - PROBLEM SELECTOR PLAN 2
concern for GI bleed given melena.  - likely d/t anticoagulation.  - d/c'd ASA  - on Protonix IV gtt - will inquire when to transition to IV BID by GI.  - GI consulted: manage conservatively given high cardiac risk  - heparin switched to eliquis on 1/6, hg from 9.5 to 8.8 but no signs of bleeding thus far  - continue to monitor

## 2025-01-07 NOTE — PROGRESS NOTE ADULT - SUBJECTIVE AND OBJECTIVE BOX
[facility] Division of Hospital Medicine  Analia Leahy DO  Available via MS Teams  Pager: [phone]    Patient is a 89y old  Male who presents with a chief complaint of weakness (06 Jan 2025 13:57)      SUBJECTIVE / OVERNIGHT EVENTS:    Pt seen and examined this morning. Pt more fatigued appearing today, placed on nc overnight and weaned to room air.    MEDICATIONS  (STANDING):  apixaban 2.5 milliGRAM(s) Oral every 12 hours  bacitracin   Ointment 1 Application(s) Topical two times a day  carvedilol 6.25 milliGRAM(s) Oral every 12 hours  chlorhexidine 2% Cloths 1 Application(s) Topical daily  finasteride 5 milliGRAM(s) Oral daily  latanoprost 0.005% Ophthalmic Solution 1 Drop(s) Both EYES at bedtime  multivitamin 1 Tablet(s) Oral daily  oseltamivir 30 milliGRAM(s) Oral every 24 hours  pantoprazole  Injectable 40 milliGRAM(s) IV Push every 12 hours  polyethylene glycol 3350 17 Gram(s) Oral two times a day  tamsulosin 0.4 milliGRAM(s) Oral at bedtime  traZODone 50 milliGRAM(s) Oral at bedtime    MEDICATIONS  (PRN):  acetaminophen     Tablet .. 650 milliGRAM(s) Oral every 6 hours PRN Temp greater or equal to 38C (100.4F), Mild Pain (1 - 3)  aluminum hydroxide/magnesium hydroxide/simethicone Suspension 30 milliLiter(s) Oral every 4 hours PRN Dyspepsia  LORazepam   Injectable 2 milliGRAM(s) IV Push once PRN Seizure  melatonin 3 milliGRAM(s) Oral at bedtime PRN Insomnia  ondansetron Injectable 4 milliGRAM(s) IV Push every 8 hours PRN Nausea and/or Vomiting      I&O's Summary      PHYSICAL EXAM:  Vital Signs Last 24 Hrs  T(C): 36.4 (07 Jan 2025 12:00), Max: 36.7 (07 Jan 2025 04:00)  T(F): 97.6 (07 Jan 2025 12:00), Max: 98.1 (07 Jan 2025 04:00)  HR: 77 (07 Jan 2025 12:00) (68 - 82)  BP: 127/53 (07 Jan 2025 12:00) (104/60 - 180/83)  BP(mean): --  RR: 16 (07 Jan 2025 12:00) (16 - 18)  SpO2: 98% (07 Jan 2025 12:00) (95% - 100%)    Parameters below as of 07 Jan 2025 12:00  Patient On (Oxygen Delivery Method): room air      CONSTITUTIONAL: NAD  EYES: closed  ENMT: Moist oral mucosa  RESPIRATORY: Normal respiratory effort; lungs are clear to auscultation bilaterally  CARDIOVASCULAR: Regular rate and rhythm, normal S1 and S2, no murmur/rub/gallop  ABDOMEN: Nontender to palpation, normoactive bowel sounds  PSYCH: Alert      LABS:                        8.8    5.86  )-----------( 237      ( 07 Jan 2025 07:07 )             29.8     01-07    139  |  106  |  10  ----------------------------<  84  3.5   |  21[L]  |  0.99    Ca    8.8      07 Jan 2025 07:07  Phos  2.8     01-07  Mg     2.00     01-07    TPro  6.5  /  Alb  2.7[L]  /  TBili  0.3  /  DBili  x   /  AST  13  /  ALT  14  /  AlkPhos  58  01-07    PTT - ( 06 Jan 2025 14:52 )  PTT:52.6 sec      Urinalysis Basic - ( 07 Jan 2025 07:07 )    Color: x / Appearance: x / SG: x / pH: x  Gluc: 84 mg/dL / Ketone: x  / Bili: x / Urobili: x   Blood: x / Protein: x / Nitrite: x   Leuk Esterase: x / RBC: x / WBC x   Sq Epi: x / Non Sq Epi: x / Bacteria: x        SARS-CoV-2: NotDetec (27 Nov 2024 13:52)

## 2025-01-08 ENCOUNTER — TRANSCRIPTION ENCOUNTER (OUTPATIENT)
Age: 89
End: 2025-01-08

## 2025-01-08 LAB
ANION GAP SERPL CALC-SCNC: 9 MMOL/L — SIGNIFICANT CHANGE UP (ref 7–14)
BUN SERPL-MCNC: 13 MG/DL — SIGNIFICANT CHANGE UP (ref 7–23)
CALCIUM SERPL-MCNC: 9.3 MG/DL — SIGNIFICANT CHANGE UP (ref 8.4–10.5)
CHLORIDE SERPL-SCNC: 107 MMOL/L — SIGNIFICANT CHANGE UP (ref 98–107)
CO2 SERPL-SCNC: 25 MMOL/L — SIGNIFICANT CHANGE UP (ref 22–31)
CREAT SERPL-MCNC: 0.99 MG/DL — SIGNIFICANT CHANGE UP (ref 0.5–1.3)
EGFR: 73 ML/MIN/1.73M2 — SIGNIFICANT CHANGE UP
GLUCOSE SERPL-MCNC: 86 MG/DL — SIGNIFICANT CHANGE UP (ref 70–99)
HCT VFR BLD CALC: 33.2 % — LOW (ref 39–50)
HGB BLD-MCNC: 9.7 G/DL — LOW (ref 13–17)
MAGNESIUM SERPL-MCNC: 2 MG/DL — SIGNIFICANT CHANGE UP (ref 1.6–2.6)
MCHC RBC-ENTMCNC: 25.8 PG — LOW (ref 27–34)
MCHC RBC-ENTMCNC: 29.2 G/DL — LOW (ref 32–36)
MCV RBC AUTO: 88.3 FL — SIGNIFICANT CHANGE UP (ref 80–100)
NRBC # BLD: 0 /100 WBCS — SIGNIFICANT CHANGE UP (ref 0–0)
NRBC # FLD: 0 K/UL — SIGNIFICANT CHANGE UP (ref 0–0)
PHOSPHATE SERPL-MCNC: 3.1 MG/DL — SIGNIFICANT CHANGE UP (ref 2.5–4.5)
PLATELET # BLD AUTO: 254 K/UL — SIGNIFICANT CHANGE UP (ref 150–400)
POTASSIUM SERPL-MCNC: 3.4 MMOL/L — LOW (ref 3.5–5.3)
POTASSIUM SERPL-SCNC: 3.4 MMOL/L — LOW (ref 3.5–5.3)
RBC # BLD: 3.76 M/UL — LOW (ref 4.2–5.8)
RBC # FLD: 18.3 % — HIGH (ref 10.3–14.5)
SODIUM SERPL-SCNC: 141 MMOL/L — SIGNIFICANT CHANGE UP (ref 135–145)
WBC # BLD: 4.84 K/UL — SIGNIFICANT CHANGE UP (ref 3.8–10.5)
WBC # FLD AUTO: 4.84 K/UL — SIGNIFICANT CHANGE UP (ref 3.8–10.5)

## 2025-01-08 PROCEDURE — 99233 SBSQ HOSP IP/OBS HIGH 50: CPT

## 2025-01-08 RX ORDER — ASPIRIN 325 MG
1 TABLET ORAL
Refills: 0 | DISCHARGE

## 2025-01-08 RX ORDER — PANTOPRAZOLE 40 MG/1
1 TABLET, DELAYED RELEASE ORAL
Qty: 60 | Refills: 0
Start: 2025-01-08 | End: 2025-02-06

## 2025-01-08 RX ORDER — OLANZAPINE 15 MG/1
2.5 TABLET ORAL ONCE
Refills: 0 | Status: COMPLETED | OUTPATIENT
Start: 2025-01-08 | End: 2025-01-08

## 2025-01-08 RX ORDER — OXYCODONE HYDROCHLORIDE 30 MG/1
1 TABLET ORAL
Refills: 0 | DISCHARGE

## 2025-01-08 RX ORDER — PANTOPRAZOLE 40 MG/1
40 TABLET, DELAYED RELEASE ORAL EVERY 12 HOURS
Refills: 0 | Status: DISCONTINUED | OUTPATIENT
Start: 2025-01-08 | End: 2025-01-09

## 2025-01-08 RX ORDER — BACITRACIN 500 UNIT/G
1 OINTMENT (GRAM) TOPICAL
Qty: 1 | Refills: 0
Start: 2025-01-08 | End: 2025-01-12

## 2025-01-08 RX ORDER — GINKGO BILOBA 40 MG
1 CAPSULE ORAL
Qty: 0 | Refills: 0 | DISCHARGE
Start: 2025-01-08

## 2025-01-08 RX ADMIN — Medication 17 GRAM(S): at 16:38

## 2025-01-08 RX ADMIN — PANTOPRAZOLE 40 MILLIGRAM(S): 40 TABLET, DELAYED RELEASE ORAL at 16:38

## 2025-01-08 RX ADMIN — TRAZODONE HYDROCHLORIDE 50 MILLIGRAM(S): 150 TABLET ORAL at 22:03

## 2025-01-08 RX ADMIN — CHLORHEXIDINE GLUCONATE 1 APPLICATION(S): 1.2 RINSE ORAL at 16:46

## 2025-01-08 RX ADMIN — Medication 1 APPLICATION(S): at 05:53

## 2025-01-08 RX ADMIN — OSELTAMIVIR 30 MILLIGRAM(S): 75 CAPSULE ORAL at 11:17

## 2025-01-08 RX ADMIN — CARVEDILOL 6.25 MILLIGRAM(S): 25 TABLET, FILM COATED ORAL at 16:42

## 2025-01-08 RX ADMIN — Medication 1 APPLICATION(S): at 16:41

## 2025-01-08 RX ADMIN — OLANZAPINE 2.5 MILLIGRAM(S): 15 TABLET ORAL at 04:04

## 2025-01-08 RX ADMIN — Medication 5 MILLIGRAM(S): at 11:18

## 2025-01-08 RX ADMIN — LATANOPROST 1 DROP(S): 50 SOLUTION OPHTHALMIC at 22:02

## 2025-01-08 RX ADMIN — Medication 1 TABLET(S): at 11:17

## 2025-01-08 RX ADMIN — APIXABAN 2.5 MILLIGRAM(S): 5 TABLET, FILM COATED ORAL at 16:38

## 2025-01-08 RX ADMIN — TAMSULOSIN HYDROCHLORIDE 0.4 MILLIGRAM(S): 0.4 CAPSULE ORAL at 22:03

## 2025-01-08 NOTE — DISCHARGE NOTE PROVIDER - NSDCCPTREATMENT_GEN_ALL_CORE_FT
PRINCIPAL PROCEDURE  Procedure: MRI  Findings and Treatment: 1.  Questionable punctate focus of diffusion restriction on DTI sequence   in the high right frontal lobe. This is not definitively seen on ADC   diffusion sequence. Finding could be artifactual or represent a punctate   subacute infarct. Recommend clinical correlation  2.  Chronic small vessel disease.        SECONDARY PROCEDURE  Procedure: US Doppler vein of extremity lower bilateral  Findings and Treatment: RIGHT:  Normal compressibility of the RIGHT common femoral, femoral veins.  Doppler examination shows normal spontaneousand phasic flow.  Right popliteal vein is not compressible.  No RIGHT calf vein thrombosis is detected.  LEFT:  Normal compressibility of the LEFT common femoral, femoral veins.  Doppler examination shows normal spontaneous and phasic flow.  Left popliteal vein is noncompressible  No LEFT calf vein thrombosis is detected.  IMPRESSION:  Bilateral popliteal vein thrombosis.      Procedure: CTA chest w/w/o contrast  Findings and Treatment: Redemonstrated segmental pulmonary embolism in right upper lobe and   subsegmental pulmonary thromboemboli in left upper lobe and both lower   lobes. No evidence of acute right heart strain.  Large right pleural effusion with associated partial passive atelectasis   right lower lobe

## 2025-01-08 NOTE — DISCHARGE NOTE PROVIDER - HOSPITAL COURSE
HPI:  89 y.o man with a PMHx significant for pmhx of dementia, CAD s/p CABG and stents, prior DVT, and BPH with chronic indwelling Carter catheter, PAD, right facial droop at baseline (no hx of CVA), presents to ED for right facial droop worsening from baseline and slurred speech x 2 days. As per chart review, pt brought in by Son for episodes of body 'freezing' and staring into space. He had an episode yesterday morning and noticed worsening right sided facial droop + slowed speech. No recent fevers, sick contacts, lives at home with an aid.     ED vitals: BP 170s- 190s systolic  Hypoxic and placed on NC 94%  CTA head and neck with no large vessel occlusion but CT showing b/l partially visualized PE, started on heparin drip  Pt found to have an elevated lactate of 4.1--> 2.1, treated with CTX for UTI   (26 Dec 2024 22:24)    Hospital Course:    # Acute cerebrovascular accident (CVA).   - subacute CVA  - EEG performed - no evidence of SZ D/O  - MR Brain reviewed, TTE with bubble reviewed  - Dysphagia screening passed  - Stroke education provided by nursing  - Continue Statin    # Metabolic encephalopathy  - multifactorial in the setting of baseline dementia  - Acute CVA vs UTI  - patient has been treated with zosyn IV for UTI  - recommend re-orientation for behavior disturbance    # GI bleed.   - concern for GI bleed given melena.  - likely d/t anticoagulation.  - d/c'd ASA and eliquis initially  - PPI BID  - GI consulted: manage conservatively given high cardiac risk  - heparin trial initiated and then switched to eliquis on 1/6, hg from 9.5 to 8.8 but no signs of bleeding thus far    # Anemia due to acute blood loss.   - s/p 2u PRBC on 1/1    # Pulmonary embolism.   - Confirmed PE on CTPA  - titrate down on O2 as needed, no resp distress  - doppler + for Bilateral popliteal vein thrombosis.  - transitioned to doac on 1/6- tolerated    # Acute UTI.   - Complicated UTI d/t chronic carter  - completed abx    # skin infection on neck/cyst  - suspect cyst as family states that it was drained in the past but came back  - would not incise unless ruptures on its own.  - low clinical concern for abscess or infection - patient has been treated with zosyn IV for UTI.    # Aortic stenosis.   - Noted AS  - Cardiology consulted - not candidate for TAVR.    # LIZZETTE (acute kidney injury).  RESOLVED.  - likely pre-renal    # Dementia.   - Continue Trazodone for mood control.    # Essential hypertension.    - Continue Coreg.    # BPH (benign prostatic hyperplasia).   - Continue Proscar and Flomax.    # Pleural effusion.   - family wishes to defer intervention for pleural effusion at this time given situation with pulmonary embolism and acute CVA.  - will monitor pulmonary status  - aware of potential of malignancy as possibility.

## 2025-01-08 NOTE — DISCHARGE NOTE PROVIDER - NSDCMRMEDTOKEN_GEN_ALL_CORE_FT
apixaban 2.5 mg oral tablet: 1 tab(s) orally every 12 hours  aspirin 81 mg oral tablet: 1 tab(s) orally once a day  carvedilol 6.25 mg oral tablet: 1 tab(s) orally 2 times a day  latanoprost 0.005% ophthalmic solution: 1 drop(s) in each eye once a day (at bedtime)  Multiple Vitamins oral tablet: 1 tab(s) orally once a day  oxyCODONE 5 mg oral tablet: 1 tab(s) orally every 6 hours as needed for  severe pain  polyethylene glycol 3350 oral powder for reconstitution: 17 gram(s) orally 2 times a day  Proscar 5 mg oral tablet: 1 tab(s) orally once a day  senna leaf extract oral tablet: 2 tab(s) orally once a day (at bedtime)  tamsulosin 0.4 mg oral capsule: 1 cap(s) orally once a day (at bedtime) BPH  traZODone 50 mg oral tablet: 1 tab(s) orally once a day (at bedtime)   apixaban 2.5 mg oral tablet: 1 tab(s) orally every 12 hours  bacitracin 500 units/g topical ointment: Apply topically to affected area 2 times a day 1 Apply topically to affected area (nares) 2 times a day  carvedilol 6.25 mg oral tablet: 1 tab(s) orally 2 times a day  latanoprost 0.005% ophthalmic solution: 1 drop(s) in each eye once a day (at bedtime)  melatonin 3 mg oral tablet: 1 tab(s) orally once a day (at bedtime) As needed Insomnia  Multiple Vitamins oral tablet: 1 tab(s) orally once a day  pantoprazole 40 mg oral delayed release tablet: 1 tab(s) orally every 12 hours  polyethylene glycol 3350 oral powder for reconstitution: 17 gram(s) orally 2 times a day  Proscar 5 mg oral tablet: 1 tab(s) orally once a day  senna leaf extract oral tablet: 2 tab(s) orally once a day (at bedtime)  tamsulosin 0.4 mg oral capsule: 1 cap(s) orally once a day (at bedtime) BPH  traZODone 50 mg oral tablet: 1 tab(s) orally once a day (at bedtime)

## 2025-01-08 NOTE — PROVIDER CONTACT NOTE (OTHER) - ASSESSMENT
Pt is a&ox1, hemodynamically stable, no complaints of chest pain or SOB. patient had 3 beats of Vtach and is asymptomatic.
Pt refusing labs and IV renewal. Pt was educated on the need for updated labs. Pt explained they did not want to be poked again.
Patient lethargic, unable to complete neuro assessment. Has breif awakening to yelling/gentle shaking but does not remain awake to complete neuro assessment.
AOx1, NSR on cardiac monitor, 4L NC.
No complaints of chest pain or shortness of breath. Patient asymptomatic, resting in bed
pt is A&O* 1  pt NSR on tele   pt denies sob, pain and chest pain at this time
AOx0/1, NSR on cardiac monitor, 4L NC.  Asymptomatic.
No complaints of chest pain or shortness of breath. Patient asymptomatic, resting in bed
AOx0/1, NSR on cardiac monitor, 4L NC.
No changes to mental status. Patient is AXOx3, no changes to mental status.
/109, patient lethargic. HR 80. o2 96% 2L nasal cannula temp 98 oral
AOx0/1, NSR on cardiac monitor, 4L NC.

## 2025-01-08 NOTE — DISCHARGE NOTE NURSING/CASE MANAGEMENT/SOCIAL WORK - FINANCIAL ASSISTANCE
[facility] provides services at a reduced cost to those who are determined to be eligible through [facility]’s financial assistance program. Information regarding [facility]’s financial assistance program can be found by going to [email or web address] or by calling [phone].

## 2025-01-08 NOTE — DISCHARGE NOTE PROVIDER - NSDCHOSPICE_GEN_A_CORE
Patient is hard of hearing but alert and oriented x3, able to participate in discussion. He resides with daughter Mireille but is independent in most ADLs and still enjoys traveling by himself at times. He takes pride in his independence and most values a return to independence upon discharge from hospital. Pt strongly expressed his desire to be a DNR and DNI while on the floor. In case a surgical (or other) intervention is needed, he would temporarily rescind the DNR/I order. He would like to continue pursue all further medical treatment, including surgery and other invasive interventions. Patient is hard of hearing but alert and oriented x3, able to participate in discussion. He resides with daughter Mireille but was independent in most ADLs and still enjoyed traveling by himself at times. He takes pride in his independence and most values a return to independence upon discharge from hospital. Pt strongly expressed his desire to be a DNR and DNI.    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 Pt's daughters both strongly feel that if he were to need surgery, chemo, and radiation, he would be unlikely to return to his prior level of functioning and that he would prefer to die peacefully at home. Pt is aware of the diagnosis, and would like to discuss with his daughters in detail privately. At this time the pt and family would like hospice evaluation. Their priorities remain for patient to remain as independent as possible for the remainder of his life, and hope that hospice will provide end-of-life symptom relief if/when his gall bladder perforation causes worsening symptoms, and to allow him a natural and peaceful death.     Will make referral to hospice care network. MOLST form completed and signed, and placed in chart. No

## 2025-01-08 NOTE — PROGRESS NOTE ADULT - NSPROGADDITIONALINFOA_GEN_ALL_CORE
I reviewed the overnight course of events on the unit, re-confirming the patient history. I discussed the care with the patient and their family. The plan of care was discussed with the ACP team and modifications were made to the notation where appropriate. Differential diagnosis and plan of care discussed with patient after the evaluation. Advanced care planning was discussed with patient and family.  Advanced care planning forms were reviewed and discussed.  Risks, benefits and alternatives of cardiac procedures were discussed in detail and all questions were answered. 35 minutes spent on total encounter of which more than fifty percent of the encounter was spent counseling and/or coordinating care by the attending physician.
I reviewed the overnight course of events on the unit, re-confirming the patient history. I discussed the care with the patient and their family. The plan of care was discussed with the ACP team and modifications were made to the notation where appropriate. Differential diagnosis and plan of care discussed with patient after the evaluation. Advanced care planning was discussed with patient and family.  Advanced care planning forms were reviewed and discussed.  Risks, benefits and alternatives of cardiac procedures were discussed in detail and all questions were answered. 35 minutes spent on total encounter of which more than fifty percent of the encounter was spent counseling and/or coordinating care by the attending physician.
Discussed with Rosana (daughter) by the bedside on 12/31 for 15 minutes  Answered all the questions.
updated daughter Rosana; plan for hep drip, pt specific with no bolus to see if pt tolerates as per GI recs since hg has been stable and no evidence of bleeding give b/l PE and b/l DVT; if any evidence of drop in hg, melena, hypotension will stop; d/w daughter doppler results and will advance diet; d/w if pt does not alyse hep drip, will likely need filter;
Discussed with daughter Rosana on the phone on 1/2 for 10 minutes  Answered all the questions.
Discussed with daughter Rosana and son on the phone on  1/3 for 10 minutes  Answered all the questions.

## 2025-01-08 NOTE — DISCHARGE NOTE NURSING/CASE MANAGEMENT/SOCIAL WORK - PATIENT PORTAL LINK FT
You can access the FollowMyHealth Patient Portal offered by [facility] by registering at the following website: [email or web address]. By joining [facility]’s FollowMyHealth portal, you will also be able to view your health information using other applications (apps) compatible with our system.

## 2025-01-08 NOTE — PROGRESS NOTE ADULT - PROBLEM SELECTOR PLAN 2
concern for GI bleed given melena.  - likely d/t anticoagulation.  - d/c'd ASA: discussed with daughter bedside to reevaluate starting after visit with PCP tiki.  - switched to PPI oral BID  - GI consulted: manage conservatively given high cardiac risk  - heparin switched to eliquis on 1/6, hg from 9.5 to 8.8 but no signs of bleeding thus far, repeat hg appropriate

## 2025-01-08 NOTE — PROVIDER CONTACT NOTE (OTHER) - ACTION/TREATMENT ORDERED:
ACP made aware.
ACP notified. No new orders at this time
ACP made aware.
ACP notified.
ACP aware. Reassess and attempt in 2-3 hours.
1 Unit PRBC rate increased to run over 1.5 hours, additional Unit ordered to run over 2 hours
ACP Ailyn Fu made aware
ACP made aware no change is plan of care
ACP made aware.
provider notified
ACP notified. No new orders at this time
ACP made aware.  12PM Metoprolol dose given.

## 2025-01-08 NOTE — DISCHARGE NOTE PROVIDER - DETAILS OF MALNUTRITION DIAGNOSIS/DIAGNOSES
This patient has been assessed with a concern for Malnutrition and was treated during this hospitalization for the following Nutrition diagnosis/diagnoses:     -  12/28/2024: Severe protein-calorie malnutrition

## 2025-01-08 NOTE — PROGRESS NOTE ADULT - SUBJECTIVE AND OBJECTIVE BOX
Brett Prince MD  Interventional Cardiology / Advance Heart Failure and Cardiac Transplant Specialist  [place] Office : [phone] [place] [address] [place] [address]  Tel:    [place] Office : [address] [facility] [place] [address]  Tel: [phone]  Cell : [phone]       Pt seen and examined at bedside, not in distress  	  MEDICATIONS:  apixaban 2.5 milliGRAM(s) Oral every 12 hours  carvedilol 6.25 milliGRAM(s) Oral every 12 hours    oseltamivir 30 milliGRAM(s) Oral every 24 hours      acetaminophen     Tablet .. 650 milliGRAM(s) Oral every 6 hours PRN  LORazepam   Injectable 2 milliGRAM(s) IV Push once PRN  melatonin 3 milliGRAM(s) Oral at bedtime PRN  ondansetron Injectable 4 milliGRAM(s) IV Push every 8 hours PRN  traZODone 50 milliGRAM(s) Oral at bedtime    aluminum hydroxide/magnesium hydroxide/simethicone Suspension 30 milliLiter(s) Oral every 4 hours PRN  pantoprazole    Tablet 40 milliGRAM(s) Oral every 12 hours  polyethylene glycol 3350 17 Gram(s) Oral two times a day    finasteride 5 milliGRAM(s) Oral daily    bacitracin   Ointment 1 Application(s) Topical two times a day  chlorhexidine 2% Cloths 1 Application(s) Topical daily  latanoprost 0.005% Ophthalmic Solution 1 Drop(s) Both EYES at bedtime  multivitamin 1 Tablet(s) Oral daily  tamsulosin 0.4 milliGRAM(s) Oral at bedtime      PAST MEDICAL/SURGICAL HISTORY  PAST MEDICAL & SURGICAL HISTORY:  CAD (coronary artery disease)      Essential hypertension      HLD (hyperlipidemia)      BPH (benign prostatic hyperplasia)      HTN (hypertension)      Dementia      CAD (coronary artery disease)      S/P CABG (coronary artery bypass graft)      Stenosis of right carotid artery  s/p endartectomy      S/P drug eluting coronary stent placement          SOCIAL HISTORY: Substance Use (street drugs): ( x ) never used  (  ) other:    FAMILY HISTORY:  FH: hypertension (Mother)           PHYSICAL EXAM:  T(C): 37 (01-08-25 @ 12:00), Max: 37 (01-08-25 @ 12:00)  HR: 72 (01-08-25 @ 12:00) (71 - 91)  BP: 142/64 (01-08-25 @ 12:00) (117/74 - 169/78)  RR: 18 (01-08-25 @ 12:00) (16 - 19)  SpO2: 100% (01-08-25 @ 12:00) (97% - 100%)  Wt(kg): --  I&O's Summary    07 Jan 2025 07:01  -  08 Jan 2025 07:00  --------------------------------------------------------  IN: 0 mL / OUT: 260 mL / NET: -260 mL          GENERAL: NAD  EYES:   PERRLA   ENMT:   Moist mucous membranes, Good dentition, No lesions  Cardiovascular: Normal S1 S2, No JVD, No murmurs, No edema  Respiratory: Lungs clear to auscultation	  Gastrointestinal:  Soft, Non-tender, + BS	  Extremities: no edema                                    9.7    4.84  )-----------( 254      ( 08 Jan 2025 10:33 )             33.2     01-08    141  |  107  |  13  ----------------------------<  86  3.4[L]   |  25  |  0.99    Ca    9.3      08 Jan 2025 10:33  Phos  3.1     01-08  Mg     2.00     01-08    TPro  6.5  /  Alb  2.7[L]  /  TBili  0.3  /  DBili  x   /  AST  13  /  ALT  14  /  AlkPhos  58  01-07    proBNP:   Lipid Profile:   HgA1c:   TSH:     Consultant(s) Notes Reviewed:  [x ] YES  [ ] NO    Care Discussed with Consultants/Other Providers [ x] YES  [ ] NO    Imaging Personally Reviewed independently:  [x] YES  [ ] NO    All labs, radiologic studies, vitals, orders and medications list reviewed. Patient is seen and examined at bedside. Case discussed with medical team.

## 2025-01-08 NOTE — PROGRESS NOTE ADULT - TIME BILLING
review of laboratory data, radiology results, consultants' recommendations, documentation in [place], discussion with patient/ACP and interdisciplinary staff (such as , social workers, etc). Interventions were performed as documented above.
- Ordering, reviewing, and interpreting labs, testing, and imaging.  - Independently obtaining a review of systems and performing a physical exam  - Reviewing consultant documentation/recommendations in addition to discussing plan of care with consultants.  - Counselling and educating patient and family regarding interpretation of aforementioned items and plan of care.
- Ordering, reviewing, and interpreting labs, testing, and imaging.  - Independently obtaining a review of systems and performing a physical exam  - Reviewing consultant documentation/recommendations in addition to discussing plan of care with consultants.  - Counselling and educating patient and family regarding interpretation of aforementioned items and plan of care.
Preparing to see the patient including review of tests and other providers' notes, confirming history with family member, performing medical examination and evaluation, counseling and educating the patient/family/caregiver, ordering medications, tests and procedures, communicating with other health care professionals, documenting clinical information in the EMR, independently interpreting results and communicating results to the patient/family/caregiven, care coordination.
- Ordering, reviewing, and interpreting labs, testing, and imaging.  - Independently obtaining a review of systems and performing a physical exam  - Reviewing consultant documentation/recommendations in addition to discussing plan of care with consultants.  - Counselling and educating patient and family regarding interpretation of aforementioned items and plan of care.
Preparing to see the patient including review of tests and other providers' notes, confirming history with family member, performing medical examination and evaluation, counseling and educating the patient/family/caregiver, ordering medications, tests and procedures, communicating with other health care professionals, documenting clinical information in the EMR, independently interpreting results and communicating results to the patient/family/caregiven, care coordination.
- Ordering, reviewing, and interpreting labs, testing, and imaging.  - Independently obtaining a review of systems and performing a physical exam  - Reviewing consultant documentation/recommendations in addition to discussing plan of care with consultants.  - Counselling and educating patient and family regarding interpretation of aforementioned items and plan of care.
- Ordering, reviewing, and interpreting labs, testing, and imaging.  - Independently obtaining a review of systems and performing a physical exam  - Reviewing consultant documentation/recommendations in addition to discussing plan of care with consultants.  - Counselling and educating patient and family regarding interpretation of aforementioned items and plan of care.

## 2025-01-08 NOTE — PROVIDER CONTACT NOTE (OTHER) - DATE AND TIME:
30-Dec-2024 11:33
01-Jan-2025 10:05
26-Dec-2024 23:12
27-Dec-2024 15:40
29-Dec-2024 14:43
07-Jan-2025 14:30
27-Dec-2024 07:41
31-Dec-2024 05:07
08-Jan-2025 05:53
27-Dec-2024 11:33
27-Dec-2024 04:30
28-Dec-2024 06:27

## 2025-01-08 NOTE — PHARMACOTHERAPY INTERVENTION NOTE - COMMENTS
Discharge medications reviewed with the patient's daughter Rosana. Current medication schedule was discussed in detail including: medication name, indication, dose, administration times, treatment duration, side effects, and special instructions. Educated on apixaban including the purpose and administration. Discussed adverse effects in detail and when to seek medical attention (blood in stool, vomit, etc.). Informed patient's daughter to notify all providers he is on this and before any planned procedures. Advised to avoid NSAIDs to limit risk of bleeding. Questions and concerns were answered and addressed. Patient's daughter demonstrated understanding. Patient's daughter provided with educational medication cards.    New medications: apixaban, pantoprazole    Marija Brown, PharmD, [facility]  Clinical Pharmacy Specialist  [id number]

## 2025-01-08 NOTE — DISCHARGE NOTE PROVIDER - ATTENDING DISCHARGE PHYSICAL EXAMINATION:
Vital Signs Last 24 Hrs  T(C): 36.3 (08 Jan 2025 04:00), Max: 36.6 (07 Jan 2025 20:00)  T(F): 97.4 (08 Jan 2025 04:00), Max: 97.9 (08 Jan 2025 00:00)  HR: 71 (08 Jan 2025 04:00) (71 - 91)  BP: 155/86 (08 Jan 2025 04:00) (117/74 - 165/86)  BP(mean): --  RR: 18 (08 Jan 2025 04:00) (16 - 18)  SpO2: 97% (08 Jan 2025 04:00) (97% - 100%)    Parameters below as of 08 Jan 2025 04:00  Patient On (Oxygen Delivery Method): room air        CONSTITUTIONAL: NAD, well-groomed  EYES: closed  ENMT: Moist oral mucosa  RESPIRATORY: Normal respiratory effort; lungs are clear to auscultation bilaterally  CARDIOVASCULAR: Regular rate and rhythm, normal S1 and S2, no murmur/rub/gallop  ABDOMEN: Nontender to palpation, normoactive bowel sounds  PSYCH: Alert  SKIN: No rashes; no palpable lesions

## 2025-01-08 NOTE — PROGRESS NOTE ADULT - SUBJECTIVE AND OBJECTIVE BOX
[facility] Division of Hospital Medicine  Tdhoangbishnugeorgie DO Armond  Available via MS Teams  Pager: [phone]    Patient is a 89y old  Male who presents with a chief complaint of weakness (08 Jan 2025 11:55)      SUBJECTIVE / OVERNIGHT EVENTS:    Pt seen and examined this morning. Pt denies any new complaints and was resting comfortably in bed.     ADDITIONAL REVIEW OF SYSTEMS:  No Fever, chills, chest pain, shortness of breath.     MEDICATIONS  (STANDING):  apixaban 2.5 milliGRAM(s) Oral every 12 hours  bacitracin   Ointment 1 Application(s) Topical two times a day  carvedilol 6.25 milliGRAM(s) Oral every 12 hours  chlorhexidine 2% Cloths 1 Application(s) Topical daily  finasteride 5 milliGRAM(s) Oral daily  latanoprost 0.005% Ophthalmic Solution 1 Drop(s) Both EYES at bedtime  multivitamin 1 Tablet(s) Oral daily  oseltamivir 30 milliGRAM(s) Oral every 24 hours  pantoprazole    Tablet 40 milliGRAM(s) Oral every 12 hours  polyethylene glycol 3350 17 Gram(s) Oral two times a day  tamsulosin 0.4 milliGRAM(s) Oral at bedtime  traZODone 50 milliGRAM(s) Oral at bedtime    MEDICATIONS  (PRN):  acetaminophen     Tablet .. 650 milliGRAM(s) Oral every 6 hours PRN Temp greater or equal to 38C (100.4F), Mild Pain (1 - 3)  aluminum hydroxide/magnesium hydroxide/simethicone Suspension 30 milliLiter(s) Oral every 4 hours PRN Dyspepsia  LORazepam   Injectable 2 milliGRAM(s) IV Push once PRN Seizure  melatonin 3 milliGRAM(s) Oral at bedtime PRN Insomnia  ondansetron Injectable 4 milliGRAM(s) IV Push every 8 hours PRN Nausea and/or Vomiting      I&O's Summary    07 Jan 2025 07:01  -  08 Jan 2025 07:00  --------------------------------------------------------  IN: 0 mL / OUT: 260 mL / NET: -260 mL        PHYSICAL EXAM:  Vital Signs Last 24 Hrs  T(C): 37 (08 Jan 2025 12:00), Max: 37 (08 Jan 2025 12:00)  T(F): 98.6 (08 Jan 2025 12:00), Max: 98.6 (08 Jan 2025 12:00)  HR: 72 (08 Jan 2025 12:00) (71 - 91)  BP: 142/64 (08 Jan 2025 12:00) (117/74 - 169/78)  BP(mean): --  RR: 18 (08 Jan 2025 12:00) (18 - 19)  SpO2: 100% (08 Jan 2025 12:00) (97% - 100%)    Parameters below as of 08 Jan 2025 12:00  Patient On (Oxygen Delivery Method): room air      CONSTITUTIONAL: NAD  EYES: closed  ENMT: Moist oral mucosa  RESPIRATORY: Normal respiratory effort; lungs are clear to auscultation bilaterally  CARDIOVASCULAR: Regular rate and rhythm, normal S1 and S2, no murmur/rub/gallop  ABDOMEN: Nontender to palpation, normoactive bowel sounds  PSYCH: Alert    LABS:                        9.7    4.84  )-----------( 254      ( 08 Jan 2025 10:33 )             33.2     01-08    141  |  107  |  13  ----------------------------<  86  3.4[L]   |  25  |  0.99    Ca    9.3      08 Jan 2025 10:33  Phos  3.1     01-08  Mg     2.00     01-08    TPro  6.5  /  Alb  2.7[L]  /  TBili  0.3  /  DBili  x   /  AST  13  /  ALT  14  /  AlkPhos  58  01-07          Urinalysis Basic - ( 08 Jan 2025 10:33 )    Color: x / Appearance: x / SG: x / pH: x  Gluc: 86 mg/dL / Ketone: x  / Bili: x / Urobili: x   Blood: x / Protein: x / Nitrite: x   Leuk Esterase: x / RBC: x / WBC x   Sq Epi: x / Non Sq Epi: x / Bacteria: x        SARS-CoV-2: NotDetec (27 Nov 2024 13:52)

## 2025-01-08 NOTE — DISCHARGE NOTE PROVIDER - NSDCCPCAREPLAN_GEN_ALL_CORE_FT
PRINCIPAL DISCHARGE DIAGNOSIS  Diagnosis: Pulmonary embolism  Assessment and Plan of Treatment: You came to the hospital with sob and ams. You were found to have clots in your legs and your lungs. Continue to take eliquis as prescribed.      SECONDARY DISCHARGE DIAGNOSES  Diagnosis: Metabolic encephalopathy  Assessment and Plan of Treatment: - You came to the hospital with confusion. You were found to have a UTI likely due to your chronic carter and you were treated with antibiotics. You were also found to have a subacute CVA. Continue to take all your medications as prescribed.    Diagnosis: GI bleed  Assessment and Plan of Treatment: - while you were in the hospital, you developed a gi bleed and required blood transfusions likely from the anticoagulation  - we restarted your eliquis and your blood count remained stable. Return to the hospital if you notice any further bleeding.    Diagnosis: Pleural effusion  Assessment and Plan of Treatment: - you were found to have fluid in your lungs during this admission. After discussion about potential opf malignancy, your family wishes to defer any intervention at this time.

## 2025-01-08 NOTE — PROVIDER CONTACT NOTE (OTHER) - REASON
patient BM noted to be darker
Hypertension
Pt refusing labs and IV renewal
V-tach
Lethargy and hypertension
/109, patient lethargic
Hypertension
patient being violent when trying to do morning labs and refusing meds.
hypotension
Patient lethargic, unable to complete neuro assessment
Patient had 5 beats of v tach at 10:55
Pt refusing morning coreg, labs.

## 2025-01-08 NOTE — PROVIDER CONTACT NOTE (OTHER) - RECOMMENDATIONS
ACP Ailyn Fu made aware
Notify ACP.
ACP notified
Notify ACP.
provider notified
Notify ACP.
Notify ACP.
ACP notified
continue to monitor BP and increase blood transfusion rate
ACP made aware
ACP made aware.

## 2025-01-08 NOTE — PROVIDER CONTACT NOTE (OTHER) - BACKGROUND
89 y.o. male admitted for PE
89yoM with PMHx of dementia, CAD, prior DVT and BPH with chronic indwelling carter.  Presents to ED for right facial droop worsening from baseline and slurred speech x2 days.
89yoM with PMHx of dementia, CAD, prior DVT and BPH with chronic indwelling carter.  Presents to ED for right facial droop worsening from baseline and slurred speech x2 days.
Admitted for PE. PMH dementia, HTN, BPH
Admitted for PE. PMH dementia, HTN, BPH
Pt admitted for other pulmonary embolism without acute cor pulmonale
Patient admitted for PE
89yoM with PMHx of dementia, CAD, prior DVT and BPH with chronic indwelling carter.  Presents to ED for right facial droop worsening from baseline and slurred speech x2 days.
Patient admitted for PE on heparin drip
patient is a 89yo male with PMH of CAD, BPH, HTN. Admitting diagnosis is a pulmonary embolism.
89yoM with PMHx of dementia, CAD, prior DVT and BPH with chronic indwelling carter.  Presents to ED for right facial droop worsening from baseline and slurred speech x2 days.
PMH DVT, CAD, HTN, BPH

## 2025-01-08 NOTE — PROGRESS NOTE ADULT - ASSESSMENT
EKG - NSR RBBB unchanged    2D echo show EF 45%, severe pulm HTN, mod to severe AS valve area 1.1 cm2 RV mildly dilated with decreased function    a/p     1) GIB  - melena w/ decreased Hb, s/p PRBC 2 units, s/p PPI gtt  - H/H stable, on IV ppi f/u GI recs    2) B/L PE  - IV heparin held for GIB, monitor h/h, severe pulm HTN and reduced RV function, hemodynamically stable no sign of right heart failure, now heparin gtt switched to eliquis 2.5 bid    3) CAD s/p CABG - EF mildly reduced , EKG unchanged , BNP > 12K but not in clinical CHF, held asa,plavix for GIB    4) Mod to Sev AS - valve are 1.1cm2 on 2d echo with low gradients, pt not a candidate for intervention sec to dementia conservative management   EKG - NSR RBBB unchanged    2D echo show EF 45%, severe pulm HTN, mod to severe AS valve area 1.1 cm2 RV mildly dilated with decreased function    a/p     1) GIB  - melena w/ decreased Hb, s/p PRBC 2 units, s/p PPI gtt  - H/H stable, on IV ppi f/u GI recs    2) B/L PE  - IV heparin held for GIB, monitor h/h, severe pulm HTN and reduced RV function, hemodynamically stable no sign of right heart failure, now heparin gtt switched to eliquis 2.5 bid    3) CAD s/p CABG - EF mildly reduced , EKG unchanged , BNP > 12K but not in clinical CHF, d/adriana asa,plavix for GIB    4) Mod to Sev AS - valve are 1.1cm2 on 2d echo with low gradients, pt not a candidate for intervention sec to dementia conservative management

## 2025-01-08 NOTE — PROVIDER CONTACT NOTE (OTHER) - SITUATION
15 minute into blood transfusion BP 80/52 manually
Pt refusing morning coreg, labs. Patient stating he does not want any more medical treatments and that he wants to die.
Patient's BP was 185/94, HR 76.  12PM Metoprolol dose given.
patient BM noted to be darker, not black but darker than usual. sample unable to be attained
Patient had 5 beats of v tach at 10:55, tele tech informed me at 11:33
Patient lethargic, unable to complete neuro assessment. Has breif awakening to yelling/gentle shaking but does not remain awake to complete neuro assessment.
Patient's BP was 187/91, HR 80.  Asymptomatic.  ACP notified.
patient is swinging, spitting and trying to bite staff when trying to draw labs and refusing to take medications.
Pt refusing labs
/109, patient lethargic
Patient unable to cooperate in 730AM neuro check due to patient experiencing lethargy.  /95, HR 76.  ACP notified.
Pt had 3 beats of Vtach

## 2025-01-08 NOTE — PROVIDER CONTACT NOTE (OTHER) - NAME OF MD/NP/PA/DO NOTIFIED:
Emilee Diaz
FARRAH Davis
ACP Carol Kaur
Demetri Richard
Rachel ACP
SEAN Mckenna
Ailyn Flores
Emilee Diaz
ACP Stephen Gutierrez
FARRAH Davis
FARRAH Davis
SEAN Fu

## 2025-01-09 VITALS
DIASTOLIC BLOOD PRESSURE: 96 MMHG | SYSTOLIC BLOOD PRESSURE: 138 MMHG | TEMPERATURE: 98 F | RESPIRATION RATE: 18 BRPM | OXYGEN SATURATION: 100 % | HEART RATE: 81 BPM

## 2025-01-09 PROCEDURE — 99239 HOSP IP/OBS DSCHRG MGMT >30: CPT

## 2025-01-09 RX ADMIN — PANTOPRAZOLE 40 MILLIGRAM(S): 40 TABLET, DELAYED RELEASE ORAL at 05:29

## 2025-01-09 RX ADMIN — APIXABAN 2.5 MILLIGRAM(S): 5 TABLET, FILM COATED ORAL at 05:28

## 2025-01-09 RX ADMIN — CARVEDILOL 6.25 MILLIGRAM(S): 25 TABLET, FILM COATED ORAL at 06:46

## 2025-01-09 RX ADMIN — OSELTAMIVIR 30 MILLIGRAM(S): 75 CAPSULE ORAL at 05:28

## 2025-01-09 RX ADMIN — Medication 1 APPLICATION(S): at 05:28

## 2025-01-09 RX ADMIN — Medication 17 GRAM(S): at 05:28

## 2025-01-09 NOTE — PROGRESS NOTE ADULT - PROBLEM SELECTOR PROBLEM 6
LIZZETTE (acute kidney injury)
Acute UTI
LIZZETTE (acute kidney injury)
Dementia
Acute UTI
Acute UTI
Dementia
Dementia
Acute UTI
LIZZETTE (acute kidney injury)
Acute UTI

## 2025-01-09 NOTE — PROGRESS NOTE ADULT - PROBLEM SELECTOR PROBLEM 8
LIZZETTE (acute kidney injury)
Essential hypertension
LIZZETTE (acute kidney injury)
LIZZETTE (acute kidney injury)
BPH (benign prostatic hyperplasia)
Essential hypertension
Essential hypertension
LIZZETTE (acute kidney injury)
LIZZETTE (acute kidney injury)

## 2025-01-09 NOTE — PROGRESS NOTE ADULT - PROBLEM SELECTOR PLAN 10
Continue Coreg
- Continue Coreg
Had discussion with family by bedside and over speakerphone.  - wishes to defer intervention for pleural effusion at this time given situation with pulmonary embolism and acute CVA.  - will monitor pulmonary status  - aware of potential of malignancy as possibility.
- Continue Coreg
Continue Coreg
Had discussion with family by bedside and over speakerphone.  - wishes to defer intervention for pleural effusion at this time given situation with pulmonary embolism and acute CVA.  - will monitor pulmonary status  - aware of potential of malignancy as possibility.

## 2025-01-09 NOTE — PROGRESS NOTE ADULT - PROBLEM SELECTOR PROBLEM 5
Pulmonary embolism
Aortic stenosis
Pulmonary embolism
CAD, multiple vessel
Decreased cardiac ejection fraction
Pulmonary embolism
Pulmonary embolism
Aortic stenosis
Decreased cardiac ejection fraction
Aortic stenosis
Pulmonary embolism
Pulmonary embolism

## 2025-01-09 NOTE — PROGRESS NOTE ADULT - PROBLEM/PLAN-11
DISPLAY PLAN FREE TEXT
No
DISPLAY PLAN FREE TEXT

## 2025-01-09 NOTE — PROGRESS NOTE ADULT - PROBLEM SELECTOR PROBLEM 9
BPH (benign prostatic hyperplasia)
Prophylactic measure
Dementia
Dementia
Prophylactic measure
Dementia
Dementia
BPH (benign prostatic hyperplasia)
Prophylactic measure
Dementia
BPH (benign prostatic hyperplasia)
Dementia

## 2025-01-09 NOTE — PROGRESS NOTE ADULT - PROBLEM SELECTOR PROBLEM 10
Essential hypertension
Pleural effusion
Essential hypertension
Pleural effusion
Essential hypertension

## 2025-01-09 NOTE — PROGRESS NOTE ADULT - PROBLEM SELECTOR PROBLEM 4
Acute cerebrovascular accident (CVA)
LIZZETTE (acute kidney injury)
Acute cerebrovascular accident (CVA)
Acute UTI
Acute cerebrovascular accident (CVA)
Acute UTI
Acute cerebrovascular accident (CVA)
Acute UTI
Acute cerebrovascular accident (CVA)
Acute cerebrovascular accident (CVA)

## 2025-01-09 NOTE — PROGRESS NOTE ADULT - PROBLEM SELECTOR PROBLEM 3
Pulmonary embolism
Anemia due to acute blood loss
Pulmonary embolism
Anemia due to acute blood loss
Pulmonary embolism
Anemia due to acute blood loss
Pulmonary embolism
Anemia due to acute blood loss
Anemia due to acute blood loss

## 2025-01-09 NOTE — PROGRESS NOTE ADULT - NUTRITIONAL ASSESSMENT
This patient has been assessed with a concern for Malnutrition and has been determined to have a diagnosis/diagnoses of Severe protein-calorie malnutrition.    This patient is being managed with:   Diet DASH/TLC-  Sodium & Cholesterol Restricted  Entered: Jan 4 2025 12:41PM  
This patient has been assessed with a concern for Malnutrition and has been determined to have a diagnosis/diagnoses of Severe protein-calorie malnutrition.    This patient is being managed with:   Diet Minced and Moist-  DASH/TLC {Sodium & Cholesterol Restricted} (DASH)  No Fish  Supplement Feeding Modality:  Oral  Ensure Plus High Protein Cans or Servings Per Day:  2       Frequency:  Two Times a day  Entered: Dec 28 2024  4:50PM  
This patient has been assessed with a concern for Malnutrition and has been determined to have a diagnosis/diagnoses of Severe protein-calorie malnutrition.    This patient is being managed with:   Diet Clear Liquid-  Entered: Marcos  3 2025  9:14AM  
This patient has been assessed with a concern for Malnutrition and has been determined to have a diagnosis/diagnoses of Severe protein-calorie malnutrition.    This patient is being managed with:   Diet DASH/TLC-  Sodium & Cholesterol Restricted  Entered: Jan 4 2025 12:41PM  
This patient has been assessed with a concern for Malnutrition and has been determined to have a diagnosis/diagnoses of Severe protein-calorie malnutrition.    This patient is being managed with:   Diet NPO-  With Ice Chips/Sips of Water  Entered: Jan 1 2025 11:36AM  
This patient has been assessed with a concern for Malnutrition and has been determined to have a diagnosis/diagnoses of Severe protein-calorie malnutrition.    This patient is being managed with:   Diet Minced and Moist-  DASH/TLC {Sodium & Cholesterol Restricted} (DASH)  No Fish  Supplement Feeding Modality:  Oral  Ensure Plus High Protein Cans or Servings Per Day:  2       Frequency:  Two Times a day  Entered: Dec 28 2024  4:50PM  
This patient has been assessed with a concern for Malnutrition and has been determined to have a diagnosis/diagnoses of Severe protein-calorie malnutrition.    This patient is being managed with:   Diet Clear Liquid-  Entered: Marcos  3 2025  9:14AM  
This patient has been assessed with a concern for Malnutrition and has been determined to have a diagnosis/diagnoses of Severe protein-calorie malnutrition.    This patient is being managed with:   Diet NPO-  With Ice Chips/Sips of Water  Entered: Jan 1 2025 11:36AM  
This patient has been assessed with a concern for Malnutrition and has been determined to have a diagnosis/diagnoses of Severe protein-calorie malnutrition.    This patient is being managed with:   Diet DASH/TLC-  Sodium & Cholesterol Restricted  Entered: Jan 4 2025 12:41PM  
This patient has been assessed with a concern for Malnutrition and has been determined to have a diagnosis/diagnoses of Severe protein-calorie malnutrition.    This patient is being managed with:   Diet DASH/TLC-  Sodium & Cholesterol Restricted  Entered: Jan 4 2025 12:41PM  
This patient has been assessed with a concern for Malnutrition and has been determined to have a diagnosis/diagnoses of Severe protein-calorie malnutrition.    This patient is being managed with:   Diet Regular-  DASH/TLC {Sodium & Cholesterol Restricted} (DASH)  Soft and Bite Sized ([id number])  No Fish  Supplement Feeding Modality:  Oral  Ensure Plus High Protein Cans or Servings Per Day:  2       Frequency:  Two Times a day  Entered: Dec 30 2024  3:08PM  
This patient has been assessed with a concern for Malnutrition and has been determined to have a diagnosis/diagnoses of Severe protein-calorie malnutrition.    This patient is being managed with:   Diet DASH/TLC-  Sodium & Cholesterol Restricted  Entered: Jan 4 2025 12:41PM

## 2025-01-09 NOTE — PROGRESS NOTE ADULT - PROBLEM SELECTOR PROBLEM 11
BPH (benign prostatic hyperplasia)
Exposure to the flu
Exposure to the flu
BPH (benign prostatic hyperplasia)

## 2025-01-09 NOTE — PROGRESS NOTE ADULT - PROBLEM SELECTOR PLAN 7
- continue coreg 6.25mg BID.
Noted AS  - Cardiology consult - not candidate for TAVR
- continue coreg 6.25mg BID.
Continue Trazodone for mood control
Continue Trazodone for mood control
Noted AS  - Cardiology consult - not candidate for TAVR
Continue Trazodone for mood control
Noted AS  - Cardiology consult - not candidate for TAVR
Noted AS  - Cardiology consult - not candidate for TAVR
- continue coreg 6.25mg BID
Noted AS  - Cardiology consult - not candidate for TAVR

## 2025-01-09 NOTE — PROGRESS NOTE ADULT - PROBLEM SELECTOR PLAN 4
- cr of 1.5 baseline 1.11  - etiology likely pre-renal vs ATN  - s/p IVF 500cc, now resolved. 1.19 this am.
subacute CVA  - EEG performed - no evidence of SZ D/O  - MR Brain reviewed   TTE with bubble reviewed  - Dysphagia screening done  - NIHSS   - Tele monitor  - Stroke education provided by nursing  - ASA held d/t GI bleed concerns  - Lipid panel reviewed  - Statin  - PT/OT/PMR eval for safe disposition - wishes for home but if unable, agreeable for ARETHA  - Hep gtt now off due to GIB
- cr of 1.5 baseline 1.11  - etiology likely pre-renal vs ATN  - s/p IVF 500cc.   - now resolved. 1.12 this am.
subacute CVA  - EEG performed - no evidence of SZ D/O  - MR Brain reviewed   TTE with bubble reviewed  - Dysphagia screening done  - NIHSS   - Tele monitor  - Stroke education provided by nursing  - ASA held d/t GI bleed concerns  - Lipid panel reviewed  - Statin  - PT/OT/PMR eval for safe disposition - wishes for home but if unable, agreeable for ARETHA  - no A/C for Afib d/t to GI hemorrhage
subacute CVA  - EEG performed - no evidence of SZ D/O  - MR Brain reviewed   TTE with bubble reviewed  - Dysphagia screening done  - NIHSS   - Tele monitor  - Stroke education provided by nursing  - ASA held d/t GI bleed concerns  - Lipid panel reviewed  - Statin  - PT/OT/PMR eval for safe disposition - wishes for home but if unable, agreeable for ARETHA  - no A/C for Afib d/t to GI hemorrhage
Complicated UTI d/t chronic carter  - continue Zosyn for 5-7 days  - F/U UCx/S
subacute CVA  - EEG performed - no evidence of SZ D/O  - MR Brain reviewed   TTE with bubble reviewed  - Dysphagia screening done  - NIHSS   - Tele monitor  - Stroke education provided by nursing  - ASA held d/t GI bleed concerns  - Lipid panel reviewed  - Statin  - PT/OT/PMR eval for safe disposition - ARETHA recommended, family wants home  - no A/C for Afib d/t to GI hemorrhage
- cr of 1.5 baseline 1.11  - etiology likely pre-renal vs ATN  - s/p IVF 500cc.   - now resolved. 1.09 this am.
Complicated UTI d/t chronic carter  - continue Zosyn for 5-7 days  - F/U UCx/S  ucx with more than 3 organisms, suspect pt is colonized, can consider dc abx; pt non toxic
subacute CVA  - EEG performed - no evidence of SZ D/O  - MR Brain reviewed   TTE with bubble reviewed  - Dysphagia screening done  - NIHSS   - Tele monitor  - Stroke education provided by nursing  - ASA held d/t GI bleed concerns  - Lipid panel reviewed  - Statin  - PT/OT/PMR eval for safe disposition - ARETHA recommended, family wants home  - no A/C for Afib d/t to GI hemorrhage
subacute CVA  - EEG performed - no evidence of SZ D/O  - MR Brain reviewed   TTE with bubble reviewed  - Dysphagia screening done  - NIHSS   - Tele monitor  - Stroke education provided by nursing  - ASA held d/t GI bleed concerns  - Lipid panel reviewed  - Statin  - PT/OT/PMR eval for safe disposition - wishes for home but if unable, agreeable for ARETHA  - no A/C for Afib d/t to GI hemorrhage
subacute CVA  - EEG performed - no evidence of SZ D/O  - MR Brain reviewed   TTE with bubble reviewed  - Dysphagia screening done  - NIHSS   - Tele monitor  - Stroke education provided by nursing  - ASA held d/t GI bleed concerns  - Lipid panel reviewed  - Statin  - PT/OT/PMR eval for safe disposition - wishes for home but if unable, agreeable for ARETHA  - no A/C for Afib d/t to GI hemorrhage
Complicated UTI d/t chronic carter  - continue Zosyn for 5-7 days  - F/U UCx/S
subacute CVA  - EEG performed - no evidence of SZ D/O  - MR Brain reviewed   TTE with bubble reviewed  - Dysphagia screening done  - NIHSS   - Tele monitor  - Stroke education provided by nursing  - ASA held d/t GI bleed concerns  - Lipid panel reviewed  - Statin  - PT/OT/PMR eval for safe disposition - wishes for home but if unable, agreeable for ARETHA  - no A/C for Afib d/t to GI hemorrhage

## 2025-01-09 NOTE — PROGRESS NOTE ADULT - PROBLEM SELECTOR PROBLEM 2
Acute cerebrovascular accident (CVA)
GI bleed
Acute cerebrovascular accident (CVA)
Acute UTI
Acute UTI
GI bleed
Acute UTI
Acute cerebrovascular accident (CVA)
GI bleed

## 2025-01-09 NOTE — PROGRESS NOTE ADULT - PROBLEM SELECTOR PROBLEM 7
Aortic stenosis
Essential hypertension
Aortic stenosis
Aortic stenosis
Essential hypertension
Dementia
Aortic stenosis
Essential hypertension
Dementia
Dementia
Aortic stenosis

## 2025-01-09 NOTE — PROGRESS NOTE ADULT - PROBLEM SELECTOR PLAN 1
multifactorial on setting of baseline dementia  - Acute CVA vs PE w/ hypoxemia vs UTI  - continue evaluation  - avoid benzo/opioids  - recommend re-orientation for behavior disturbance  seems at baseline MS, per family at bedside
# Increased lethargy, worsening R facial droop x 2d from baseline. Etiology: recrudescence vs. seizures vs toxic metabolic dt UTI vs. CVA   - Neurology following. Recs noted. CTA head/neck finding of incidental PE, TTE w/ EF 45%, grade 1 diastolic dysfunction, elevated pulm artery pressures to 89mm Hg. MR head pending.   - Rule out toxic metabolic etiology: on Zosyn empirically for UTI until cultures results and CTA results (possible infection, CXR w/ R infiltrate) (currently on oxygen 4L NC)
multifactorial on setting of baseline dementia  - Acute CVA vs PE w/ hypoxemia vs UTI  - continue evaluation  - avoid benzo/opioids  - recommend re-orientation for behavior disturbance.
# Increased lethargy, worsening R facial droop x 2d from baseline. Etiology: recrudescence vs. seizures vs toxic metabolic dt UTI vs. CVA   - Neurology following. Recs noted. CTA head/neck finding of incidental PE, CT chest pending. EEG pending. MR brain pending (ACP called to request images to be expedited). TTE w/ EF 45%, grade 1 diastolic dysfunction, elevated pulm artery pressures to 89mm Hg. MR head pending.   - Rule out toxic metabolic etiology: on Zosyn for UTI (culture growing Klebsiella and Staph) and possible PNA; CTA pending (CXR w/ R infiltrate) (currently on oxygen 4L NC)
multifactorial on setting of baseline dementia  - Acute CVA vs PE w/ hypoxemia vs UTI  - continue evaluation  - avoid benzo/opioids  - recommend re-orientation for behavior disturbance  seems at baseline MS, per family at bedside
multifactorial on setting of baseline dementia  - Acute CVA vs PE w/ hypoxemia vs UTI  - continue evaluation  - avoid benzo/opioids  - recommend re-orientation for behavior disturbance  seems at baseline MS, per family at bedside
multifactorial on setting of baseline dementia  - Acute CVA vs SZ D/O PE w/ hypoxemia vs UTI  - continue evaluation  - avoid benzo/opioids  - recommend re-orientation for behavior disturbance.
multifactorial on setting of baseline dementia  - Acute CVA vs PE w/ hypoxemia vs UTI  - continue evaluation  - avoid benzo/opioids  - recommend re-orientation for behavior disturbance
multifactorial on setting of baseline dementia  - Acute CVA vs PE w/ hypoxemia vs UTI  - continue evaluation  - avoid benzo/opioids  - recommend re-orientation for behavior disturbance  seems at baseline MS, per family at bedside
# Increased lethargy, worsening R facial droop x 2d from baseline. Etiology: recrudescence vs. seizures vs toxic metabolic dt UTI vs. CVA   - Neurology following. Recs noted. CTA head/neck finding of incidental PE, CT chest pending. EEG pending. MR brain pending. TTE w/ EF 45%, grade 1 diastolic dysfunction, elevated pulm artery pressures to 89mm Hg. MR head pending.   - Rule out toxic metabolic etiology: on Zosyn empirically for UTI until cultures results and CTA results (possible infection, CXR w/ R infiltrate) (currently on oxygen 4L NC)
multifactorial on setting of baseline dementia  - Acute CVA vs PE w/ hypoxemia vs UTI  - continue evaluation  - avoid benzo/opioids  - recommend re-orientation for behavior disturbance  seems at baseline MS, per family at bedside
multifactorial on setting of baseline dementia  - Acute CVA vs PE w/ hypoxemia vs UTI  - continue evaluation  - avoid benzo/opioids  - recommend re-orientation for behavior disturbance  - 1/7: pt more fatigued than usual today with +sore throat and requiring oxygen overnight - flu/rsv/covid negative, continue to monitor  seems at baseline MS otherwise per family at bedside
multifactorial on setting of baseline dementia  - Acute CVA vs PE w/ hypoxemia vs UTI  - continue evaluation  - avoid benzo/opioids  - recommend re-orientation for behavior disturbance  - 1/7: pt more fatigued than usual today with +sore throat and requiring oxygen overnight - flu/rsv/covid negative, continue to monitor - patient feeling better today and off oxygen.  seems at baseline MS otherwise per family at bedside
multifactorial on setting of baseline dementia  - Acute CVA vs PE w/ hypoxemia vs UTI  - continue evaluation  - avoid benzo/opioids  - recommend re-orientation for behavior disturbance  seems at baseline MS, per family at bedside

## 2025-01-09 NOTE — PROGRESS NOTE ADULT - PROBLEM SELECTOR PLAN 5
Noted AS  - Cardiology consult - not candidate for TAVR
Confirmed PE on CTPA  - a/c d/c'd d/t GI hemorrhage  - titrate down on O2 as needed, no resp distress  - obtain LE doppler  - IR consult for IVC filter.
- TTE 12/27/24: EF 45%, grade 1 diastolic dysfunction, elevated pulm artery pressures 89mmHg, severe aortic stenosis.   - Is/Os strict. Daily weights.   - Continuing home Carvedilol 6.25mg BID.   - Cardiology consulted. Recs as noted below:   # CAD s/p CABG: Continue home Aspirin 81mg QD and Carvedilol 6.25mg BID.  # PE: IV heparin, severe pulm HTN and reduced RV function: hemodynamically stable. Not in HF exacerbation.   # Mod/Severe AS: pt not candidate for intervention
Confirmed PE on CTPA  - a/c d/c'd d/t GI hemorrhage  - titrate down on O2 as needed, no resp distress  - doppler + for b/l DVTs  - transitioned to doac on 1/6
- TTE 12/27/24: EF 45%, grade 1 diastolic dysfunction, elevated pulm artery pressures 89mmHg, severe aortic stenosis.   - Is/Os strict. Daily weights.   - Continuing home Carvedilol 6.25mg BID.   - Cardiology consulted, Dr. Pricne.     # CAD: Continue home Aspirin 81mg QD and Carvedilol 6.25mg BID.
Confirmed PE on CTPA  - a/c d/c'd d/t GI hemorrhage  - titrate down on O2 as needed, no resp distress  - doppler + for b/l DVTs  - transitioned to doac on 1/6
Confirmed PE on CTPA  - a/c d/c'd d/t GI hemorrhage  - titrate down on O2 as needed, no resp distress  - obtain LE doppler  - IR consult for IVC filter.
Confirmed PE on CTPA  - hep gtt on hold d/t GI bleed concern  - titrate down on O2 as needed, no resp distress  may need to consider IVC filter if unable to tolerate a/c.
-continue aspirin.
Confirmed PE on CTPA  - a/c d/c'd d/t GI hemorrhage  - titrate down on O2 as needed, no resp distress  - doppler + for b/l DVTs  - transitioned to doac on 1/6  - IR consult for IVC filter if unable to tolerate
Confirmed PE on CTPA  - a/c d/c'd d/t GI hemorrhage  - titrate down on O2 as needed, no resp distress  doppler + for b/l DVTs  re-challanging with heparin drip with tight PTT 40-50, if no evidence of bleeding, will transition to DOAC  - IR consult for IVC filter if unable to tolerate
Confirmed PE on CTPA  - a/c d/c'd d/t GI hemorrhage  - titrate down on O2 as needed, no resp distress  - doppler + for b/l DVTs  - re-challanging with heparin drip with tight PTT 40-50, if no evidence of bleeding, will transition to DOAC  - IR consult for IVC filter if unable to tolerate

## 2025-01-09 NOTE — PROGRESS NOTE ADULT - PROVIDER SPECIALTY LIST ADULT
Hospitalist
Hospitalist
Cardiology
Hospitalist
Cardiology
Hospitalist

## 2025-01-09 NOTE — PROGRESS NOTE ADULT - PROBLEM SELECTOR PROBLEM 12
Pleural effusion
Pleural effusion
GI bleed
Pleural effusion
GI bleed

## 2025-01-09 NOTE — PROGRESS NOTE ADULT - REASON FOR ADMISSION
weakness
weaknessc
weakness

## 2025-01-09 NOTE — PROGRESS NOTE ADULT - PROBLEM SELECTOR PLAN 12
- family wishes to defer intervention for pleural effusion at this time given situation with pulmonary embolism and acute CVA.  - will monitor pulmonary status  - aware of potential of malignancy as possibility.    # Dispo: pending home pending a set up tomorrow morning
Had discussion with family by bedside and over speakerphone on 12/31.  - wishes to defer intervention for pleural effusion at this time given situation with pulmonary embolism and acute CVA.  - will monitor pulmonary status  - aware of potential of malignancy as possibility.
- family wishes to defer intervention for pleural effusion at this time given situation with pulmonary embolism and acute CVA.  - will monitor pulmonary status  - aware of potential of malignancy as possibility.    # Dispo: pending hg stability
Had discussion with family by bedside and over speakerphone on 12/31.  - wishes to defer intervention for pleural effusion at this time given situation with pulmonary embolism and acute CVA.  - will monitor pulmonary status  - aware of potential of malignancy as possibility.
- family wishes to defer intervention for pleural effusion at this time given situation with pulmonary embolism and acute CVA.  - will monitor pulmonary status  - aware of potential of malignancy as possibility.    # Dispo: pending home pending a set up tomorrow morning
concern for GI bleed given melena.  - likely d/t anticoagulation.  - d/c ASA, hep gtt  - Protonix IV BID, hoang  hg dropped after episodes of melena  GI consulted  mild hypotension, improved with PRBC, second unit to be given  monitor closely if persistent hypotension/bleeding may need MICU eval
Had discussion with family by bedside and over speakerphone on 12/31.  - wishes to defer intervention for pleural effusion at this time given situation with pulmonary embolism and acute CVA.  - will monitor pulmonary status  - aware of potential of malignancy as possibility.
Had discussion with family by bedside and over speakerphone on 12/31.  - wishes to defer intervention for pleural effusion at this time given situation with pulmonary embolism and acute CVA.  - will monitor pulmonary status  - aware of potential of malignancy as possibility.
- family wishes to defer intervention for pleural effusion at this time given situation with pulmonary embolism and acute CVA.  - will monitor pulmonary status  - aware of potential of malignancy as possibility.    # Dispo: pending hg stability
concern for GI bleed given melena.  - likely d/t anticoagulation.  - d/c ASA, hep gtt  - Protonix IV BID  - monitor Hgb  - if Hgb drops, GI consult.

## 2025-01-09 NOTE — PROGRESS NOTE ADULT - PROBLEM SELECTOR PLAN 11
- Continue Proscar and Flomax
Continue Proscar and Flomax
- Continue Proscar and Flomax
Continue Proscar and Flomax
tamiflu ppx
- Continue Proscar and Flomax
Continue Proscar and Flomax
Continue Proscar and Flomax
- Continue Proscar and Flomax
tamiflu ppx

## 2025-01-10 ENCOUNTER — APPOINTMENT (OUTPATIENT)
Dept: HOME HEALTH SERVICES | Facility: HOME HEALTH | Age: 89
End: 2025-01-10

## 2025-01-10 VITALS
HEIGHT: 65 IN | OXYGEN SATURATION: 95 % | WEIGHT: 130 LBS | HEART RATE: 70 BPM | DIASTOLIC BLOOD PRESSURE: 56 MMHG | BODY MASS INDEX: 21.66 KG/M2 | RESPIRATION RATE: 16 BRPM | SYSTOLIC BLOOD PRESSURE: 107 MMHG

## 2025-01-10 DIAGNOSIS — I82.409 ACUTE EMBOLISM AND THROMBOSIS OF UNSPECIFIED DEEP VEINS OF UNSPECIFIED LOWER EXTREMITY: ICD-10-CM

## 2025-01-10 DIAGNOSIS — K92.2 GASTROINTESTINAL HEMORRHAGE, UNSPECIFIED: ICD-10-CM

## 2025-01-10 DIAGNOSIS — I26.99 OTHER PULMONARY EMBOLISM W/OUT ACUTE COR PULMONALE: ICD-10-CM

## 2025-01-10 PROCEDURE — 99495 TRANSJ CARE MGMT MOD F2F 14D: CPT

## 2025-01-10 RX ORDER — ATORVASTATIN CALCIUM 10 MG/1
10 TABLET, FILM COATED ORAL
Qty: 90 | Refills: 3 | Status: ACTIVE | COMMUNITY
Start: 2025-01-10

## 2025-01-10 RX ORDER — APIXABAN 2.5 MG/1
2.5 TABLET, FILM COATED ORAL
Qty: 180 | Refills: 1 | Status: ACTIVE | COMMUNITY
Start: 2025-01-10 | End: 1900-01-01

## 2025-01-10 RX ORDER — PANTOPRAZOLE 40 MG/1
40 TABLET, DELAYED RELEASE ORAL TWICE DAILY
Qty: 180 | Refills: 3 | Status: ACTIVE | COMMUNITY
Start: 2025-01-10 | End: 1900-01-01

## 2025-02-21 PROBLEM — I63.9 CVA (CEREBRAL VASCULAR ACCIDENT): Status: ACTIVE | Noted: 2025-02-21

## 2025-02-24 ENCOUNTER — APPOINTMENT (OUTPATIENT)
Dept: HOME HEALTH SERVICES | Facility: HOME HEALTH | Age: 89
End: 2025-02-24

## 2025-02-24 DIAGNOSIS — I26.99 OTHER PULMONARY EMBOLISM W/OUT ACUTE COR PULMONALE: ICD-10-CM

## 2025-02-24 DIAGNOSIS — F03.90 UNSPECIFIED DEMENTIA W/OUT BEHAVIORAL DISTURBANCE: ICD-10-CM

## 2025-02-24 DIAGNOSIS — I35.0 NONRHEUMATIC AORTIC (VALVE) STENOSIS: ICD-10-CM

## 2025-02-24 DIAGNOSIS — Z93.59 OTHER CYSTOSTOMY STATUS: ICD-10-CM

## 2025-02-24 PROCEDURE — 99349 HOME/RES VST EST MOD MDM 40: CPT

## 2025-04-01 ENCOUNTER — NON-APPOINTMENT (OUTPATIENT)
Age: 89
End: 2025-04-01

## 2025-04-01 ENCOUNTER — APPOINTMENT (OUTPATIENT)
Dept: UROLOGY | Facility: CLINIC | Age: 89
End: 2025-04-01
Payer: MEDICARE

## 2025-04-01 VITALS
HEIGHT: 65 IN | TEMPERATURE: 98 F | SYSTOLIC BLOOD PRESSURE: 86 MMHG | OXYGEN SATURATION: 96 % | DIASTOLIC BLOOD PRESSURE: 63 MMHG | RESPIRATION RATE: 16 BRPM | BODY MASS INDEX: 21.66 KG/M2 | WEIGHT: 130 LBS | HEART RATE: 70 BPM

## 2025-04-01 DIAGNOSIS — N40.0 BENIGN PROSTATIC HYPERPLASIA WITHOUT LOWER URINARY TRACT SYMPMS: ICD-10-CM

## 2025-04-01 PROCEDURE — 51705 CHANGE OF BLADDER TUBE: CPT

## 2025-06-13 ENCOUNTER — APPOINTMENT (OUTPATIENT)
Dept: UROLOGY | Facility: CLINIC | Age: 89
End: 2025-06-13

## 2025-06-13 VITALS
HEART RATE: 72 BPM | TEMPERATURE: 98.6 F | SYSTOLIC BLOOD PRESSURE: 110 MMHG | DIASTOLIC BLOOD PRESSURE: 65 MMHG | OXYGEN SATURATION: 95 % | BODY MASS INDEX: 21.63 KG/M2 | WEIGHT: 130 LBS

## 2025-06-13 PROCEDURE — 51705 CHANGE OF BLADDER TUBE: CPT

## 2025-06-18 ENCOUNTER — RX RENEWAL (OUTPATIENT)
Age: 89
End: 2025-06-18

## 2025-06-27 ENCOUNTER — APPOINTMENT (OUTPATIENT)
Dept: HOME HEALTH SERVICES | Facility: HOME HEALTH | Age: 89
End: 2025-06-27
Payer: MEDICARE

## 2025-06-27 VITALS
WEIGHT: 130 LBS | DIASTOLIC BLOOD PRESSURE: 80 MMHG | SYSTOLIC BLOOD PRESSURE: 140 MMHG | OXYGEN SATURATION: 95 % | TEMPERATURE: 98.3 F | BODY MASS INDEX: 21.63 KG/M2 | HEART RATE: 66 BPM | RESPIRATION RATE: 15 BRPM

## 2025-06-27 PROBLEM — Z86.59 HISTORY OF DEPRESSION: Status: RESOLVED | Noted: 2024-06-21 | Resolved: 2025-06-27

## 2025-06-27 PROBLEM — Z87.01 HISTORY OF PNEUMONIA: Status: RESOLVED | Noted: 2024-12-06 | Resolved: 2025-06-27

## 2025-06-27 PROBLEM — Z87.898 HISTORY OF URINARY INCONTINENCE: Status: RESOLVED | Noted: 2024-06-13 | Resolved: 2025-06-27

## 2025-06-27 PROBLEM — E78.5 HYPERLIPIDEMIA: Status: ACTIVE | Noted: 2025-06-27

## 2025-06-27 PROBLEM — Q84.5: Status: ACTIVE | Noted: 2025-06-27

## 2025-06-27 PROBLEM — R05.9 COUGH: Status: ACTIVE | Noted: 2025-06-27

## 2025-06-27 PROBLEM — K59.09 CONSTIPATION, CHRONIC: Status: ACTIVE | Noted: 2025-06-27

## 2025-06-27 PROBLEM — K92.2 ACUTE UPPER GI BLEEDING: Status: RESOLVED | Noted: 2025-01-10 | Resolved: 2025-06-27

## 2025-06-27 PROBLEM — K64.9 HEMORRHOID: Status: ACTIVE | Noted: 2025-06-27

## 2025-06-27 PROBLEM — Z87.898 HISTORY OF INSOMNIA: Status: RESOLVED | Noted: 2024-06-21 | Resolved: 2025-06-27

## 2025-06-27 PROBLEM — B35.1 TINEA UNGUIUM: Status: ACTIVE | Noted: 2025-06-27

## 2025-06-27 PROCEDURE — 99349 HOME/RES VST EST MOD MDM 40: CPT

## 2025-06-27 RX ORDER — GUAIFENESIN/DEXTROMETHORPHAN 100-10MG/5
10-100 LIQUID (ML) ORAL EVERY 4 HOURS
Qty: 1 | Refills: 1 | Status: ACTIVE | COMMUNITY
Start: 2025-06-27 | End: 1900-01-01

## 2025-06-27 RX ORDER — ACETAMINOPHEN 500 MG/1
500 TABLET ORAL 3 TIMES DAILY
Refills: 0 | Status: ACTIVE | COMMUNITY
Start: 2025-06-27

## 2025-06-27 RX ORDER — HYDROCORTISONE 2.5% 25 MG/G
2.5 CREAM TOPICAL TWICE DAILY
Qty: 1 | Refills: 3 | Status: ACTIVE | COMMUNITY
Start: 2025-06-27 | End: 1900-01-01

## 2025-06-27 RX ORDER — SENNOSIDES 8.6 MG/1
8.6 TABLET ORAL
Qty: 180 | Refills: 3 | Status: ACTIVE | COMMUNITY
Start: 2025-06-27 | End: 1900-01-01

## 2025-07-01 ENCOUNTER — LABORATORY RESULT (OUTPATIENT)
Age: 89
End: 2025-07-01

## 2025-07-02 PROBLEM — D50.9 IRON DEFICIENCY ANEMIA: Status: ACTIVE | Noted: 2025-07-02

## 2025-07-02 RX ORDER — CHLORHEXIDINE GLUCONATE 4 %
325 (65 FE) LIQUID (ML) TOPICAL
Qty: 36 | Refills: 1 | Status: ACTIVE | COMMUNITY
Start: 2025-07-02 | End: 1900-01-01

## 2025-07-03 ENCOUNTER — LABORATORY RESULT (OUTPATIENT)
Age: 89
End: 2025-07-03

## 2025-07-09 ENCOUNTER — NON-APPOINTMENT (OUTPATIENT)
Age: 89
End: 2025-07-09

## 2025-07-11 ENCOUNTER — APPOINTMENT (OUTPATIENT)
Dept: UROLOGY | Facility: CLINIC | Age: 89
End: 2025-07-11
Payer: MEDICARE

## 2025-07-11 VITALS
HEART RATE: 70 BPM | SYSTOLIC BLOOD PRESSURE: 150 MMHG | WEIGHT: 130 LBS | DIASTOLIC BLOOD PRESSURE: 80 MMHG | TEMPERATURE: 97.7 F | BODY MASS INDEX: 21.66 KG/M2 | HEIGHT: 65 IN | OXYGEN SATURATION: 94 %

## 2025-07-11 PROCEDURE — 99214 OFFICE O/P EST MOD 30 MIN: CPT | Mod: 25

## 2025-07-11 PROCEDURE — 51710 CHANGE OF BLADDER TUBE: CPT

## 2025-08-04 ENCOUNTER — APPOINTMENT (OUTPATIENT)
Dept: HOME HEALTH SERVICES | Facility: HOME HEALTH | Age: 89
End: 2025-08-04

## 2025-08-04 VITALS
SYSTOLIC BLOOD PRESSURE: 145 MMHG | HEART RATE: 71 BPM | OXYGEN SATURATION: 96 % | TEMPERATURE: 97.4 F | DIASTOLIC BLOOD PRESSURE: 80 MMHG | RESPIRATION RATE: 15 BRPM

## 2025-09-18 ENCOUNTER — APPOINTMENT (OUTPATIENT)
Dept: HOME HEALTH SERVICES | Facility: HOME HEALTH | Age: 89
End: 2025-09-18
Payer: MEDICARE

## 2025-09-18 VITALS
SYSTOLIC BLOOD PRESSURE: 122 MMHG | DIASTOLIC BLOOD PRESSURE: 78 MMHG | RESPIRATION RATE: 14 BRPM | BODY MASS INDEX: 20.8 KG/M2 | HEART RATE: 78 BPM | WEIGHT: 125 LBS | OXYGEN SATURATION: 98 %

## 2025-09-18 DIAGNOSIS — F03.90 UNSPECIFIED DEMENTIA W/OUT BEHAVIORAL DISTURBANCE: ICD-10-CM

## 2025-09-18 DIAGNOSIS — D64.9 ANEMIA, UNSPECIFIED: ICD-10-CM

## 2025-09-18 DIAGNOSIS — Z87.898 PERSONAL HISTORY OF OTHER SPECIFIED CONDITIONS: ICD-10-CM

## 2025-09-18 DIAGNOSIS — I73.9 PERIPHERAL VASCULAR DISEASE, UNSPECIFIED: ICD-10-CM

## 2025-09-18 DIAGNOSIS — Z93.59 OTHER CYSTOSTOMY STATUS: ICD-10-CM

## 2025-09-18 DIAGNOSIS — N18.31 CHRONIC KIDNEY DISEASE, STAGE 3A: ICD-10-CM

## 2025-09-18 DIAGNOSIS — N40.0 BENIGN PROSTATIC HYPERPLASIA WITHOUT LOWER URINARY TRACT SYMPMS: ICD-10-CM

## 2025-09-18 DIAGNOSIS — I26.99 OTHER PULMONARY EMBOLISM W/OUT ACUTE COR PULMONALE: ICD-10-CM

## 2025-09-18 DIAGNOSIS — I82.409 ACUTE EMBOLISM AND THROMBOSIS OF UNSPECIFIED DEEP VEINS OF UNSPECIFIED LOWER EXTREMITY: ICD-10-CM

## 2025-09-18 DIAGNOSIS — K92.2 GASTROINTESTINAL HEMORRHAGE, UNSPECIFIED: ICD-10-CM

## 2025-09-18 DIAGNOSIS — E78.5 HYPERLIPIDEMIA, UNSPECIFIED: ICD-10-CM

## 2025-09-18 DIAGNOSIS — I63.9 CEREBRAL INFARCTION, UNSPECIFIED: ICD-10-CM

## 2025-09-18 DIAGNOSIS — I25.10 ATHEROSCLEROTIC HEART DISEASE OF NATIVE CORONARY ARTERY W/OUT ANGINA PECTORIS: ICD-10-CM

## 2025-09-18 PROCEDURE — 99349 HOME/RES VST EST MOD MDM 40: CPT

## 2025-09-26 LAB
ALBUMIN SERPL ELPH-MCNC: 3 G/DL
ALP BLD-CCNC: 72 U/L
ALT SERPL-CCNC: 20 U/L
ANION GAP SERPL CALC-SCNC: 14 MMOL/L
AST SERPL-CCNC: 19 U/L
BASOPHILS # BLD AUTO: 0.02 K/UL
BASOPHILS NFR BLD AUTO: 0.5 %
BILIRUB SERPL-MCNC: 0.2 MG/DL
BUN SERPL-MCNC: 19 MG/DL
CALCIUM SERPL-MCNC: 9.1 MG/DL
CHLORIDE SERPL-SCNC: 105 MMOL/L
CO2 SERPL-SCNC: 26 MMOL/L
CREAT SERPL-MCNC: 1 MG/DL
EGFRCR SERPLBLD CKD-EPI 2021: 72 ML/MIN/1.73M2
EOSINOPHIL # BLD AUTO: 0.1 K/UL
EOSINOPHIL NFR BLD AUTO: 2.3 %
GLUCOSE SERPL-MCNC: 119 MG/DL
HCT VFR BLD CALC: 36.9 %
HGB BLD-MCNC: 10.6 G/DL
IMM GRANULOCYTES NFR BLD AUTO: 0 %
LYMPHOCYTES # BLD AUTO: 1.12 K/UL
LYMPHOCYTES NFR BLD AUTO: 26 %
MAN DIFF?: NORMAL
MCHC RBC-ENTMCNC: 25.9 PG
MCHC RBC-ENTMCNC: 28.7 G/DL
MCV RBC AUTO: 90.2 FL
MONOCYTES # BLD AUTO: 0.45 K/UL
MONOCYTES NFR BLD AUTO: 10.5 %
NEUTROPHILS # BLD AUTO: 2.61 K/UL
NEUTROPHILS NFR BLD AUTO: 60.7 %
PLATELET # BLD AUTO: 239 K/UL
POTASSIUM SERPL-SCNC: 3.8 MMOL/L
PROT SERPL-MCNC: 6.6 G/DL
RBC # BLD: 4.09 M/UL
RBC # FLD: 16 %
SODIUM SERPL-SCNC: 144 MMOL/L
WBC # FLD AUTO: 4.3 K/UL